# Patient Record
Sex: FEMALE | Race: BLACK OR AFRICAN AMERICAN | Employment: FULL TIME | URBAN - METROPOLITAN AREA
[De-identification: names, ages, dates, MRNs, and addresses within clinical notes are randomized per-mention and may not be internally consistent; named-entity substitution may affect disease eponyms.]

---

## 2017-01-25 DIAGNOSIS — B37.9 YEAST INFECTION: Primary | ICD-10-CM

## 2017-01-25 RX ORDER — FLUCONAZOLE 150 MG/1
150 TABLET ORAL ONCE
Qty: 1 TABLET | Refills: 1 | Status: SHIPPED | OUTPATIENT
Start: 2017-01-25 | End: 2017-01-25

## 2017-02-24 DIAGNOSIS — B37.31 VAGINAL YEAST INFECTION: Primary | ICD-10-CM

## 2017-02-24 RX ORDER — FLUCONAZOLE 150 MG/1
150 TABLET ORAL ONCE
Qty: 1 TABLET | Refills: 1 | Status: SHIPPED | OUTPATIENT
Start: 2017-02-24 | End: 2017-02-24

## 2017-03-08 ENCOUNTER — OFFICE VISIT (OUTPATIENT)
Dept: GYNECOLOGY | Age: 27
End: 2017-03-08

## 2017-03-08 VITALS
OXYGEN SATURATION: 97 % | DIASTOLIC BLOOD PRESSURE: 72 MMHG | HEART RATE: 87 BPM | SYSTOLIC BLOOD PRESSURE: 115 MMHG | BODY MASS INDEX: 51.69 KG/M2 | WEIGHT: 293 LBS

## 2017-03-08 DIAGNOSIS — Z11.3 SCREENING FOR STD (SEXUALLY TRANSMITTED DISEASE): ICD-10-CM

## 2017-03-08 DIAGNOSIS — N89.8 VAGINAL ITCHING: Primary | ICD-10-CM

## 2017-03-08 LAB
BACTERIA WET PREP: ABNORMAL
CLUE CELLS: ABNORMAL
EPITHELIAL CELLS WET PREP: ABNORMAL
HEPATITIS B SURFACE ANTIGEN INTERPRETATION: NORMAL
HEPATITIS C ANTIBODY INTERPRETATION: NORMAL
RBC WET PREP: ABNORMAL
SOURCE WET PREP: ABNORMAL
TRICHOMONAS PREP: ABNORMAL
WBC WET PREP: ABNORMAL
YEAST WET PREP: ABNORMAL

## 2017-03-08 PROCEDURE — 99213 OFFICE O/P EST LOW 20 MIN: CPT | Performed by: NURSE PRACTITIONER

## 2017-03-09 DIAGNOSIS — A59.9 TRICHOMONAS INFECTION: Primary | ICD-10-CM

## 2017-03-09 LAB
CHLAMYDIA TRACHOMATIS AMPLIFIED DET: NORMAL
HIV-1 AND HIV-2 ANTIBODIES: NORMAL
N GONORRHOEAE AMPLIFIED DET: NORMAL

## 2017-03-09 RX ORDER — TINIDAZOLE 500 MG/1
1000 TABLET ORAL
Qty: 14 TABLET | Refills: 0 | Status: SHIPPED | OUTPATIENT
Start: 2017-03-09 | End: 2017-03-16

## 2017-03-09 RX ORDER — AMPICILLIN 500 MG/1
500 CAPSULE ORAL 4 TIMES DAILY
Qty: 40 CAPSULE | Refills: 0 | Status: SHIPPED | OUTPATIENT
Start: 2017-03-09 | End: 2017-03-19

## 2017-03-10 LAB
GENITAL CULTURE, ROUTINE: ABNORMAL
GENITAL CULTURE, ROUTINE: ABNORMAL
ORGANISM: ABNORMAL
RPR: NORMAL

## 2017-06-15 ENCOUNTER — OFFICE VISIT (OUTPATIENT)
Dept: GYNECOLOGY | Age: 27
End: 2017-06-15

## 2017-06-15 VITALS
WEIGHT: 293 LBS | SYSTOLIC BLOOD PRESSURE: 132 MMHG | HEIGHT: 69 IN | OXYGEN SATURATION: 99 % | HEART RATE: 92 BPM | DIASTOLIC BLOOD PRESSURE: 90 MMHG | BODY MASS INDEX: 43.4 KG/M2 | RESPIRATION RATE: 17 BRPM | TEMPERATURE: 97.2 F

## 2017-06-15 DIAGNOSIS — Z01.419 WELL WOMAN EXAM WITH ROUTINE GYNECOLOGICAL EXAM: Primary | ICD-10-CM

## 2017-06-15 DIAGNOSIS — Z30.49 ENCOUNTER FOR SURVEILLANCE OF OTHER CONTRACEPTIVE: ICD-10-CM

## 2017-06-15 PROCEDURE — 99395 PREV VISIT EST AGE 18-39: CPT | Performed by: OBSTETRICS & GYNECOLOGY

## 2017-06-15 RX ORDER — ETONOGESTREL AND ETHINYL ESTRADIOL 11.7; 2.7 MG/1; MG/1
1 INSERT, EXTENDED RELEASE VAGINAL
Qty: 3 EACH | Refills: 3 | Status: SHIPPED | OUTPATIENT
Start: 2017-06-15 | End: 2017-06-28 | Stop reason: SDUPTHER

## 2017-06-17 ASSESSMENT — ENCOUNTER SYMPTOMS
EYES NEGATIVE: 1
RESPIRATORY NEGATIVE: 1
GASTROINTESTINAL NEGATIVE: 1

## 2017-09-19 ENCOUNTER — TELEPHONE (OUTPATIENT)
Dept: GYNECOLOGY | Age: 27
End: 2017-09-19

## 2017-09-19 DIAGNOSIS — N32.81 URGENCY-FREQUENCY SYNDROME: Primary | ICD-10-CM

## 2017-09-19 DIAGNOSIS — N89.8 VAGINAL ITCHING: ICD-10-CM

## 2017-09-19 RX ORDER — FLUCONAZOLE 150 MG/1
150 TABLET ORAL ONCE
Qty: 1 TABLET | Refills: 1 | Status: CANCELLED | OUTPATIENT
Start: 2017-09-19 | End: 2017-09-19

## 2017-09-19 RX ORDER — NITROFURANTOIN 25; 75 MG/1; MG/1
100 CAPSULE ORAL 2 TIMES DAILY
Qty: 14 CAPSULE | Refills: 0 | Status: CANCELLED | OUTPATIENT
Start: 2017-09-19 | End: 2017-09-26

## 2017-09-21 LAB
ORGANISM: ABNORMAL
URINE CULTURE, ROUTINE: ABNORMAL
URINE CULTURE, ROUTINE: ABNORMAL

## 2017-09-22 RX ORDER — CEPHALEXIN 500 MG/1
500 CAPSULE ORAL 2 TIMES DAILY
Qty: 14 CAPSULE | Refills: 0 | OUTPATIENT
Start: 2017-09-22 | End: 2017-09-29

## 2017-10-06 ENCOUNTER — TELEPHONE (OUTPATIENT)
Dept: GYNECOLOGY | Age: 27
End: 2017-10-06

## 2017-10-06 RX ORDER — FLUCONAZOLE 150 MG/1
TABLET ORAL
Qty: 1 TABLET | Refills: 1 | Status: SHIPPED | OUTPATIENT
Start: 2017-10-06 | End: 2017-10-11 | Stop reason: SDUPTHER

## 2017-10-06 NOTE — TELEPHONE ENCOUNTER
Pt calling with c/o yeast infection. Vaginal discharge is thick white. Itch present. Denies urinary frequency. Denies urinary urgency. Denies pain with urination. No discolor to urine. No odor. No pelvic pain. No fevers. Last PAP 6/15/17

## 2017-10-11 ENCOUNTER — OFFICE VISIT (OUTPATIENT)
Dept: GYNECOLOGY | Age: 27
End: 2017-10-11

## 2017-10-11 VITALS
HEART RATE: 112 BPM | DIASTOLIC BLOOD PRESSURE: 72 MMHG | WEIGHT: 293 LBS | OXYGEN SATURATION: 98 % | BODY MASS INDEX: 50.8 KG/M2 | SYSTOLIC BLOOD PRESSURE: 128 MMHG

## 2017-10-11 DIAGNOSIS — N89.8 VAGINAL DISCHARGE: Primary | ICD-10-CM

## 2017-10-11 DIAGNOSIS — R35.0 URINARY FREQUENCY: ICD-10-CM

## 2017-10-11 DIAGNOSIS — A59.01 TRICHOMONAS VAGINITIS: ICD-10-CM

## 2017-10-11 LAB
BACTERIA WET PREP: ABNORMAL
BILIRUBIN, POC: NORMAL
BLOOD URINE, POC: NORMAL
CLARITY, POC: NORMAL
CLUE CELLS: ABNORMAL
COLOR, POC: YELLOW
EPITHELIAL CELLS WET PREP: ABNORMAL
GLUCOSE URINE, POC: NORMAL
KETONES, POC: NORMAL
LEUKOCYTE EST, POC: NORMAL
NITRITE, POC: NORMAL
PH, POC: 7
PROTEIN, POC: NORMAL
RBC WET PREP: ABNORMAL
SOURCE WET PREP: ABNORMAL
SPECIFIC GRAVITY, POC: 1015
TRICHOMONAS PREP: ABNORMAL
TRICHOMONAS: POSITIVE
UROBILINOGEN, POC: 0.2
WBC WET PREP: ABNORMAL
YEAST WET PREP: ABNORMAL

## 2017-10-11 PROCEDURE — 81002 URINALYSIS NONAUTO W/O SCOPE: CPT | Performed by: NURSE PRACTITIONER

## 2017-10-11 PROCEDURE — 99213 OFFICE O/P EST LOW 20 MIN: CPT | Performed by: NURSE PRACTITIONER

## 2017-10-11 PROCEDURE — 87899 AGENT NOS ASSAY W/OPTIC: CPT | Performed by: NURSE PRACTITIONER

## 2017-10-11 RX ORDER — METRONIDAZOLE 500 MG/1
500 TABLET ORAL 2 TIMES DAILY
Qty: 14 TABLET | Refills: 0 | Status: SHIPPED | OUTPATIENT
Start: 2017-10-11 | End: 2017-10-18

## 2017-10-11 RX ORDER — FLUCONAZOLE 150 MG/1
150 TABLET ORAL
Qty: 2 TABLET | Refills: 0 | Status: SHIPPED | OUTPATIENT
Start: 2017-10-11 | End: 2017-10-15

## 2017-10-11 NOTE — PATIENT INSTRUCTIONS
Patient Education        Trichomoniasis: Care Instructions  Your Care Instructions  Trichomoniasis is a sexually transmitted infection (STI) that is spread by having sex with an infected partner. Trichomoniasis is commonly called trich (say \"trick\"). In women, trich may cause vaginal itching and a smelly discharge. But in many cases, especially in men, there are no symptoms. [place] is treated so that you do not spread it to others. Both you and your sex partner or partners should be treated at the same time so you do not infect each other again. Trich may cause problems with pregnancy. Your doctor will talk with you about treatment for Trich if you are pregnant. Follow-up care is a key part of your treatment and safety. Be sure to make and go to all appointments, and call your doctor if you are having problems. Its also a good idea to know your test results and keep a list of the medicines you take. How can you care for yourself at home? · Take your antibiotics as directed. Do not stop taking them just because you feel better. You need to take the full course of antibiotics. · Do not have sex while you are being treated. If your doctor gave you a single dose of antibiotics, do not have sex for one week after being treated and until your partner also has been treated. · Tell your sex partner (or partners) that he or she will also need to be tested and treated. · Use a cold water compress or cool baths to relieve itching. To prevent trichomoniasis in the future  · Use latex condoms every time you have sex. Use them from the beginning to the end of sexual contact. · Talk to your partner before having sex. Find out if he or she has or is at risk for trich or any other STI. Keep in mind that a person may be able to spread an STI even if he or she does not have symptoms. · Do not have sex if you are being treated for trich or any other STI.   · Do not have sex with anyone who has symptoms of an STI, such as sores on the genitals or mouth. · Having one sex partner (who does not have STIs and does not have sex with anyone else) is a good way to avoid STIs. When should you call for help? Call your doctor now or seek immediate medical care if:  · You have unusual vaginal bleeding. · You have a fever. · You have new discharge from the vagina or penis. · You have pelvic pain. Watch closely for changes in your health, and be sure to contact your doctor if:  · You do not get better as expected. · You have any new symptoms or your symptoms get worse. Where can you learn more? Go to [email or web address].[email or web address] and sign in to your [employer] account. Enter E732 in the eXenSa box to learn more about \"Trichomoniasis: Care Instructions. \"     If you do not have an account, please click on the \"Sign Up Now\" link. Current as of: March 20, 2017  Content Version: 11.3  © 2181-6935 [employer]. Care instructions adapted under license by [employer] . If you have questions about a medical condition or this instruction, always ask your healthcare professional. [place] [address] any warranty or liability for your use of this information. Patient Education        Bacterial Vaginosis: Care Instructions  Your Care Instructions    Bacterial vaginosis is a type of vaginal infection. It is caused by excess growth of certain bacteria that are normally found in the vagina. Symptoms can include itching, swelling, pain when you urinate or have sex, and a gray or yellow discharge with a \"fishy\" odor. It is not considered an infection that is spread through sexual contact. Although symptoms can be annoying and uncomfortable, bacterial vaginosis does not usually cause other health problems. However, if you have it while you are pregnant, it can cause complications. While the infection may go away on its own, most doctors use antibiotics to treat it.  You may have been prescribed ask your healthcare professional. [place] [address] any warranty or liability for your use of this information. Patient Education        Vaginal Yeast Infection: Care Instructions  Your Care Instructions  A vaginal yeast infection is caused by too many yeast cells in the vagina. This is common in women of all ages. Itching, vaginal discharge and irritation, and other symptoms can bother you. But yeast infections don't often cause other health problems. Some medicines can increase your risk of getting a yeast infection. These include antibiotics, birth control pills, hormones, and steroids. You may also be more likely to get a yeast infection if you are pregnant, have diabetes, douche, or wear tight clothes. With treatment, most yeast infections get better in 2 to 3 days. Follow-up care is a key part of your treatment and safety. Be sure to make and go to all appointments, and call your doctor if you are having problems. It's also a good idea to know your test results and keep a list of the medicines you take. How can you care for yourself at home? · Take your medicines exactly as prescribed. Call your doctor if you think you are having a problem with your medicine. · Ask your doctor about over-the-counter (OTC) medicines for yeast infections. They may cost less than prescription medicines. If you use an OTC treatment, read and follow all instructions on the label. · Do not use tampons while using a vaginal cream or suppository. The tampons can absorb the medicine. Use pads instead. · Wear loose cotton clothing. Do not wear nylon or other fabric that holds body heat and moisture close to the skin. · Try sleeping without underwear. · Do not scratch. Relieve itching with a cold pack or a cool bath. · Do not wash your vaginal area more than once a day. Use plain water or a mild, unscented soap. Air-dry the vaginal area. · Change out of wet swimsuits after swimming.   · Do not have sex

## 2017-10-11 NOTE — PROGRESS NOTES
Culture    3. Trichomonas vaginitis  POCT trichomonas is positive. Started on oral Flagyl- instructed to take with food and avoid alcohol intake. Instructed to ensure partner receives treatment and that they abstain from intercourse during abx and 1 week following. Patient requests Diflucan to help prevent yeast following abx.    - metroNIDAZOLE (FLAGYL) 500 MG tablet; Take 1 tablet by mouth 2 times daily for 7 days  Dispense: 14 tablet; Refill: 0  - fluconazole (DIFLUCAN) 150 MG tablet; Take 1 tablet by mouth every 72 hours for 2 doses Take upon completion of antibiotics. Dispense: 2 tablet; Refill: 0    Follow-up as needed.

## 2017-10-12 LAB
C TRACH DNA GENITAL QL NAA+PROBE: NEGATIVE
N. GONORRHOEAE DNA: NEGATIVE

## 2017-10-13 LAB
GENITAL CULTURE, ROUTINE: NORMAL
URINE CULTURE, ROUTINE: NORMAL

## 2018-05-25 DIAGNOSIS — Z30.49 ENCOUNTER FOR SURVEILLANCE OF OTHER CONTRACEPTIVE: ICD-10-CM

## 2018-05-25 DIAGNOSIS — Z01.419 WELL WOMAN EXAM WITH ROUTINE GYNECOLOGICAL EXAM: ICD-10-CM

## 2018-05-25 RX ORDER — ETONOGESTREL/ETHINYL ESTRADIOL .12-.015MG
RING, VAGINAL VAGINAL
Qty: 3 EACH | Refills: 3 | Status: SHIPPED | OUTPATIENT
Start: 2018-05-25 | End: 2019-12-31 | Stop reason: SDUPTHER

## 2018-07-19 ENCOUNTER — OFFICE VISIT (OUTPATIENT)
Dept: PRIMARY CARE CLINIC | Age: 28
End: 2018-07-19

## 2018-07-19 VITALS
HEART RATE: 98 BPM | HEIGHT: 69 IN | SYSTOLIC BLOOD PRESSURE: 138 MMHG | BODY MASS INDEX: 43.4 KG/M2 | DIASTOLIC BLOOD PRESSURE: 90 MMHG | TEMPERATURE: 98.1 F | OXYGEN SATURATION: 94 % | WEIGHT: 293 LBS

## 2018-07-19 DIAGNOSIS — F41.1 GENERALIZED ANXIETY DISORDER: ICD-10-CM

## 2018-07-19 DIAGNOSIS — G47.00 INSOMNIA, UNSPECIFIED TYPE: ICD-10-CM

## 2018-07-19 DIAGNOSIS — F33.1 MODERATE EPISODE OF RECURRENT MAJOR DEPRESSIVE DISORDER (HCC): ICD-10-CM

## 2018-07-19 DIAGNOSIS — R53.83 OTHER FATIGUE: ICD-10-CM

## 2018-07-19 DIAGNOSIS — Z00.00 PREVENTATIVE HEALTH CARE: Primary | ICD-10-CM

## 2018-07-19 DIAGNOSIS — Z23 NEED FOR 23-POLYVALENT PNEUMOCOCCAL POLYSACCHARIDE VACCINE: ICD-10-CM

## 2018-07-19 DIAGNOSIS — B35.4 TINEA CORPORIS: ICD-10-CM

## 2018-07-19 DIAGNOSIS — R06.83 SNORES: ICD-10-CM

## 2018-07-19 PROCEDURE — 99385 PREV VISIT NEW AGE 18-39: CPT | Performed by: INTERNAL MEDICINE

## 2018-07-19 PROCEDURE — 90732 PPSV23 VACC 2 YRS+ SUBQ/IM: CPT | Performed by: INTERNAL MEDICINE

## 2018-07-19 PROCEDURE — 90471 IMMUNIZATION ADMIN: CPT | Performed by: INTERNAL MEDICINE

## 2018-07-19 RX ORDER — LANOLIN ALCOHOL/MO/W.PET/CERES
3 CREAM (GRAM) TOPICAL DAILY
Qty: 30 TABLET | Refills: 3 | Status: SHIPPED | OUTPATIENT
Start: 2018-07-19 | End: 2018-08-20

## 2018-07-19 RX ORDER — HYDROXYZINE HYDROCHLORIDE 25 MG/1
25 TABLET, FILM COATED ORAL EVERY 6 HOURS PRN
Qty: 120 TABLET | Refills: 1 | OUTPATIENT
Start: 2018-07-19 | End: 2018-07-29

## 2018-07-19 RX ORDER — CLOTRIMAZOLE 1 %
CREAM (GRAM) TOPICAL
Qty: 60 G | Refills: 2 | Status: SHIPPED | OUTPATIENT
Start: 2018-07-19 | End: 2018-07-26

## 2018-07-19 RX ORDER — ALBUTEROL SULFATE 90 UG/1
2 AEROSOL, METERED RESPIRATORY (INHALATION) EVERY 6 HOURS PRN
Qty: 1 INHALER | Refills: 3 | Status: SHIPPED | OUTPATIENT
Start: 2018-07-19 | End: 2021-09-08

## 2018-07-19 ASSESSMENT — PATIENT HEALTH QUESTIONNAIRE - PHQ9
SUM OF ALL RESPONSES TO PHQ9 QUESTIONS 1 & 2: 0
SUM OF ALL RESPONSES TO PHQ QUESTIONS 1-9: 0
1. LITTLE INTEREST OR PLEASURE IN DOING THINGS: 0
2. FEELING DOWN, DEPRESSED OR HOPELESS: 0

## 2018-07-19 ASSESSMENT — ENCOUNTER SYMPTOMS
DIARRHEA: 0
NAUSEA: 0
ABDOMINAL PAIN: 0
VOMITING: 0
SHORTNESS OF BREATH: 0

## 2018-07-23 DIAGNOSIS — Z00.00 PREVENTATIVE HEALTH CARE: ICD-10-CM

## 2018-07-23 DIAGNOSIS — R53.83 OTHER FATIGUE: ICD-10-CM

## 2018-07-23 LAB
ALBUMIN SERPL-MCNC: 3.8 G/DL (ref 3.4–5)
ALP BLD-CCNC: 62 U/L (ref 40–129)
ALT SERPL-CCNC: 12 U/L (ref 10–40)
ANION GAP SERPL CALCULATED.3IONS-SCNC: 16 MMOL/L (ref 3–16)
AST SERPL-CCNC: 14 U/L (ref 15–37)
BASOPHILS ABSOLUTE: 0 K/UL (ref 0–0.2)
BASOPHILS RELATIVE PERCENT: 0.5 %
BILIRUB SERPL-MCNC: 0.3 MG/DL (ref 0–1)
BILIRUBIN DIRECT: <0.2 MG/DL (ref 0–0.3)
BILIRUBIN, INDIRECT: ABNORMAL MG/DL (ref 0–1)
BUN BLDV-MCNC: 6 MG/DL (ref 7–20)
CALCIUM SERPL-MCNC: 9.3 MG/DL (ref 8.3–10.6)
CHLORIDE BLD-SCNC: 105 MMOL/L (ref 99–110)
CHOLESTEROL, TOTAL: 127 MG/DL (ref 0–199)
CO2: 22 MMOL/L (ref 21–32)
CREAT SERPL-MCNC: 0.6 MG/DL (ref 0.6–1.1)
EOSINOPHILS ABSOLUTE: 0.1 K/UL (ref 0–0.6)
EOSINOPHILS RELATIVE PERCENT: 1.6 %
FOLATE: 6.92 NG/ML (ref 4.78–24.2)
GFR AFRICAN AMERICAN: >60
GFR NON-AFRICAN AMERICAN: >60
GLUCOSE BLD-MCNC: 81 MG/DL (ref 70–99)
HCT VFR BLD CALC: 43.8 % (ref 36–48)
HDLC SERPL-MCNC: 73 MG/DL (ref 40–60)
HEMOGLOBIN: 14.5 G/DL (ref 12–16)
LDL CHOLESTEROL CALCULATED: 29 MG/DL
LYMPHOCYTES ABSOLUTE: 3.8 K/UL (ref 1–5.1)
LYMPHOCYTES RELATIVE PERCENT: 44.2 %
MCH RBC QN AUTO: 28.9 PG (ref 26–34)
MCHC RBC AUTO-ENTMCNC: 33 G/DL (ref 31–36)
MCV RBC AUTO: 87.4 FL (ref 80–100)
MONOCYTES ABSOLUTE: 0.4 K/UL (ref 0–1.3)
MONOCYTES RELATIVE PERCENT: 4.8 %
NEUTROPHILS ABSOLUTE: 4.2 K/UL (ref 1.7–7.7)
NEUTROPHILS RELATIVE PERCENT: 48.9 %
PDW BLD-RTO: 14.8 % (ref 12.4–15.4)
PHOSPHORUS: 3.7 MG/DL (ref 2.5–4.9)
PLATELET # BLD: 318 K/UL (ref 135–450)
PMV BLD AUTO: 8.5 FL (ref 5–10.5)
POTASSIUM SERPL-SCNC: 4.5 MMOL/L (ref 3.5–5.1)
RBC # BLD: 5.01 M/UL (ref 4–5.2)
SODIUM BLD-SCNC: 143 MMOL/L (ref 136–145)
TOTAL PROTEIN: 7.1 G/DL (ref 6.4–8.2)
TRIGL SERPL-MCNC: 127 MG/DL (ref 0–150)
TSH REFLEX: 1.22 UIU/ML (ref 0.27–4.2)
VITAMIN B-12: 261 PG/ML (ref 211–911)
VITAMIN D 25-HYDROXY: 24.5 NG/ML
VLDLC SERPL CALC-MCNC: 25 MG/DL
WBC # BLD: 8.7 K/UL (ref 4–11)

## 2018-07-24 LAB
ESTIMATED AVERAGE GLUCOSE: 114 MG/DL
HBA1C MFR BLD: 5.6 %
HIV AG/AB: NORMAL
HIV ANTIGEN: NORMAL
HIV-1 ANTIBODY: NORMAL
HIV-2 AB: NORMAL

## 2018-08-20 ENCOUNTER — OFFICE VISIT (OUTPATIENT)
Dept: PRIMARY CARE CLINIC | Age: 28
End: 2018-08-20

## 2018-08-20 VITALS
BODY MASS INDEX: 43.4 KG/M2 | OXYGEN SATURATION: 97 % | HEART RATE: 80 BPM | WEIGHT: 293 LBS | HEIGHT: 69 IN | DIASTOLIC BLOOD PRESSURE: 84 MMHG | SYSTOLIC BLOOD PRESSURE: 124 MMHG | TEMPERATURE: 97.8 F

## 2018-08-20 DIAGNOSIS — B35.4 TINEA CORPORIS: ICD-10-CM

## 2018-08-20 DIAGNOSIS — E66.01 MORBID OBESITY DUE TO EXCESS CALORIES (HCC): ICD-10-CM

## 2018-08-20 DIAGNOSIS — F32.1 CURRENT MODERATE EPISODE OF MAJOR DEPRESSIVE DISORDER WITHOUT PRIOR EPISODE (HCC): Primary | ICD-10-CM

## 2018-08-20 PROCEDURE — 1036F TOBACCO NON-USER: CPT | Performed by: INTERNAL MEDICINE

## 2018-08-20 PROCEDURE — 99213 OFFICE O/P EST LOW 20 MIN: CPT | Performed by: INTERNAL MEDICINE

## 2018-08-20 PROCEDURE — G8427 DOCREV CUR MEDS BY ELIG CLIN: HCPCS | Performed by: INTERNAL MEDICINE

## 2018-08-20 PROCEDURE — G8417 CALC BMI ABV UP PARAM F/U: HCPCS | Performed by: INTERNAL MEDICINE

## 2018-08-20 RX ORDER — CLOTRIMAZOLE AND BETAMETHASONE DIPROPIONATE 10; .64 MG/G; MG/G
CREAM TOPICAL
Qty: 45 G | Refills: 1 | Status: SHIPPED | OUTPATIENT
Start: 2018-08-20 | End: 2020-07-30

## 2018-08-20 ASSESSMENT — ENCOUNTER SYMPTOMS
BACK PAIN: 0
SHORTNESS OF BREATH: 0
NAUSEA: 0
ABDOMINAL PAIN: 0
VOMITING: 0
COUGH: 0
DIARRHEA: 0

## 2018-08-20 NOTE — PROGRESS NOTES
Chief Complaint   Patient presents with    Anxiety     did not start Zoloft, \"I'm nervous about it\"          HPI:  Marlys Larios is a 25 y.o. female,  with a history of asthma, depression,  and anxiety, who presents for an acute visit. Depression and Anxiety:  Ms. Galilea Roblero is still very much struggling with anxiety and depression. Her symptoms started two years ago after her grandmother . Ms. Galilea Roblero was given zoloft to try at her last visit, but she says she felt nervous about starting it. She is still seeing a therapist.  Her job is a great source of anxiety as she worries about making a mistake (works as a nurse at [employer]). Morbid Obesity:  Pt is also struggling with losing weight. She has a gym membership but rarely goes. She admits to having a poor diet. She often eats out and drinks pop and sugary drinks. Current Outpatient Prescriptions   Medication Sig Dispense Refill    sertraline (ZOLOFT) 50 MG tablet Take 1 tablet by mouth daily 30 tablet 3    albuterol sulfate HFA (PROVENTIL HFA) 108 (90 Base) MCG/ACT inhaler Inhale 2 puffs into the lungs every 6 hours as needed for Wheezing or Shortness of Breath 1 Inhaler 3    NUVARING 0.12-0.015 MG/24HR vaginal ring INSERT 1 RING VAGINALLY AND LEAVE IN PLACE FOR 3 WEEKS THEN REMOVE FOR ONE WEEK 3 each 3     No current facility-administered medications for this visit. No Known Allergies    Review of Systems   Constitutional: Negative for chills, fatigue and fever. Eyes: Negative for visual disturbance. Respiratory: Negative for cough and shortness of breath. Cardiovascular: Negative for chest pain and leg swelling. Gastrointestinal: Negative for abdominal pain, diarrhea, nausea and vomiting. Musculoskeletal: Negative for arthralgias, back pain and gait problem. Skin: Positive for rash (right shin rash, hyperpigmented, itches). Neurological: Negative for light-headedness. Psychiatric/Behavioral: Positive for dysphoric mood. Negative for hallucinations and sleep disturbance. The patient is nervous/anxious. Vitals:    08/20/18 0953   BP: 124/84   Pulse: 80   Temp: 97.8 °F (36.6 °C)   TempSrc: Oral   SpO2: 97%   Weight: (!) 351 lb (159.2 kg)   Height: 5' 9\" (1.753 m)       Physical Exam   Constitutional: She is oriented to person, place, and time. She appears well-developed and well-nourished. No distress. HENT:   Head: Normocephalic and atraumatic. Nose: Nose normal.   Mouth/Throat: No oropharyngeal exudate. Eyes: Pupils are equal, round, and reactive to light. Conjunctivae and EOM are normal. Right eye exhibits no discharge. Left eye exhibits no discharge. Neck: Normal range of motion. Neck supple. Cardiovascular: Normal rate, regular rhythm and normal heart sounds. Exam reveals no gallop and no friction rub. No murmur heard. Pulmonary/Chest: Effort normal and breath sounds normal. No respiratory distress. She has no wheezes. Abdominal: Soft. Bowel sounds are normal. She exhibits no distension. There is no tenderness. There is no rebound. Musculoskeletal: Normal range of motion. She exhibits no edema or tenderness. Neurological: She is alert and oriented to person, place, and time. No cranial nerve deficit. Skin: Skin is warm and dry. No rash (right shin rash circular lesion, hyperpigmented,) noted. She is not diaphoretic. No erythema. Psychiatric:   Tearful throughout the visit    Vitals reviewed. Assessment:  Nidhi Smyth is a 25 y.o. female, with a history of asthma, depression,  and anxiety, who presents for an acute visit. Plan:       1. Current moderate episode of major depressive disorder without prior episode ([address]  -provided reassurance about starting zoloft, pt agrees to try the medication   -continue therapy     2. Morbid obesity due to excess calories ([address]    -discussed making lifestyle changes   - [facility]    3.  Tinea corporis    - clotrimazole-betamethasone (LOTRISONE) 1-0.05 % cream; Apply topically 2 times daily. Dispense: 45 g; Refill: 1        Return in about 1 month (around 9/20/2018) for depression .

## 2018-09-12 ENCOUNTER — OFFICE VISIT (OUTPATIENT)
Dept: PRIMARY CARE CLINIC | Age: 28
End: 2018-09-12

## 2018-09-12 VITALS
HEIGHT: 69 IN | DIASTOLIC BLOOD PRESSURE: 82 MMHG | WEIGHT: 293 LBS | TEMPERATURE: 98.2 F | BODY MASS INDEX: 43.4 KG/M2 | HEART RATE: 95 BPM | OXYGEN SATURATION: 95 % | SYSTOLIC BLOOD PRESSURE: 124 MMHG

## 2018-09-12 DIAGNOSIS — R06.7 SNEEZING: ICD-10-CM

## 2018-09-12 DIAGNOSIS — J40 BRONCHITIS: Primary | ICD-10-CM

## 2018-09-12 PROCEDURE — G8427 DOCREV CUR MEDS BY ELIG CLIN: HCPCS | Performed by: INTERNAL MEDICINE

## 2018-09-12 PROCEDURE — 1036F TOBACCO NON-USER: CPT | Performed by: INTERNAL MEDICINE

## 2018-09-12 PROCEDURE — 99213 OFFICE O/P EST LOW 20 MIN: CPT | Performed by: INTERNAL MEDICINE

## 2018-09-12 PROCEDURE — G8417 CALC BMI ABV UP PARAM F/U: HCPCS | Performed by: INTERNAL MEDICINE

## 2018-09-12 RX ORDER — BENZONATATE 100 MG/1
100 CAPSULE ORAL 3 TIMES DAILY PRN
Qty: 30 CAPSULE | Refills: 0 | Status: SHIPPED | OUTPATIENT
Start: 2018-09-12 | End: 2018-09-22

## 2018-09-12 RX ORDER — AZITHROMYCIN 250 MG/1
TABLET, FILM COATED ORAL
Qty: 1 PACKET | Refills: 0 | Status: SHIPPED | OUTPATIENT
Start: 2018-09-12 | End: 2018-09-16

## 2018-09-12 RX ORDER — FLUTICASONE PROPIONATE 50 MCG
1 SPRAY, SUSPENSION (ML) NASAL DAILY
Qty: 1 BOTTLE | Refills: 3 | Status: SHIPPED | OUTPATIENT
Start: 2018-09-12 | End: 2020-07-30

## 2018-09-12 ASSESSMENT — ENCOUNTER SYMPTOMS
RHINORRHEA: 0
SINUS PAIN: 0
SHORTNESS OF BREATH: 0
CONSTIPATION: 0
COUGH: 1
NAUSEA: 0
ABDOMINAL PAIN: 0
VOMITING: 0
SINUS PRESSURE: 0
DIARRHEA: 0

## 2018-09-12 NOTE — LETTER
[facility]  [address]  [address]  Phone: [phone]  Fax: [phone]    Meryle Norton, MD        September 12, 2018     Patient: Luis Tidwell   YOB: 1990   Date of Visit: 9/12/2018       To Whom it May Concern:    Luis Tidwell was seen in my clinic on 9/12/2018 for treatment of a recent illness. This same illness forced her to miss work on 9/10/18. Please excuse her on that date. She may return to work on 9/13/18 without any restriction. If you have any questions or concerns, please don't hesitate to call.     Sincerely,         Meryle Norton, MD

## 2018-09-12 NOTE — PROGRESS NOTES
Chief Complaint   Patient presents with    Congestion     recent congestion of ears, sinuses & dry cough, clearing now    Asthma     increased asthma wheezing,     Other     needs RTW letter          HPI:  Bentley Go is a 25 y.o. female, with a history of asthma, who presents for follow up visit. Coughing and congestion:  Ms. Chelsey Nuñez complains of a 2 week illness. She says it started at the end of August.  Ms. Chelsey Nuñez had a productive cough yielding green mucus. She also had frequent sneezing and chest/nasal congestion. She is also now hoarse. She was having an asthma flare during the course of the illness but those symptoms have since resolved. Pt was force to miss work on 9/10 due to her illness. Current Outpatient Prescriptions   Medication Sig Dispense Refill    clotrimazole-betamethasone (LOTRISONE) 1-0.05 % cream Apply topically 2 times daily. 45 g 1    sertraline (ZOLOFT) 50 MG tablet Take 1 tablet by mouth daily 30 tablet 3    albuterol sulfate HFA (PROVENTIL HFA) 108 (90 Base) MCG/ACT inhaler Inhale 2 puffs into the lungs every 6 hours as needed for Wheezing or Shortness of Breath 1 Inhaler 3    NUVARING 0.12-0.015 MG/24HR vaginal ring INSERT 1 RING VAGINALLY AND LEAVE IN PLACE FOR 3 WEEKS THEN REMOVE FOR ONE WEEK 3 each 3     No current facility-administered medications for this visit. No Known Allergies    Review of Systems   Constitutional: Negative for chills and fatigue. HENT: Positive for congestion and sneezing. Negative for rhinorrhea, sinus pain and sinus pressure. Eyes: Negative for visual disturbance. Respiratory: Positive for cough. Negative for shortness of breath. Cardiovascular: Negative for chest pain and leg swelling. Gastrointestinal: Negative for abdominal pain, constipation, diarrhea, nausea and vomiting. Skin: Negative for rash. Neurological: Negative for light-headedness. Psychiatric/Behavioral: Negative for dysphoric mood.  The patient is not nervous/anxious. Vitals:    09/12/18 1701   BP: 124/82   Pulse: 95   Temp: 98.2 °F (36.8 °C)   TempSrc: Oral   SpO2: 95%   Weight: (!) 353 lb 6.4 oz (160.3 kg)   Height: 5' 9\" (1.753 m)       Physical Exam   Constitutional: She is oriented to person, place, and time. She appears well-developed and well-nourished. No distress. HENT:   Head: Normocephalic and atraumatic. Nose: Nose normal.   Mouth/Throat: No oropharyngeal exudate. Eyes: Pupils are equal, round, and reactive to light. Conjunctivae and EOM are normal. Right eye exhibits no discharge. Left eye exhibits no discharge. Neck: Normal range of motion. Neck supple. Cardiovascular: Normal rate, regular rhythm and normal heart sounds. Exam reveals no gallop and no friction rub. No murmur heard. Pulmonary/Chest: Effort normal and breath sounds normal. No respiratory distress. She has no wheezes. Abdominal: Soft. Bowel sounds are normal. She exhibits no distension. There is no tenderness. There is no rebound. Musculoskeletal: Normal range of motion. She exhibits no edema or tenderness. Neurological: She is alert and oriented to person, place, and time. She has normal reflexes. No cranial nerve deficit. Skin: Skin is warm and dry. No rash noted. She is not diaphoretic. No erythema. Psychiatric: She has a normal mood and affect. Her behavior is normal.   Vitals reviewed. Assessment:  Yamile Hess is a 25 y.o. female, with a history of asthma, who presents for follow up visit. Plan:       1. Bronchitis    - azithromycin (ZITHROMAX Z-ANDRESSA) 250 MG tablet; Take 2 tablets (500 mg) on Day 1, and then take 1 tablet (250 mg) on days 2 through 5. Dispense: 1 packet; Refill: 0  -also ordered tessalon pearls     2. Sneezing    - fluticasone (FLONASE) 50 MCG/ACT nasal spray; 1 spray by Nasal route daily  Dispense: 1 Bottle; Refill: 3        Return if symptoms worsen or fail to improve.

## 2019-11-27 ENCOUNTER — OFFICE VISIT (OUTPATIENT)
Dept: PRIMARY CARE CLINIC | Age: 29
End: 2019-11-27
Payer: COMMERCIAL

## 2019-11-27 VITALS
OXYGEN SATURATION: 100 % | HEART RATE: 104 BPM | DIASTOLIC BLOOD PRESSURE: 80 MMHG | SYSTOLIC BLOOD PRESSURE: 130 MMHG | WEIGHT: 293 LBS | BODY MASS INDEX: 43.4 KG/M2 | HEIGHT: 69 IN

## 2019-11-27 DIAGNOSIS — F41.1 GENERALIZED ANXIETY DISORDER: ICD-10-CM

## 2019-11-27 DIAGNOSIS — R07.9 CHEST PAIN, UNSPECIFIED TYPE: ICD-10-CM

## 2019-11-27 DIAGNOSIS — R07.9 CHEST PAIN, UNSPECIFIED TYPE: Primary | ICD-10-CM

## 2019-11-27 LAB
A/G RATIO: 1.2 (ref 1.1–2.2)
ALBUMIN SERPL-MCNC: 3.9 G/DL (ref 3.4–5)
ALP BLD-CCNC: 72 U/L (ref 40–129)
ALT SERPL-CCNC: 15 U/L (ref 10–40)
ANION GAP SERPL CALCULATED.3IONS-SCNC: 14 MMOL/L (ref 3–16)
AST SERPL-CCNC: 13 U/L (ref 15–37)
BILIRUB SERPL-MCNC: 0.3 MG/DL (ref 0–1)
BUN BLDV-MCNC: 6 MG/DL (ref 7–20)
CALCIUM SERPL-MCNC: 9.3 MG/DL (ref 8.3–10.6)
CHLORIDE BLD-SCNC: 103 MMOL/L (ref 99–110)
CO2: 23 MMOL/L (ref 21–32)
CREAT SERPL-MCNC: 0.6 MG/DL (ref 0.6–1.1)
D DIMER: <200 NG/ML DDU (ref 0–229)
GFR AFRICAN AMERICAN: >60
GFR NON-AFRICAN AMERICAN: >60
GLOBULIN: 3.3 G/DL
GLUCOSE BLD-MCNC: 74 MG/DL (ref 70–99)
HCT VFR BLD CALC: 46.4 % (ref 36–48)
HEMOGLOBIN: 15 G/DL (ref 12–16)
MCH RBC QN AUTO: 28.3 PG (ref 26–34)
MCHC RBC AUTO-ENTMCNC: 32.3 G/DL (ref 31–36)
MCV RBC AUTO: 87.5 FL (ref 80–100)
PDW BLD-RTO: 14.9 % (ref 12.4–15.4)
PLATELET # BLD: 354 K/UL (ref 135–450)
PMV BLD AUTO: 8.6 FL (ref 5–10.5)
POTASSIUM SERPL-SCNC: 4.6 MMOL/L (ref 3.5–5.1)
RBC # BLD: 5.3 M/UL (ref 4–5.2)
SODIUM BLD-SCNC: 140 MMOL/L (ref 136–145)
TOTAL PROTEIN: 7.2 G/DL (ref 6.4–8.2)
TSH SERPL DL<=0.05 MIU/L-ACNC: 1.36 UIU/ML (ref 0.27–4.2)
WBC # BLD: 12.7 K/UL (ref 4–11)

## 2019-11-27 PROCEDURE — 99214 OFFICE O/P EST MOD 30 MIN: CPT | Performed by: FAMILY MEDICINE

## 2019-11-27 PROCEDURE — 93000 ELECTROCARDIOGRAM COMPLETE: CPT | Performed by: FAMILY MEDICINE

## 2019-11-27 ASSESSMENT — ENCOUNTER SYMPTOMS
BACK PAIN: 0
COUGH: 0
RHINORRHEA: 0
VOMITING: 0
RECTAL PAIN: 0
BLOOD IN STOOL: 0
STRIDOR: 0
COLOR CHANGE: 0
NAUSEA: 0
CHEST TIGHTNESS: 0
APNEA: 0
ABDOMINAL DISTENTION: 0
DIARRHEA: 0
PHOTOPHOBIA: 0
SINUS PRESSURE: 0
EYE REDNESS: 0
EYE ITCHING: 0
EYE PAIN: 0
SORE THROAT: 0
TROUBLE SWALLOWING: 0
CHOKING: 0
CONSTIPATION: 0
SHORTNESS OF BREATH: 0
EYE DISCHARGE: 0
WHEEZING: 0

## 2019-11-28 ENCOUNTER — TELEPHONE (OUTPATIENT)
Dept: PRIMARY CARE CLINIC | Age: 29
End: 2019-11-28

## 2019-12-31 ENCOUNTER — TELEPHONE (OUTPATIENT)
Dept: PRIMARY CARE CLINIC | Age: 29
End: 2019-12-31

## 2019-12-31 DIAGNOSIS — Z01.419 WELL WOMAN EXAM WITH ROUTINE GYNECOLOGICAL EXAM: ICD-10-CM

## 2019-12-31 DIAGNOSIS — Z30.49 ENCOUNTER FOR SURVEILLANCE OF OTHER CONTRACEPTIVE: ICD-10-CM

## 2019-12-31 RX ORDER — ETONOGESTREL AND ETHINYL ESTRADIOL 11.7; 2.7 MG/1; MG/1
1 INSERT, EXTENDED RELEASE VAGINAL SEE ADMIN INSTRUCTIONS
Qty: 1 EACH | Refills: 1 | Status: SHIPPED | OUTPATIENT
Start: 2019-12-31 | End: 2020-01-28 | Stop reason: SDUPTHER

## 2020-01-28 ENCOUNTER — OFFICE VISIT (OUTPATIENT)
Dept: GYNECOLOGY | Age: 30
End: 2020-01-28
Payer: COMMERCIAL

## 2020-01-28 VITALS
BODY MASS INDEX: 41.95 KG/M2 | DIASTOLIC BLOOD PRESSURE: 78 MMHG | WEIGHT: 293 LBS | SYSTOLIC BLOOD PRESSURE: 132 MMHG | HEIGHT: 70 IN | HEART RATE: 64 BPM | RESPIRATION RATE: 17 BRPM

## 2020-01-28 PROCEDURE — 99395 PREV VISIT EST AGE 18-39: CPT | Performed by: OBSTETRICS & GYNECOLOGY

## 2020-01-28 RX ORDER — ETONOGESTREL AND ETHINYL ESTRADIOL 11.7; 2.7 MG/1; MG/1
1 INSERT, EXTENDED RELEASE VAGINAL SEE ADMIN INSTRUCTIONS
Qty: 3 EACH | Refills: 3 | Status: SHIPPED | OUTPATIENT
Start: 2020-01-28 | End: 2021-01-14

## 2020-01-29 LAB — URINE CULTURE, ROUTINE: NORMAL

## 2020-01-29 ASSESSMENT — ENCOUNTER SYMPTOMS
EYES NEGATIVE: 1
RESPIRATORY NEGATIVE: 1
GASTROINTESTINAL NEGATIVE: 1

## 2020-01-29 NOTE — PROGRESS NOTES
connections:     Talks on phone: Not on file     Gets together: Not on file     Attends Church service: Not on file     Active member of club or organization: Not on file     Attends meetings of clubs or organizations: Not on file     Relationship status: Not on file    Intimate partner violence:     Fear of current or ex partner: Not on file     Emotionally abused: Not on file     Physically abused: Not on file     Forced sexual activity: Not on file   Other Topics Concern    Not on file   Social History Narrative    Not on file     No Known Allergies  Outpatient Medications Marked as Taking for the 1/28/20 encounter (Office Visit) with Arnold Rubinstein, MD   Medication Sig Dispense Refill    etonogestrel-ethinyl estradiol ([address]) 0.12-0.015 MG/24HR vaginal ring Place 1 each vaginally See Admin Instructions 3 each 3    fluticasone (FLONASE) 50 MCG/ACT nasal spray 1 spray by Nasal route daily 1 Bottle 3    albuterol sulfate HFA (PROVENTIL HFA) 108 (90 Base) MCG/ACT inhaler Inhale 2 puffs into the lungs every 6 hours as needed for Wheezing or Shortness of Breath 1 Inhaler 3     Family History   Problem Relation Age of Onset    Hypertension Mother     Cancer Father     Heart Attack Paternal Grandmother     Rheum Arthritis Neg Hx     Osteoarthritis Neg Hx     Asthma Neg Hx     Breast Cancer Neg Hx     Diabetes Neg Hx     Heart Failure Neg Hx     High Cholesterol Neg Hx     Migraines Neg Hx     Ovarian Cancer Neg Hx     Rashes/Skin Problems Neg Hx     Seizures Neg Hx     Stroke Neg Hx     Thyroid Disease Neg Hx      /78 (Site: Right Lower Arm, Position: Sitting, Cuff Size: Large Adult)   Pulse 64   Resp 17   Ht 5' 10\" (1.778 m)   Wt (!) 359 lb (162.8 kg)   LMP 01/22/2020   Breastfeeding No   BMI 51.51 kg/m²       Objective:   Physical Exam  Constitutional:       Appearance: Normal appearance. She is well-developed and normal weight. HENT:      Head: Normocephalic.       Nose: Nose normal.      Mouth/Throat:      Mouth: Mucous membranes are moist.      Pharynx: Oropharynx is clear. Eyes:      Pupils: Pupils are equal, round, and reactive to light. Neck:      Musculoskeletal: Normal range of motion and neck supple. No neck rigidity. Thyroid: No thyromegaly. Cardiovascular:      Rate and Rhythm: Normal rate and regular rhythm. Pulses: Normal pulses. Heart sounds: Normal heart sounds. No murmur. No friction rub. No gallop. Pulmonary:      Effort: Pulmonary effort is normal. No respiratory distress. Breath sounds: Normal breath sounds. No stridor. No wheezing, rhonchi or rales. Chest:      Chest wall: No tenderness. Breasts:         Right: Normal. No swelling, bleeding, inverted nipple, mass, nipple discharge, skin change or tenderness. Left: Normal. No swelling, bleeding, inverted nipple, mass, nipple discharge, skin change or tenderness. Abdominal:      General: Bowel sounds are normal. There is no distension. Palpations: Abdomen is soft. There is no mass. Tenderness: There is no abdominal tenderness. There is no guarding or rebound. Hernia: No hernia is present. There is no hernia in the right inguinal area or left inguinal area. Genitourinary:     General: Normal vulva. Exam position: Lithotomy position. Pubic Area: No rash. Labia:         Right: No rash, tenderness, lesion or injury. Left: No rash, tenderness, lesion or injury. Urethra: No prolapse, urethral pain, urethral swelling or urethral lesion. Vagina: No signs of injury and foreign body. No vaginal discharge, erythema, tenderness, bleeding, lesions or prolapsed vaginal walls. Cervix: No cervical motion tenderness, discharge, friability, lesion, erythema, cervical bleeding or eversion. Uterus: Not deviated, not enlarged, not fixed and not tender. Adnexa:         Right: No mass, tenderness or fullness.           Left: No mass, tenderness or fullness. Rectum: No anal fissure or external hemorrhoid. Comments: Normal urethral meatus, normal urethra, nl bladder  Musculoskeletal: Normal range of motion. General: No tenderness. Lymphadenopathy:      Cervical: No cervical adenopathy. Lower Body: No right inguinal adenopathy. No left inguinal adenopathy. Skin:     General: Skin is warm and dry. Coloration: Skin is not pale. Findings: No erythema or rash. Neurological:      General: No focal deficit present. Mental Status: She is alert and oriented to person, place, and time. Mental status is at baseline. Deep Tendon Reflexes: Reflexes are normal and symmetric. Psychiatric:         Mood and Affect: Mood normal.         Behavior: Behavior normal.         Thought Content: Thought content normal.         Judgment: Judgment normal.         Assessment:      1. Annual  2. Dysuria  3. Birth control      Plan:      1. Pap, calcium, exercise  2. UC  3.  Refill Nuvaring for one year        Uday Kidd MD

## 2020-01-30 LAB
HPV COMMENT: NORMAL
HPV TYPE 16: NOT DETECTED
HPV TYPE 18: NOT DETECTED
HPVOH (OTHER TYPES): NOT DETECTED

## 2020-02-03 RX ORDER — NITROFURANTOIN 25; 75 MG/1; MG/1
100 CAPSULE ORAL 2 TIMES DAILY
Qty: 14 CAPSULE | Refills: 0 | Status: SHIPPED | OUTPATIENT
Start: 2020-02-03 | End: 2020-02-10

## 2020-02-26 NOTE — PATIENT INSTRUCTIONS
Bedside and Verbal shift change report given to [address] [place] [address], [address] (oncoming nurse) by Marina Christian (offgoing nurse). Report included the following information SBAR, Kardex, Intake/Output, MAR, Recent Results, Cardiac Rhythm NSR and Alarm Parameters . Patient Education        Starting a Weight Loss Plan: Care Instructions  Your Care Instructions    If you are thinking about losing weight, it can be hard to know where to start. Your doctor can help you set up a weight loss plan that best meets your needs. You may want to take a class on nutrition or exercise, or join a weight loss support group. If you have questions about how to make changes to your eating or exercise habits, ask your doctor about seeing a registered dietitian or an exercise specialist.  It can be a big challenge to lose weight. But you do not have to make huge changes at once. Make small changes, and stick with them. When those changes become habit, add a few more changes. If you do not think you are ready to make changes right now, try to pick a date in the future. Make an appointment to see your doctor to discuss whether the time is right for you to start a plan. Follow-up care is a key part of your treatment and safety. Be sure to make and go to all appointments, and call your doctor if you are having problems. It's also a good idea to know your test results and keep a list of the medicines you take. How can you care for yourself at home? · Set realistic goals. Many people expect to lose much more weight than is likely. A weight loss of 5% to 10% of your body weight may be enough to improve your health. · Get family and friends involved to provide support. Talk to them about why you are trying to lose weight, and ask them to help. They can help by participating in exercise and having meals with you, even if they may be eating something different. · Find what works best for you. If you do not have time or do not like to cook, a program that offers meal replacement bars or shakes may be better for you.  Or if you like to prepare meals, finding a plan that includes daily menus and recipes may be best.  · Ask your doctor about other health professionals who can help you achieve your weight that is calculated from your weight and your height. To figure your BMI for yourself, get a BMI table from your doctor or use an online tool, such as http://www.Abakus.Buildingeye/ on the [employer]. A healthy BMI is from 18.5 to 24.9. If your BMI is from 30.0 to 39.9, you are considered to have obesity. If your BMI is over 40.0, you are considered to have extreme obesity. What causes obesity? When you take in more calories than you burn off, you gain weight. How you eat, how active you are, and other things affect how your body uses calories and whether you gain weight. If you have family members who have too much body fat, you may have inherited a tendency to gain weight. And your family also helps form your eating and lifestyle habits, which can lead to obesity. Also, our busy lives make it harder to plan and cook healthy meals. For many of us, it's easier to reach for prepared foods, go out to eat, or go to the drive-through. But these foods are often high in saturated fat and calories. Portions are often too large. What can you do to reach a healthy weight? Focus on health, not diets. Diets are hard to stay on and don't work in the long run. It is very hard to stay with a diet that includes lots of big changes in your eating habits. Instead of a diet, focus on lifestyle changes that will improve your health and achieve the right balance of energy and calories. To lose weight, you need to burn more calories than you take in. You can do it by eating healthy foods in reasonable amounts and becoming more active, even a little bit every day. Making small changes over time can add up to a lot. Make a plan for change. Many people have found that naming their reasons for change and staying focused on their plan can make a big difference. Work with your doctor to create a plan that is right for you.   · Ask yourself: Julius Dick are my personal, most

## 2020-07-10 ENCOUNTER — TELEPHONE (OUTPATIENT)
Dept: PRIMARY CARE CLINIC | Age: 30
End: 2020-07-10

## 2020-07-10 ENCOUNTER — TELEPHONE (OUTPATIENT)
Dept: GYNECOLOGY | Age: 30
End: 2020-07-10

## 2020-07-10 DIAGNOSIS — R30.0 DYSURIA: ICD-10-CM

## 2020-07-10 RX ORDER — SULFAMETHOXAZOLE AND TRIMETHOPRIM 400; 80 MG/1; MG/1
1 TABLET ORAL 2 TIMES DAILY
Qty: 14 TABLET | Refills: 0 | Status: SHIPPED | OUTPATIENT
Start: 2020-07-10 | End: 2020-07-17

## 2020-07-10 NOTE — TELEPHONE ENCOUNTER
Patient called in to office, wanting to know if Dr. Anibal Kenney had responded to the message in regarding to thinking she has a UTI. Informed patient that Dr. Anibal Kenney was not in the office on today. And she will follow up on her message. Patient stated she wanted a number to call Dr. Anibal Kenney at herself told patient that I would send Dr. Anibal Kenney a message. Patient also,stated she is going to urgent care since things are worse.

## 2020-07-10 NOTE — TELEPHONE ENCOUNTER
When patient called I could not hear her name. I told her to call back. I did not get to computer until now and am seeing message. I will put in order for urine culture and antibiotics anyway  But that is  Fine she went to urgent care. If she is feeling that  Badly, she should.

## 2020-07-13 LAB
ORGANISM: ABNORMAL
URINE CULTURE, ROUTINE: ABNORMAL

## 2020-07-16 DIAGNOSIS — B37.9 YEAST INFECTION: Primary | ICD-10-CM

## 2020-07-16 RX ORDER — FLUCONAZOLE 150 MG/1
150 TABLET ORAL ONCE
Qty: 1 TABLET | Refills: 1 | OUTPATIENT
Start: 2020-07-16 | End: 2020-07-16

## 2020-07-30 ENCOUNTER — OFFICE VISIT (OUTPATIENT)
Dept: PRIMARY CARE CLINIC | Age: 30
End: 2020-07-30
Payer: COMMERCIAL

## 2020-07-30 VITALS
DIASTOLIC BLOOD PRESSURE: 80 MMHG | HEART RATE: 94 BPM | SYSTOLIC BLOOD PRESSURE: 110 MMHG | TEMPERATURE: 97.1 F | BODY MASS INDEX: 41.95 KG/M2 | WEIGHT: 293 LBS | HEIGHT: 70 IN

## 2020-07-30 PROCEDURE — 99215 OFFICE O/P EST HI 40 MIN: CPT | Performed by: INTERNAL MEDICINE

## 2020-07-30 RX ORDER — HYDROXYZINE PAMOATE 25 MG/1
25 CAPSULE ORAL 3 TIMES DAILY PRN
Qty: 30 CAPSULE | Refills: 1 | Status: SHIPPED | OUTPATIENT
Start: 2020-07-30 | End: 2020-08-29

## 2020-07-30 ASSESSMENT — ENCOUNTER SYMPTOMS
ABDOMINAL DISTENTION: 0
DIARRHEA: 0
GASTROINTESTINAL NEGATIVE: 1
CONSTIPATION: 0
BLOOD IN STOOL: 0
CHEST TIGHTNESS: 0
ABDOMINAL PAIN: 0
EYES NEGATIVE: 1
COUGH: 0
WHEEZING: 0
SHORTNESS OF BREATH: 0

## 2020-07-30 NOTE — PROGRESS NOTES
Subjective:      Patient ID: Tiera Mukherjee is a 26 y.o. female. 7/30/2020   Dr Sigifredo Amaya Patient presents with:  Established New Doctor    Ch anxiety . Getting worse . Was given Zoloft but never tried it     Wants to try Vistaril     Nurse by profession            Review of Systems   Constitutional: Negative for activity change, appetite change, fatigue, fever and unexpected weight change. Flu vac 10/19    Tdap     Pneum Vac 7/18   HENT: Negative. Dental care reg    Eyes: Negative. Negative for visual disturbance. Wears glasses     Last exam 2019   Respiratory: Negative for cough, chest tightness, shortness of breath and wheezing. Does not smoke     No Etoh     asthma    Cardiovascular: Negative. No HTN / CAD     FH of HTN but no CAD    Gastrointestinal: Negative. Negative for abdominal distention, abdominal pain, blood in stool, constipation and diarrhea. No FH of ca colon    Endocrine:        FH of Diabetes    Genitourinary: Positive for frequency. Negative for dysuria, menstrual problem, urgency and vaginal discharge. Periods reg     LMP   Currently on     One child 8 . Nl gestation     On nuvaring     Last pelvic 2/20       Musculoskeletal: Negative. Allergic/Immunologic: Negative for environmental allergies and food allergies. Neurological: Positive for headaches (off and on ). Negative for dizziness and weakness. Psychiatric/Behavioral: Positive for dysphoric mood and sleep disturbance. Negative for behavioral problems and suicidal ideas. The patient is nervous/anxious. Objective:   Physical Exam  Vitals signs reviewed. Constitutional:       General: She is not in acute distress. Appearance: She is obese. Eyes:      Extraocular Movements: Extraocular movements intact. Conjunctiva/sclera: Conjunctivae normal.   Neck:      Musculoskeletal: Normal range of motion and neck supple.    Cardiovascular:      Rate and Rhythm: Normal rate

## 2021-01-19 DIAGNOSIS — Z30.49 ENCOUNTER FOR SURVEILLANCE OF OTHER CONTRACEPTIVE: ICD-10-CM

## 2021-01-19 RX ORDER — ETONOGESTREL AND ETHINYL ESTRADIOL 11.7; 2.7 MG/1; MG/1
1 INSERT, EXTENDED RELEASE VAGINAL SEE ADMIN INSTRUCTIONS
Qty: 3 EACH | Refills: 1 | Status: SHIPPED | OUTPATIENT
Start: 2021-01-19 | End: 2022-02-01 | Stop reason: SDUPTHER

## 2021-02-22 ENCOUNTER — OFFICE VISIT (OUTPATIENT)
Dept: PRIMARY CARE CLINIC | Age: 31
End: 2021-02-22
Payer: COMMERCIAL

## 2021-02-22 VITALS
HEART RATE: 103 BPM | SYSTOLIC BLOOD PRESSURE: 118 MMHG | DIASTOLIC BLOOD PRESSURE: 80 MMHG | TEMPERATURE: 97.1 F | WEIGHT: 293 LBS | HEIGHT: 70 IN | BODY MASS INDEX: 41.95 KG/M2

## 2021-02-22 DIAGNOSIS — F41.9 ANXIETY AND DEPRESSION: Primary | ICD-10-CM

## 2021-02-22 DIAGNOSIS — F32.A ANXIETY AND DEPRESSION: Primary | ICD-10-CM

## 2021-02-22 DIAGNOSIS — Z00.00 PREVENTATIVE HEALTH CARE: ICD-10-CM

## 2021-02-22 DIAGNOSIS — E66.01 CLASS 3 SEVERE OBESITY DUE TO EXCESS CALORIES WITHOUT SERIOUS COMORBIDITY WITH BODY MASS INDEX (BMI) OF 50.0 TO 59.9 IN ADULT (HCC): ICD-10-CM

## 2021-02-22 LAB
A/G RATIO: 1.1 (ref 1.1–2.2)
ALBUMIN SERPL-MCNC: 3.9 G/DL (ref 3.4–5)
ALP BLD-CCNC: 84 U/L (ref 40–129)
ALT SERPL-CCNC: 19 U/L (ref 10–40)
ANION GAP SERPL CALCULATED.3IONS-SCNC: 15 MMOL/L (ref 3–16)
AST SERPL-CCNC: 15 U/L (ref 15–37)
ATYPICAL LYMPHOCYTE RELATIVE PERCENT: 2 % (ref 0–6)
BANDED NEUTROPHILS RELATIVE PERCENT: 2 % (ref 0–7)
BASOPHILS ABSOLUTE: 0.1 K/UL (ref 0–0.2)
BASOPHILS RELATIVE PERCENT: 1 %
BILIRUB SERPL-MCNC: <0.2 MG/DL (ref 0–1)
BUN BLDV-MCNC: 7 MG/DL (ref 7–20)
CALCIUM SERPL-MCNC: 10.1 MG/DL (ref 8.3–10.6)
CHLORIDE BLD-SCNC: 103 MMOL/L (ref 99–110)
CO2: 23 MMOL/L (ref 21–32)
CREAT SERPL-MCNC: 0.6 MG/DL (ref 0.6–1.1)
EOSINOPHILS ABSOLUTE: 0.4 K/UL (ref 0–0.6)
EOSINOPHILS RELATIVE PERCENT: 3 %
GFR AFRICAN AMERICAN: >60
GFR NON-AFRICAN AMERICAN: >60
GLOBULIN: 3.4 G/DL
GLUCOSE BLD-MCNC: 96 MG/DL (ref 70–99)
HCT VFR BLD CALC: 45.4 % (ref 36–48)
HEMATOLOGY PATH CONSULT: YES
HEMOGLOBIN: 14.9 G/DL (ref 12–16)
LYMPHOCYTES ABSOLUTE: 7.5 K/UL (ref 1–5.1)
LYMPHOCYTES RELATIVE PERCENT: 52 %
MCH RBC QN AUTO: 28.5 PG (ref 26–34)
MCHC RBC AUTO-ENTMCNC: 32.9 G/DL (ref 31–36)
MCV RBC AUTO: 86.5 FL (ref 80–100)
METAMYELOCYTES RELATIVE PERCENT: 1 %
MONOCYTES ABSOLUTE: 0.7 K/UL (ref 0–1.3)
MONOCYTES RELATIVE PERCENT: 5 %
NEUTROPHILS ABSOLUTE: 5.1 K/UL (ref 1.7–7.7)
NEUTROPHILS RELATIVE PERCENT: 34 %
PDW BLD-RTO: 14.8 % (ref 12.4–15.4)
PLATELET # BLD: 304 K/UL (ref 135–450)
PMV BLD AUTO: 8.7 FL (ref 5–10.5)
POTASSIUM SERPL-SCNC: 4.1 MMOL/L (ref 3.5–5.1)
RBC # BLD: 5.25 M/UL (ref 4–5.2)
RBC # BLD: NORMAL 10*6/UL
SMUDGE CELLS: PRESENT
SODIUM BLD-SCNC: 141 MMOL/L (ref 136–145)
TOTAL PROTEIN: 7.3 G/DL (ref 6.4–8.2)
VITAMIN D 25-HYDROXY: 15 NG/ML
WBC # BLD: 13.9 K/UL (ref 4–11)

## 2021-02-22 PROCEDURE — 99213 OFFICE O/P EST LOW 20 MIN: CPT | Performed by: NURSE PRACTITIONER

## 2021-02-22 ASSESSMENT — ENCOUNTER SYMPTOMS
SORE THROAT: 0
NAUSEA: 0
DIARRHEA: 0
VOMITING: 0
SHORTNESS OF BREATH: 0
COUGH: 0

## 2021-02-22 NOTE — LETTER
[facility]  [id number] [facility] [address]  Phone: [phone]  Fax: [phone]    LISA Zhang        February 22, 2021     Patient: Rosa Maria Aguila   YOB: 1990   Date of Visit: 2/22/2021       To Whom It May Concern: It is my medical opinion that Rosa Maria Aguila should be excused from work 2/22/2021. If you have any questions or concerns, please don't hesitate to call.     Sincerely,          LISA Zhang

## 2021-02-22 NOTE — PROGRESS NOTES
Date: 2/22/2021                                               Subjective/Objective:     Chief Complaint   Patient presents with    Annual Exam       HPI     Cristian Figueredo is a 25 yo female, patient of Dr Sosa Dear. Here for physical. Last OV 7/30/2020. Anxiety/depression - has zoloft on her list, never started to take when it was prescribed. Reports she also has Vistaril PRN, was needing frequently when she was out of work. Reports since October she has taken once a month at most. Feels that her mood is good, denies loss of interest/pleasure in her normal activities. Reports her anxiety is doing much better after making some life changes. Obesity - is interested in referral for weight management. Has gained 20 lb since visit 7/2020. She was off work for a few months which she feels effected this. Then was off for a few weeks with COVID. BCM - vaginal ring.           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 [address] 7/19/2018    Unspecified vitamin D deficiency 4/13/2011       Past Surgical History:   Procedure Laterality Date    DILATION AND CURETTAGE OF UTERUS  4/19/2013       Orders Only on 07/10/2020   Component Date Value Ref Range Status    Organism 07/10/2020 Escherichia coli*  Final    Urine Culture, Routine 07/10/2020 >100,000 CFU/ml   Final       Family History   Problem Relation Age of Onset    Hypertension Mother  Cancer Father     Heart Attack Paternal Grandmother     Rheum Arthritis Neg Hx     Osteoarthritis Neg Hx     Asthma Neg Hx     Breast Cancer Neg Hx     Diabetes Neg Hx     Heart Failure Neg Hx     High Cholesterol Neg Hx     Migraines Neg Hx     Ovarian Cancer Neg Hx     Rashes/Skin Problems Neg Hx     Seizures Neg Hx     Stroke Neg Hx     Thyroid Disease Neg Hx        Current Outpatient Medications   Medication Sig Dispense Refill    etonogestrel-ethinyl estradiol (ELURYNG) 0.12-0.015 MG/24HR vaginal ring Place 1 each vaginally See Admin Instructions 3 each 1    albuterol sulfate HFA (PROVENTIL HFA) 108 (90 Base) MCG/ACT inhaler Inhale 2 puffs into the lungs every 6 hours as needed for Wheezing or Shortness of Breath 1 Inhaler 3     No current facility-administered medications for this visit. No Known Allergies    Review of Systems   Constitutional: Negative for chills and fever. HENT: Negative for sore throat. Respiratory: Negative for cough and shortness of breath. Cardiovascular: Negative for chest pain and leg swelling. Gastrointestinal: Negative for diarrhea, nausea and vomiting. Endocrine: Negative for polydipsia, polyphagia and polyuria. Genitourinary: Negative for difficulty urinating. Neurological: Positive for headaches. Negative for dizziness. Intermittent headaches, chronic, resolve with with ibuprofen     Psychiatric/Behavioral: Negative for sleep disturbance. Vitals:  /80   Pulse 103   Temp 97.1 °F (36.2 °C) (Temporal)   Ht 5' 10\" (1.778 m)   Wt (!) 368 lb (166.9 kg)   BMI 52.80 kg/m²     Physical Exam  Constitutional:       General: She is not in acute distress. Appearance: Normal appearance. She is obese. HENT:      Head: Normocephalic and atraumatic. Neck:      Musculoskeletal: Normal range of motion. Cardiovascular:      Rate and Rhythm: Normal rate and regular rhythm. Pulses: Normal pulses. Heart sounds: Normal heart sounds. No murmur. Pulmonary:      Effort: Pulmonary effort is normal. No respiratory distress. Breath sounds: Normal breath sounds. Abdominal:      General: Bowel sounds are normal.      Palpations: Abdomen is soft. Tenderness: There is no abdominal tenderness. Musculoskeletal: Normal range of motion. Skin:     General: Skin is warm and dry. Neurological:      General: No focal deficit present. Mental Status: She is alert and oriented to person, place, and time. Psychiatric:         Mood and Affect: Mood normal.         Behavior: Behavior normal.         Thought Content: Thought content normal.         Judgment: Judgment normal.         Assessment/Plan     1. Anxiety and depression: Did not start SSRI. Feels she is doing better and does not feel the need to start this. Is using as needed hydroxyzine monthly or less -continue with this. Education provided. 2. Class 3 severe obesity due to excess calories without serious comorbidity with body mass index (BMI) of 50.0 to 59.9 in adult [facility])  - [facility] [address] [place] [address]    3. Preventative health care  - COMPREHENSIVE METABOLIC PANEL; Future  - CBC WITH AUTO DIFFERENTIAL;  Future  - VITAMIN D 25 HYDROXY; Future      Orders Placed This Encounter   Procedures    COMPREHENSIVE METABOLIC PANEL     Standing Status:   Future     Number of Occurrences:   1     Standing Expiration Date:   2/22/2022    CBC WITH AUTO DIFFERENTIAL     Standing Status:   Future     Number of Occurrences:   1     Standing Expiration Date:   2/22/2022    VITAMIN D 25 HYDROXY     Standing Status:   Future     Number of Occurrences:   1     Standing Expiration Date:   2/22/2022   [facility]     Referral Priority:   Routine     Referral Type:   Consult for Advice and Opinion     Referral Reason:   Specialty Services Required     Requested Specialty:   Nutrition

## 2021-02-23 DIAGNOSIS — E55.9 VITAMIN D DEFICIENCY: Primary | ICD-10-CM

## 2021-02-23 DIAGNOSIS — D72.820 LYMPHOCYTOSIS: ICD-10-CM

## 2021-02-23 LAB — HEMATOLOGY PATH CONSULT: NORMAL

## 2021-02-23 RX ORDER — ERGOCALCIFEROL 1.25 MG/1
50000 CAPSULE ORAL WEEKLY
Qty: 8 CAPSULE | Refills: 0 | Status: SHIPPED | OUTPATIENT
Start: 2021-02-23 | End: 2021-04-14

## 2021-03-01 DIAGNOSIS — D72.820 LYMPHOCYTOSIS: ICD-10-CM

## 2021-03-01 LAB
BASOPHILS ABSOLUTE: 0.1 K/UL (ref 0–0.2)
BASOPHILS RELATIVE PERCENT: 0.8 %
EOSINOPHILS ABSOLUTE: 0.2 K/UL (ref 0–0.6)
EOSINOPHILS RELATIVE PERCENT: 1.3 %
HCT VFR BLD CALC: 44.9 % (ref 36–48)
HEMOGLOBIN: 14.9 G/DL (ref 12–16)
LYMPHOCYTES ABSOLUTE: 5 K/UL (ref 1–5.1)
LYMPHOCYTES RELATIVE PERCENT: 42.1 %
MCH RBC QN AUTO: 28.5 PG (ref 26–34)
MCHC RBC AUTO-ENTMCNC: 33.2 G/DL (ref 31–36)
MCV RBC AUTO: 85.9 FL (ref 80–100)
MONOCYTES ABSOLUTE: 0.5 K/UL (ref 0–1.3)
MONOCYTES RELATIVE PERCENT: 4.1 %
NEUTROPHILS ABSOLUTE: 6.1 K/UL (ref 1.7–7.7)
NEUTROPHILS RELATIVE PERCENT: 51.7 %
PDW BLD-RTO: 14.7 % (ref 12.4–15.4)
PLATELET # BLD: 343 K/UL (ref 135–450)
PMV BLD AUTO: 8.5 FL (ref 5–10.5)
RBC # BLD: 5.22 M/UL (ref 4–5.2)
WBC # BLD: 11.8 K/UL (ref 4–11)

## 2021-03-02 DIAGNOSIS — D72.829 LEUKOCYTOSIS, UNSPECIFIED TYPE: Primary | ICD-10-CM

## 2021-03-09 ENCOUNTER — TELEPHONE (OUTPATIENT)
Dept: GYNECOLOGY | Age: 31
End: 2021-03-09

## 2021-03-09 NOTE — TELEPHONE ENCOUNTER
Patient calling in to request prescription. Says her urinate flow cuts off when urinates and pain in pelvic area. Patient believes she has UTI.  Please review and contact patient at [phone] [facility], [address] - f [phone]

## 2021-03-09 NOTE — TELEPHONE ENCOUNTER
Tell patient to do urine culture. Call in Yvon Patel 103 100 mg po bid for 7 days. Dispense 14 with no refills.

## 2021-03-10 DIAGNOSIS — N39.0 URINARY TRACT INFECTION WITHOUT HEMATURIA, SITE UNSPECIFIED: Primary | ICD-10-CM

## 2021-03-10 DIAGNOSIS — N39.0 URINARY TRACT INFECTION WITHOUT HEMATURIA, SITE UNSPECIFIED: ICD-10-CM

## 2021-03-10 RX ORDER — NITROFURANTOIN 25; 75 MG/1; MG/1
100 CAPSULE ORAL 2 TIMES DAILY
Qty: 14 CAPSULE | Refills: 0 | OUTPATIENT
Start: 2021-03-10 | End: 2021-03-17

## 2021-03-10 NOTE — TELEPHONE ENCOUNTER
Called and spoke to patient, informed patient that her script was called into her RX and also let patient know Dr Dariana Grigsby wanted her to have a Urine Culture done at lab. Order is placed in system for culture.

## 2021-03-12 LAB — URINE CULTURE, ROUTINE: NORMAL

## 2021-03-15 DIAGNOSIS — D72.829 LEUKOCYTOSIS, UNSPECIFIED TYPE: ICD-10-CM

## 2021-03-15 LAB
BASOPHILS ABSOLUTE: 0.1 K/UL (ref 0–0.2)
BASOPHILS RELATIVE PERCENT: 0.5 %
EOSINOPHILS ABSOLUTE: 0.2 K/UL (ref 0–0.6)
EOSINOPHILS RELATIVE PERCENT: 1.2 %
HCT VFR BLD CALC: 46.1 % (ref 36–48)
HEMOGLOBIN: 15 G/DL (ref 12–16)
LYMPHOCYTES ABSOLUTE: 4.3 K/UL (ref 1–5.1)
LYMPHOCYTES RELATIVE PERCENT: 34.1 %
MCH RBC QN AUTO: 28.1 PG (ref 26–34)
MCHC RBC AUTO-ENTMCNC: 32.4 G/DL (ref 31–36)
MCV RBC AUTO: 86.7 FL (ref 80–100)
MONOCYTES ABSOLUTE: 0.6 K/UL (ref 0–1.3)
MONOCYTES RELATIVE PERCENT: 4.4 %
NEUTROPHILS ABSOLUTE: 7.5 K/UL (ref 1.7–7.7)
NEUTROPHILS RELATIVE PERCENT: 59.8 %
PDW BLD-RTO: 14.9 % (ref 12.4–15.4)
PLATELET # BLD: 333 K/UL (ref 135–450)
PMV BLD AUTO: 8.8 FL (ref 5–10.5)
RBC # BLD: 5.32 M/UL (ref 4–5.2)
WBC # BLD: 12.6 K/UL (ref 4–11)

## 2021-03-19 ENCOUNTER — TELEPHONE (OUTPATIENT)
Dept: BARIATRICS/WEIGHT MGMT | Age: 31
End: 2021-03-19

## 2021-03-19 DIAGNOSIS — D72.829 LEUKOCYTOSIS, UNSPECIFIED TYPE: Primary | ICD-10-CM

## 2021-03-19 NOTE — TELEPHONE ENCOUNTER
Patient was sent Dr Mckoy Counter digital bariatric seminar     She DOES have BWLS coverage with [employer] (No specified diet) BMI Form scanned in media. *Spoke with patient. Info given. Pt verbalized understanding. Appt sched. NP pk e-mailed.   Per pt: no history of wgt loss sx, BMI > 40

## 2021-03-25 DIAGNOSIS — D72.829 LEUKOCYTOSIS, UNSPECIFIED TYPE: ICD-10-CM

## 2021-03-26 LAB
BANDED NEUTROPHILS RELATIVE PERCENT: 1 % (ref 0–7)
BASOPHILS ABSOLUTE: 0 K/UL (ref 0–0.2)
BASOPHILS RELATIVE PERCENT: 0 %
BLOOD SMEAR REVIEW: NORMAL
EOSINOPHILS ABSOLUTE: 0.1 K/UL (ref 0–0.6)
EOSINOPHILS RELATIVE PERCENT: 1 %
HCT VFR BLD CALC: 43.9 % (ref 36–48)
HEMOGLOBIN: 14.7 G/DL (ref 12–16)
LYMPHOCYTES ABSOLUTE: 6.2 K/UL (ref 1–5.1)
LYMPHOCYTES RELATIVE PERCENT: 45 %
MCH RBC QN AUTO: 29 PG (ref 26–34)
MCHC RBC AUTO-ENTMCNC: 33.5 G/DL (ref 31–36)
MCV RBC AUTO: 86.6 FL (ref 80–100)
MONOCYTES ABSOLUTE: 0 K/UL (ref 0–1.3)
MONOCYTES RELATIVE PERCENT: 0 %
NEUTROPHILS ABSOLUTE: 7.5 K/UL (ref 1.7–7.7)
NEUTROPHILS RELATIVE PERCENT: 53 %
PDW BLD-RTO: 14.9 % (ref 12.4–15.4)
PLATELET # BLD: 299 K/UL (ref 135–450)
PLATELET SLIDE REVIEW: ADEQUATE
PMV BLD AUTO: 8.7 FL (ref 5–10.5)
RBC # BLD: 5.07 M/UL (ref 4–5.2)
SLIDE REVIEW: ABNORMAL
WBC # BLD: 13.8 K/UL (ref 4–11)

## 2021-03-29 DIAGNOSIS — D72.829 LEUKOCYTOSIS, UNSPECIFIED TYPE: Primary | ICD-10-CM

## 2021-03-29 LAB — HEMATOLOGY PATH CONSULT: NORMAL

## 2021-03-31 ENCOUNTER — OFFICE VISIT (OUTPATIENT)
Dept: BARIATRICS/WEIGHT MGMT | Age: 31
End: 2021-03-31
Payer: COMMERCIAL

## 2021-03-31 VITALS
DIASTOLIC BLOOD PRESSURE: 83 MMHG | WEIGHT: 293 LBS | BODY MASS INDEX: 41.02 KG/M2 | TEMPERATURE: 97.4 F | SYSTOLIC BLOOD PRESSURE: 126 MMHG | HEART RATE: 103 BPM | HEIGHT: 71 IN

## 2021-03-31 DIAGNOSIS — E66.01 MORBID OBESITY WITH BMI OF 50.0-59.9, ADULT (HCC): Primary | ICD-10-CM

## 2021-03-31 DIAGNOSIS — Z01.818 PRE-OPERATIVE CLEARANCE: ICD-10-CM

## 2021-03-31 PROCEDURE — 99204 OFFICE O/P NEW MOD 45 MIN: CPT | Performed by: SURGERY

## 2021-03-31 NOTE — PROGRESS NOTES
Lisa Rosa is a 33 y.o. female with a date of birth of 1990. Vitals:    03/31/21 0904   BP: 126/83   Pulse: 103   Temp: 97.4 °F (36.3 °C)    BMI: Body mass index is 52.59 kg/m². Obesity Classification: Class III    Weight History: Wt Readings from Last 3 Encounters:   03/31/21 (!) 371 lb 12.8 oz (168.6 kg)   02/22/21 (!) 368 lb (166.9 kg)   07/30/20 (!) 348 lb (157.9 kg)       Patient's lowest adult weight was 260 lbs at age 23. Patient's highest adult weight was 371.8 lbs at age 33. Patient has participated in the following weight loss programs: , food choice and portion control. Patient has not participated in meal replacement/liquid diets. Patient has not participated in weight loss medications. Patient is not lactose intolerant. Patient does not have Sabianism/cultural food concerns. Patient does not have food allergies. Patient does tolerate artificial sweeteners. Patient does have a regular sleep/wake schedule; she struggles to sleep  24 hour recall/food frequency chart:  Breakfast: no.   Snack: no  Lunch: yes. Skips OR orders fast food - [employer] or [employer]  Snack: yes. grapes  Dinner: yes. Fast food OR chix cody OR chix, corn, green beans  Snack: no.   Drinks throughout the day: water - 3 to 4c, sweet tea, Mtn Dew - 1 can  Do you drink alcohol? Yes. How often/how much alcohol do you drink: 4 Mixed drinks per year. Patient does not meet the criteria for binge eating disorder. Patient does not have grazing. Patient does not have night eating. Patient does not have a history of emotional eating or eating out of boredom. Surgery  Patient does feel confident in her ability to make these changes. The patient's expectations of post-surgical eating habits are realistic. Patient states she does understand the consequences of not complying with post-op food guidelines.   Patient states she does understands the long term changes in food intake that will be necessary for all occasions after surgery for the rest of her life. Patient is deemed nutritionally appropriate to proceed. Goals  Weight: lost at least 70lb  Health Improvement: avoid weight related illnesses, improve mobility and energy level    Assessment  Nutritional Needs: RMR=(9.99 x 169) + (6.25 x 179) - (4.92 x 31 y.o.) -161  = 2484 kcal x 1.4 (sedentary activity factor)= 3478 kcal - 1000 (for 2 lb weight loss/week)= 2478 kcal.    Plan  Plan/Recommendations: Start presurgical guidelines. Goals:   -Eat 4-5 times daily  -Avoid high fat and high sugar foods  -Include protein with all meals and snacks  -Avoid carbonation and caffeine  -Avoid calorie containing beverages  -Increase physical activity as tolerated    PES Statement:  Overweight/Obesity related to lack of exercise, sedentary lifestyle, unhealthy eating habits, and unsuccessful diet attempts as evidenced by BMI. Body mass index is 52.59 kg/m². Will follow up as necessary.     Celestia Kussmaul

## 2021-03-31 NOTE — PROGRESS NOTES
[facility]) Physicians   General & Laparoscopic Surgery  [employer]      HPI:    Chris Guardado is a very pleasant 33 y.o. obese female ,   Body mass index is 52.59 kg/m². And multiple medical problems who is presenting for weight loss surgery evaluation and consultation by Dr. Wyatt Schultz. Patient has been struggling for several years now with obesity. Patient feels the weight is an obstacle to achieve and perform things in daily living as well risk on health. Tries to diet, and exercise but can't keep the weight off. Patient tried other regimens, but with no sustainable weight loss. Patient  is very determined to lose weight and be healthy, and is interested in surgical weight loss to achieve this goal.    Otherwise patient denies any nausea, vomiting, fevers, chills, shortness of breath, chest pain, constipation or urinary symptoms.         Pain Assessment   Denies any abdominal pain     Past Medical History:   Diagnosis Date    Asthma     Generalized anxiety disorder     Moderate episode of recurrent major depressive disorder ([facility] [address] 7/19/2018    Morbid obesity with BMI of 50.0-59.9, adult (HCC)     Pre-operative clearance     Unspecified vitamin D deficiency 4/13/2011     Past Surgical History:   Procedure Laterality Date    DILATION AND CURETTAGE OF UTERUS  4/19/2013     Family History   Problem Relation Age of Onset    Hypertension Mother     Migraines Mother     Cancer Father     Hypertension Father     Elevated Lipids Father     Diabetes Father     Heart Attack Paternal Grandmother     Hypertension Paternal Grandmother     Rheum Arthritis Neg Hx     Osteoarthritis Neg Hx     Asthma Neg Hx     Breast Cancer Neg Hx     Heart Failure Neg Hx     High Cholesterol Neg Hx     Ovarian Cancer Neg Hx     Rashes/Skin Problems Neg Hx     Seizures Neg Hx     Stroke Neg Hx     Thyroid Disease Neg Hx      Social History     Tobacco Use    Smoking status: Never Smoker    Smokeless tobacco: Never Used   Substance Use Topics    Alcohol use: No     I counseled the patient on the importance of not smoking and risks of ETOH. No Known Allergies  Vitals:    03/31/21 0904   BP: 126/83   Site: Right Wrist   Position: Sitting   Cuff Size: Large Adult   Pulse: 103   Temp: 97.4 °F (36.3 °C)   TempSrc: Temporal   Weight: (!) 371 lb 12.8 oz (168.6 kg)   Height: 5' 10.5\" (1.791 m)       Body mass index is 52.59 kg/m². Current Outpatient Medications:     vitamin D (ERGOCALCIFEROL) 1.25 MG (83115 UT) CAPS capsule, Take 1 capsule by mouth once a week for 8 doses, Disp: 8 capsule, Rfl: 0    etonogestrel-ethinyl estradiol (ELURYNG) 0.12-0.015 MG/24HR vaginal ring, Place 1 each vaginally See Admin Instructions, Disp: 3 each, Rfl: 1    albuterol sulfate HFA (PROVENTIL HFA) 108 (90 Base) MCG/ACT inhaler, Inhale 2 puffs into the lungs every 6 hours as needed for Wheezing or Shortness of Breath, Disp: 1 Inhaler, Rfl: 3      Review of Systems - History obtained from the patient  General ROS: negative  Psychological ROS: negative  Ophthalmic ROS: negative  Neurological ROS: negative  ENT ROS: negative  Allergy and Immunology ROS: negative  Hematological and Lymphatic ROS: negative  Endocrine ROS: negative  Breast ROS: negative  Respiratory ROS: negative  Cardiovascular ROS: negative  Gastrointestinal ROS:negative  Genito-Urinary ROS: negative  Musculoskeletal ROS: negative   Skin ROS: negative    Physical Exam   Constitutional: Patient is oriented to person, place, and time. Vital signs are normal. Patient  appears well-developed and well-nourished. Patient  is active and cooperative. Non-toxic appearance. No distress. HENT:   Head: Normocephalic and atraumatic. Head is without laceration. Right Ear: External ear normal. No lacerations. No drainage, swelling or tenderness. Left Ear: External ear normal. No lacerations. No drainage, swelling or tenderness.    Nose: Nose normal. No nose lacerations or nasal deformity. Mouth/Throat: Uvula is midline, oropharynx is clear and moist and mucous membranes are normal. No oropharyngeal exudate. Eyes: Conjunctivae, EOM and lids are normal. Pupils are equal, round, and reactive to light. Right eye exhibits no discharge. No foreign body present in the right eye. Left eye exhibits no discharge. No foreign body present in the left eye. No scleral icterus. Neck: Trachea normal and normal range of motion. Neck supple. No JVD present. No tracheal tenderness present. Carotid bruit is not present. No rigidity. No tracheal deviation and no edema present. No thyromegaly present. Cardiovascular: Normal rate, regular rhythm, normal heart sounds, intact distal pulses and normal pulses. Pulmonary/Chest: Effort normal and breath sounds normal. No stridor. No respiratory distress. Patient  has no wheezes. Patient has no rales. Patient exhibits no tenderness and no crepitus. Abdominal: Soft. Normal appearance and bowel sounds are normal. Patient exhibits no distension, no abdominal bruit, no ascites and no mass. There is no hepatosplenomegaly. There is no tenderness. There is no rigidity, no rebound, no guarding and no CVA tenderness. No hernia. Hernia confirmed negative in the ventral area. Musculoskeletal: Normal range of motion. Patient exhibits no edema or tenderness. Lymphadenopathy:        Head (right side): No submental, no submandibular, no preauricular, no posterior auricular and no occipital adenopathy present. Head (left side): No submental, no submandibular, no preauricular, no posterior auricular and no occipital adenopathy present. Patient  has no cervical adenopathy. Right: No supraclavicular adenopathy present. Left: No supraclavicular adenopathy present. Neurological: Patient is alert and oriented to person, place, and time. Patient has normal strength. Coordination and gait normal. GCS eye subscore is 4.  GCS verbal subscore is 5. GCS motor subscore is 6. Skin: Skin is warm and dry. No abrasion and no rash noted. Patient  is not diaphoretic. No cyanosis or erythema. Psychiatric: Patient has a normal mood and affect. speech is normal and behavior is normal. Cognition and memory are normal.             A/P  Jonn Alicea is a very pleasant 33 y.o. female with Obesity,  Body mass index is 52.59 kg/m². and multiple obesity related co-morbidities. Jonn Alicea is very motivated to lose weight and being more healthy. We discussed how her weight affects her overall health including:  Garrett Cárdenas was seen today for bariatric, initial visit. Diagnoses and all orders for this visit:    Morbid obesity with BMI of 50.0-59.9, adult ([facility]  -     Hemoglobin A1C; Future  -     Iron and TIBC; Future  -     Lipid Panel; Future  -     TSH with Reflex; Future  -     Vitamin B12 & Folate; Future  -     [facility] - Oliver Petty MD, Sleep Medicine, [facility]    Pre-operative clearance  -     Hemoglobin A1C; Future  -     Iron and TIBC; Future  -     Lipid Panel; Future  -     TSH with Reflex; Future  -     Vitamin B12 & Folate; Future  -     Reginald Moody MD, Sleep Medicine, [facility]      The patient underwent 30 minutes of dietary counseling. I have reviewed, discussed and agree with the dietary plan. Medical weight loss and different surgical options were discussed in details with patient. Jonn Alicea is interested in surgical weight loss. Patient is interested in Laparoscopic Sleeve Gastrectomy, which I believe is an excellent option for the patient. We will proceed with pre-operative work up labs and studies. Will also petition patient's  insurance for approval for this procedure. Patient received dietary handouts and education. Patient advised that its their responsibility to follow up for studies and/or labs ordered today.    Also discussed in details the importance of follow up, as well following the recommendations and completing the whole program to improve outcomes when it comes to healthier lifestyle as well weight loss. Patient also advised about risks and benefits being on a strict dietary regimen as well using supplements. Patient agrees and wants to proceed with weight loss planning     Labs ordered. Sleep Clearance. Psych Evaluation. Sleep Study. EGD  Support Group. PCP Letter. Weight History    F/U in 4 weeks. Patient advised that its their responsibility to follow up for studies and/or labs ordered today.

## 2021-04-01 ENCOUNTER — TELEPHONE (OUTPATIENT)
Dept: PRIMARY CARE CLINIC | Age: 31
End: 2021-04-01

## 2021-04-01 NOTE — TELEPHONE ENCOUNTER
Dr Glaser Duty office:   Just fyi:   Calling to let the office know that they have been unsuccessful in reaching pt to schedule ov. Just an fyi. thank you  Gorge: [phone]

## 2021-04-08 DIAGNOSIS — E66.01 MORBID OBESITY WITH BMI OF 50.0-59.9, ADULT (HCC): ICD-10-CM

## 2021-04-08 DIAGNOSIS — Z01.818 PRE-OPERATIVE CLEARANCE: ICD-10-CM

## 2021-04-08 LAB
CHOLESTEROL, TOTAL: 125 MG/DL (ref 0–199)
FOLATE: 4.93 NG/ML (ref 4.78–24.2)
HDLC SERPL-MCNC: 60 MG/DL (ref 40–60)
IRON SATURATION: 21 % (ref 15–50)
IRON: 66 UG/DL (ref 37–145)
LDL CHOLESTEROL CALCULATED: 46 MG/DL
TOTAL IRON BINDING CAPACITY: 311 UG/DL (ref 260–445)
TRIGL SERPL-MCNC: 97 MG/DL (ref 0–150)
TSH REFLEX: 1.47 UIU/ML (ref 0.27–4.2)
VITAMIN B-12: 415 PG/ML (ref 211–911)
VLDLC SERPL CALC-MCNC: 19 MG/DL

## 2021-04-09 LAB
ESTIMATED AVERAGE GLUCOSE: 114 MG/DL
HBA1C MFR BLD: 5.6 %

## 2021-04-12 ENCOUNTER — OFFICE VISIT (OUTPATIENT)
Dept: PRIMARY CARE CLINIC | Age: 31
End: 2021-04-12
Payer: COMMERCIAL

## 2021-04-12 VITALS
HEART RATE: 89 BPM | BODY MASS INDEX: 41.02 KG/M2 | WEIGHT: 293 LBS | SYSTOLIC BLOOD PRESSURE: 130 MMHG | DIASTOLIC BLOOD PRESSURE: 86 MMHG | TEMPERATURE: 97.2 F | HEIGHT: 71 IN

## 2021-04-12 DIAGNOSIS — E66.01 MORBID OBESITY WITH BMI OF 50.0-59.9, ADULT (HCC): Primary | ICD-10-CM

## 2021-04-12 PROCEDURE — 99212 OFFICE O/P EST SF 10 MIN: CPT | Performed by: NURSE PRACTITIONER

## 2021-04-12 ASSESSMENT — ENCOUNTER SYMPTOMS
COUGH: 0
SHORTNESS OF BREATH: 0

## 2021-04-12 NOTE — LETTER
[facility] Primary [facility]  [id number] [facility] [address]  Phone: [phone]  Fax: [phone]    LISA Mcneal        April 12, 2021     Patient: Yola Saleh   YOB: 1990   Date of Visit: 4/12/2021       To Whom It May Concern:    Carito Bales has been seen by our office for 2 years. She suffers from the following co morbidities: depression. Her current weight is 376 pounds and a BMI of 53.19. Previous weight readings from last 3 encounters; 376 lb (4/12/2021), 371 lb (3/31/2021) and 368 lb (2/22/2021). The patient has undergone the following weight loss attempts: diet, exercise, weight loss pills. I feel this patient would benefit from weight loss surgery because she has been unsuccessful losing weight with other diet methods and she may develop life threatening medical conditions if she does not get help getting her weight under control. I appreciate your consideration for approval.    If you have any questions or concerns, please don't hesitate to call.     Sincerely,          LISA Mcneal

## 2021-04-12 NOTE — PROGRESS NOTES
Date: 4/12/2021                                               Subjective/Objective:     Chief Complaint   Patient presents with    Weight Loss     needs a letter for weight loss surgery       HPI     Sera Finnegan is a 34 yo female, here to obtain letter of medical necessity for weight loss surgery. Patient is being followed by Dr. Sammy Nolan with bariatric surgery. She is here today to obtain letter of medical necessity for weight loss surgery. She tells me that she has had challenges with her weight for her entire life. Previous attempts to lose weight have included regular exercise, diet changes and prescription weight loss pills. He is at times have been unsuccessful in losing maintaining weight loss. Denies any concerns today. Denies chest pain, shortness of breath.          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    Vitamin D deficiency 04/13/2011    Headache 04/12/2011    Edema 04/12/2011    Obesity 04/12/2011       Past Medical History:   Diagnosis Date    Asthma     Generalized anxiety disorder     Moderate episode of recurrent major depressive disorder ([address] 7/19/2018    Morbid obesity with BMI of 50.0-59.9, adult ([address]     Pre-operative clearance     Unspecified vitamin D deficiency 4/13/2011       Past Surgical History:   Procedure Laterality Date    DILATION AND CURETTAGE OF UTERUS  4/19/2013       Orders Only on 04/08/2021   Component Date Value Ref Range Status    Vitamin B-12 04/08/2021 415  211 - 911 pg/mL Final    Folate 04/08/2021 4.93  4.78 - 24.20 ng/mL Final Comment: Effective 11-15-16 10:00am EST  Please note reference ranges have  changed for Folate.       TSH 04/08/2021 1.47  0.27 - 4.20 uIU/mL Final    Cholesterol, Total 04/08/2021 125  0 - 199 mg/dL Final    Triglycerides 04/08/2021 97  0 - 150 mg/dL Final    HDL 04/08/2021 60  40 - 60 mg/dL Final    LDL Calculated 04/08/2021 46  <100 mg/dL Final    VLDL Cholesterol Calculated 04/08/2021 19  Not Established mg/dL Final    Iron 04/08/2021 66  37 - 145 ug/dL Final    TIBC 04/08/2021 311  260 - 445 ug/dL Final    Iron Saturation 04/08/2021 21  15 - 50 % Final    Hemoglobin A1C 04/08/2021 5.6  See comment % Final    Comment: Comment:  Diagnosis of Diabetes: > or = 6.5%  Increased risk of diabetes (Prediabetes): 5.7-6.4%  Glycemic Control: Nonpregnant Adults: <7.0%                    Pregnant: <6.0%        eAG 04/08/2021 114.0  mg/dL Final       Family History   Problem Relation Age of Onset    Hypertension Mother     Migraines Mother     Cancer Father     Hypertension Father     Elevated Lipids Father     Diabetes Father     Heart Attack Paternal Grandmother     Hypertension Paternal Grandmother     Rheum Arthritis Neg Hx     Osteoarthritis Neg Hx     Asthma Neg Hx     Breast Cancer Neg Hx     Heart Failure Neg Hx     High Cholesterol Neg Hx     Ovarian Cancer Neg Hx     Rashes/Skin Problems Neg Hx     Seizures Neg Hx     Stroke Neg Hx     Thyroid Disease Neg Hx        Current Outpatient Medications   Medication Sig Dispense Refill    vitamin D (ERGOCALCIFEROL) 1.25 MG (32571 UT) CAPS capsule Take 1 capsule by mouth once a week for 8 doses 8 capsule 0    etonogestrel-ethinyl estradiol (ELURYNG) 0.12-0.015 MG/24HR vaginal ring Place 1 each vaginally See Admin Instructions 3 each 1    albuterol sulfate HFA (PROVENTIL HFA) 108 (90 Base) MCG/ACT inhaler Inhale 2 puffs into the lungs every 6 hours as needed for Wheezing or Shortness of Breath 1 Inhaler 3     No current facility-administered medications for this visit. No Known Allergies    Review of Systems   Constitutional: Negative for chills and fever. Respiratory: Negative for cough and shortness of breath. Cardiovascular: Negative for chest pain. Neurological: Negative for dizziness and headaches. Vitals:  /86   Pulse 89   Temp 97.2 °F (36.2 °C) (Temporal)   Ht 5' 10.5\" (1.791 m)   Wt (!) 376 lb (170.6 kg)   BMI 53.19 kg/m²     Physical Exam  Vitals signs reviewed. Constitutional:       General: She is not in acute distress. Appearance: Normal appearance. HENT:      Head: Normocephalic and atraumatic. Cardiovascular:      Rate and Rhythm: Normal rate and regular rhythm. Heart sounds: Normal heart sounds. No murmur. Pulmonary:      Effort: Pulmonary effort is normal. No respiratory distress. Breath sounds: Normal breath sounds. No wheezing. Skin:     General: Skin is warm and dry. Neurological:      General: No focal deficit present. Mental Status: She is alert and oriented to person, place, and time. Psychiatric:         Mood and Affect: Mood normal.         Behavior: Behavior normal.         Thought Content: Thought content normal.         Judgment: Judgment normal.         Assessment/Plan     1. Morbid obesity with BMI of 50.0-59.9, adult [facility]): Visit today to obtain letter for medical necessity for surgery. Letter provided. Patient encouraged to continue with diet/lifestyle modifications and exercise. No orders of the defined types were placed in this encounter. No follow-ups on file. OR sooner with questions, concerns, worsening symptoms    LAVINIA LEI NP    4/12/2021  9:04 AM    Discussed use, benefit, and side effects of prescribed medications. Barriers to medication compliance addressed. Discussed all ordered testing and labs. All patient questions answered. Patient agreeable with plan above.      Please note that this chart was generated using dragon dictation software. Although every effort was made to ensure the accuracy of this automated transcription, some errors in transcription may have occurred.

## 2021-04-13 DIAGNOSIS — E55.9 VITAMIN D DEFICIENCY: ICD-10-CM

## 2021-04-14 RX ORDER — ERGOCALCIFEROL 1.25 MG/1
CAPSULE ORAL
Qty: 8 CAPSULE | Refills: 0 | Status: SHIPPED | OUTPATIENT
Start: 2021-04-14 | End: 2021-04-22 | Stop reason: ALTCHOICE

## 2021-04-15 ENCOUNTER — TELEPHONE (OUTPATIENT)
Dept: ORTHOPEDIC SURGERY | Age: 31
End: 2021-04-15

## 2021-04-22 ENCOUNTER — OFFICE VISIT (OUTPATIENT)
Dept: SLEEP MEDICINE | Age: 31
End: 2021-04-22
Payer: COMMERCIAL

## 2021-04-22 VITALS
HEIGHT: 71 IN | WEIGHT: 293 LBS | TEMPERATURE: 97.3 F | DIASTOLIC BLOOD PRESSURE: 76 MMHG | SYSTOLIC BLOOD PRESSURE: 130 MMHG | OXYGEN SATURATION: 96 % | BODY MASS INDEX: 41.02 KG/M2 | RESPIRATION RATE: 14 BRPM | HEART RATE: 97 BPM

## 2021-04-22 DIAGNOSIS — G47.33 OBSTRUCTIVE SLEEP APNEA: Primary | ICD-10-CM

## 2021-04-22 DIAGNOSIS — E66.01 MORBID OBESITY WITH BMI OF 50.0-59.9, ADULT (HCC): ICD-10-CM

## 2021-04-22 PROCEDURE — 99204 OFFICE O/P NEW MOD 45 MIN: CPT | Performed by: PSYCHIATRY & NEUROLOGY

## 2021-04-22 ASSESSMENT — ENCOUNTER SYMPTOMS
EYES NEGATIVE: 1
GASTROINTESTINAL NEGATIVE: 1
SHORTNESS OF BREATH: 1
ALLERGIC/IMMUNOLOGIC NEGATIVE: 1

## 2021-04-22 ASSESSMENT — SLEEP AND FATIGUE QUESTIONNAIRES
NECK CIRCUMFERENCE (INCHES): 18
ESS TOTAL SCORE: 6
HOW LIKELY ARE YOU TO NOD OFF OR FALL ASLEEP WHEN YOU ARE A PASSENGER IN A CAR FOR AN HOUR WITHOUT A BREAK: 0
HOW LIKELY ARE YOU TO NOD OFF OR FALL ASLEEP WHILE SITTING AND READING: 2
HOW LIKELY ARE YOU TO NOD OFF OR FALL ASLEEP IN A CAR, WHILE STOPPED FOR A FEW MINUTES IN TRAFFIC: 0
HOW LIKELY ARE YOU TO NOD OFF OR FALL ASLEEP WHILE SITTING QUIETLY AFTER LUNCH WITHOUT ALCOHOL: 1
HOW LIKELY ARE YOU TO NOD OFF OR FALL ASLEEP WHILE SITTING AND TALKING TO SOMEONE: 0

## 2021-04-22 NOTE — PROGRESS NOTES
MD CRISTINA Heaton Board Certified in Sleep Medicine  Certified [facility] Sleep Medicine  Board Certified in Neurology [address]  [address] [facility] [place] [address]  [address], [address]           [address]  [address] [facility] [address] [phone]    Subjective:     Patient ID: Charley Washburn is a 33 y.o. female. Chief Complaint   Patient presents with    New Patient     sleep apnea       HPI:        Charley Washburn is a 33 y.o. female referred by Dr. Jacek Darden for a sleep evaluation. She complains of snoring, tossing and turning, morning headache but she denies snorting, choking, periods of not breathing, kicking, knees buckling with laughing, completely or partially paralyzed while falling asleep or waking up, take naps during the day, noisy environment, uncomfortable room temperature, uncomfortable bedding. Symptoms began a few years ago, unchanged since that time. The patient's family confirmed the snoring without stopped breathing at night  SLEEP SCHEDULE: Goes to bed around 9:15 PM in the weekdays and 10 PM in the weekends. It usually takes the patient 45 minutes to fall asleep. The patient gets up 2 per night to go to the bathroom. The Patient finally gets up at 5 AM during the weekdays and 6 AM in the weekends. patient wakes up with  sometimes morning headache. . the headache usually dull headache lasts 30-60 minutes. The patient has restless sleep with frequent arousals in addition to the Patient has significant daytime sleepiness. The Patient scored Total score: 6 on Clayton Sleepiness Scale ( more than 10 is indicative of daytime sleepiness)and 21 in fatigue scale ( more than 36 is indicative of daytime fatigue). The patient takes no naps. Previous evaluation and treatment has included- none.     The Patient has been obese for many years and tried, has gained 20 in the last 5 years,  unsuccessfully to lose weight through diet, exercise. DOT/CDL - N/A  DONY/Thomas - N/A      Previous Report(s) Reviewed: historical medical records       Social History     Socioeconomic History    Marital status: Single     Spouse name: Not on file    Number of children: Not on file    Years of education: Not on file    Highest education level: Not on file   Occupational History    Not on file   Social Needs    Financial resource strain: Not on file    Food insecurity     Worry: Not on file     Inability: Not on file    Transportation needs     Medical: Not on file     Non-medical: Not on file   Tobacco Use    Smoking status: Never Smoker    Smokeless tobacco: Never Used   Substance and Sexual Activity    Alcohol use: No    Drug use: No    Sexual activity: Yes     Partners: Male   Lifestyle    Physical activity     Days per week: Not on file     Minutes per session: Not on file    Stress: Not on file   Relationships    Social connections     Talks on phone: Not on file     Gets together: Not on file     Attends Yazdanism service: Not on file     Active member of club or organization: Not on file     Attends meetings of clubs or organizations: Not on file     Relationship status: Not on file    Intimate partner violence     Fear of current or ex partner: Not on file     Emotionally abused: Not on file     Physically abused: Not on file     Forced sexual activity: Not on file   Other Topics Concern    Not on file   Social History Narrative    Not on file       Prior to Admission medications    Medication Sig Start Date End Date Taking?  Authorizing Provider   etonogestrel-ethinyl estradiol Vicente Mount) 0.12-0.015 MG/24HR vaginal ring Place 1 each vaginally See Admin Instructions 1/19/21  Yes aMhi Marquez MD   albuterol sulfate HFA (PROVENTIL HFA) 108 (90 Base) MCG/ACT inhaler Inhale 2 puffs into the lungs every 6 hours as needed for Wheezing or Shortness of Breath 6/80/89  Yes Dontrell Brown MD       Allergies as of 04/22/2021    (No Known Allergies)       Patient Active Problem List   Diagnosis    Headache    Edema    Obesity    Vitamin D deficiency    Molar pregnancy    Contusion of right knee    Wrist sprain    Insomnia    Snores    Other fatigue    Moderate episode of recurrent major depressive disorder (HCC)    Generalized anxiety disorder    Tinea corporis    Pre-operative clearance    Morbid obesity with BMI of 50.0-59.9, adult (HCC)       Past Medical History:   Diagnosis Date    Asthma     Generalized anxiety disorder     Moderate episode of recurrent major depressive disorder ([facility] [address] 7/19/2018    Morbid obesity with BMI of 50.0-59.9, adult ([facility])     Pre-operative clearance     Unspecified vitamin D deficiency 4/13/2011       Past Surgical History:   Procedure Laterality Date    DILATION AND CURETTAGE OF UTERUS  4/19/2013       Family History   Problem Relation Age of Onset    Hypertension Mother     Migraines Mother     Cancer Father     Hypertension Father     Elevated Lipids Father     Diabetes Father     Heart Attack Paternal Grandmother     Hypertension Paternal Grandmother     Rheum Arthritis Neg Hx     Osteoarthritis Neg Hx     Asthma Neg Hx     Breast Cancer Neg Hx     Heart Failure Neg Hx     High Cholesterol Neg Hx     Ovarian Cancer Neg Hx     Rashes/Skin Problems Neg Hx     Seizures Neg Hx     Stroke Neg Hx     Thyroid Disease Neg Hx        Review of Systems   Constitutional: Positive for unexpected weight change. HENT: Negative. Eyes: Negative. Respiratory: Positive for shortness of breath. Cardiovascular: Negative. Negative for leg swelling. Gastrointestinal: Negative. Endocrine: Negative. Genitourinary: Positive for frequency (2). Musculoskeletal: Negative. Skin: Negative. Allergic/Immunologic: Negative. Home Sleep Study   2. Morbid obesity with BMI of 50.0-59.9, adult ([facility]  Home Sleep Study     Plan:     Patient was counseled about the pathophysiology of obstructive sleep apnea syndrome and the methods for evaluating its presence and severity. Patient was counseled to avoid driving and other potentially hazardous circumstances if the patient is experiencing excessive sleepiness. Treatment considerations include the use of nasal CPAP, oral dental appliance or a surgical intervention, which should be based on otolarygologic findings, In the meantime, the patient should be cautioned to avoid the use of alcohol or other depressant medications because of potential for increasing the duration and severity of apnea and cautioned regarding driving or operating and dangerous equipment if the patient is experiencing daytime sleepiness. .    Most likely the patient will benefit from the gastric sleeve surgery in treating of the obstructive sleep apnea. In 2013, in a study published in Obesity Surgery Journal  A total of 69 studies with 13,900 patients were included and revealed that all the procedures achieved profound effects on FARA, as over 75 % of patients saw at least an improvement in their sleep apnea, bariatric surgery is a definitive treatment for obstructive sleep apnea, regardless of the specific type of surgery. We discussed the proportionality between weight and AHI. With 10% weight change, the AHI has a 27% proportionate change. With 20% weight change, the AHI has a 45-50% proportionate change. .     Orders Placed This Encounter   Procedures    Home Sleep Study       Return in about 3 months (around 7/22/2021) for Reveiwing CPAP usage and compliance report and tro.     Lg Montgomery MD  Medical Director [address] [place] [address]

## 2021-04-28 ENCOUNTER — OFFICE VISIT (OUTPATIENT)
Dept: BARIATRICS/WEIGHT MGMT | Age: 31
End: 2021-04-28
Payer: COMMERCIAL

## 2021-04-28 ENCOUNTER — TELEPHONE (OUTPATIENT)
Dept: BARIATRICS/WEIGHT MGMT | Age: 31
End: 2021-04-28

## 2021-04-28 VITALS
HEART RATE: 98 BPM | SYSTOLIC BLOOD PRESSURE: 168 MMHG | BODY MASS INDEX: 41.02 KG/M2 | DIASTOLIC BLOOD PRESSURE: 105 MMHG | HEIGHT: 71 IN | WEIGHT: 293 LBS

## 2021-04-28 DIAGNOSIS — E66.01 MORBID OBESITY WITH BMI OF 50.0-59.9, ADULT (HCC): Primary | ICD-10-CM

## 2021-04-28 PROCEDURE — 99213 OFFICE O/P EST LOW 20 MIN: CPT | Performed by: SURGERY

## 2021-04-28 NOTE — TELEPHONE ENCOUNTER
Patient was seen in the office today and needs a doctors excuse for her visit today for her employer.

## 2021-04-28 NOTE — PROGRESS NOTES
[facility]) Physicians   General & Laparoscopic Surgery  [employer]       HPI:     Bere Bear is a very pleasant 33 y.o. female with Body mass index is 52.74 kg/m². , Pre-Surgery. Pre-operative clearance and work up pending. Working hard to keep good dietary habits as well level of activity. Patient denies any nausea, vomiting, fevers, chills, shortness of breath, chest pain, cough, constipation or difficulty urinating. Past Medical History:   Diagnosis Date    Asthma     Generalized anxiety disorder     Moderate episode of recurrent major depressive disorder ([facility] [address] 7/19/2018    Morbid obesity with BMI of 50.0-59.9, adult ([facility])     Pre-operative clearance     Unspecified vitamin D deficiency 4/13/2011     Past Surgical History:   Procedure Laterality Date    DILATION AND CURETTAGE OF UTERUS  4/19/2013     Family History   Problem Relation Age of Onset    Hypertension Mother     Migraines Mother     Cancer Father     Hypertension Father     Elevated Lipids Father     Diabetes Father     Heart Attack Paternal Grandmother     Hypertension Paternal Grandmother     Rheum Arthritis Neg Hx     Osteoarthritis Neg Hx     Asthma Neg Hx     Breast Cancer Neg Hx     Heart Failure Neg Hx     High Cholesterol Neg Hx     Ovarian Cancer Neg Hx     Rashes/Skin Problems Neg Hx     Seizures Neg Hx     Stroke Neg Hx     Thyroid Disease Neg Hx      Social History     Tobacco Use    Smoking status: Never Smoker    Smokeless tobacco: Never Used   Substance Use Topics    Alcohol use: No     I counseled the patient on the importance of not smoking and risks of ETOH. No Known Allergies  Vitals:    04/28/21 0706   BP: (!) 168/105   Pulse: 98   Weight: (!) 372 lb 12.8 oz (169.1 kg)   Height: 5' 10.5\" (1.791 m)       Body mass index is 52.74 kg/m².       Current Outpatient Medications:     etonogestrel-ethinyl estradiol (ELURYNG) 0.12-0.015 MG/24HR vaginal ring, Place 1 each vaginally See Admin Instructions, Disp: 3 each, Rfl: 1    albuterol sulfate HFA (PROVENTIL HFA) 108 (90 Base) MCG/ACT inhaler, Inhale 2 puffs into the lungs every 6 hours as needed for Wheezing or Shortness of Breath, Disp: 1 Inhaler, Rfl: 3      Review of Systems - History obtained from the patient  General ROS: negative  Psychological ROS: negative  Endocrine ROS: negative  Respiratory ROS: negative  Cardiovascular ROS: negative  Gastrointestinal ROS:negative  Genito-Urinary ROS: negative  Musculoskeletal ROS: negative   Skin ROS: negative    Physical Exam   Vitals Reviewed   Constitutional: Patient is oriented to person, place, and time. Patient appears well-developed and well-nourished. Patient is active and cooperative. Non-toxic appearance. No distress. Neck: Trachea normal and normal range of motion. No JVD present. Pulmonary/Chest: Effort normal. No accessory muscle usage or stridor. No apnea. No respiratory distress. Cardiovascular: Normal rate and no JVD. Abdominal: Normal appearance. Patient exhibits no distension. Abdomen is soft, obese, non tender. Musculoskeletal: Normal range of motion. Patient exhibits no edema. Neurological: Patient is alert and oriented to person, place, and time. Patient has normal strength. GCS eye subscore is 4. GCS verbal subscore is 5. GCS motor subscore is 6. Skin: Skin is warm and dry. No abrasion and no rash noted. Patient is not diaphoretic. No cyanosis or erythema. Psychiatric: Patient has a normal mood and affect. Speech is normal and behavior is normal. Cognition and memory are normal.       A/P    Chris Tanner is 33 y.o. female, Body mass index is 52.74 kg/m². pre surgery, has gained 1# since last visit. The patient underwent dietary counseling with registered dietician. I have reviewed, discussed and agree with the dietary plan. Patient is trying hard to keep good dietary and behavior modifications.  Patient is monitoring portion sizes, food

## 2021-05-04 ENCOUNTER — OFFICE VISIT (OUTPATIENT)
Dept: PRIMARY CARE CLINIC | Age: 31
End: 2021-05-04
Payer: COMMERCIAL

## 2021-05-04 DIAGNOSIS — Z01.818 PRE-OP EXAMINATION: Primary | ICD-10-CM

## 2021-05-04 PROCEDURE — 99421 OL DIG E/M SVC 5-10 MIN: CPT | Performed by: NURSE PRACTITIONER

## 2021-05-05 LAB — SARS-COV-2: NOT DETECTED

## 2021-05-07 ENCOUNTER — ANESTHESIA EVENT (OUTPATIENT)
Dept: ENDOSCOPY | Age: 31
End: 2021-05-07
Payer: COMMERCIAL

## 2021-05-09 NOTE — ANESTHESIA PRE PROCEDURE
Department of Anesthesiology  Preprocedure Note       Name:  Ilene Hendrickson   Age:  33 y.o.  :  1990                                          MRN:  [id number]         Date:  2021      Surgeon: John Hill): Tony Bazzi DO    Procedure: Procedure(s):  ESOPHAGOGASTRODUODENOSCOPY    Medications prior to admission:   Prior to Admission medications    Medication Sig Start Date End Date Taking? Authorizing Provider   etonogestrel-ethinyl estradiol Mariea Crease) 0.12-0.015 MG/24HR vaginal ring Place 1 each vaginally See Admin Instructions 21   Amisha Villafana MD   albuterol sulfate HFA (PROVENTIL HFA) 108 (90 Base) MCG/ACT inhaler Inhale 2 puffs into the lungs every 6 hours as needed for Wheezing or Shortness of Breath 28   Jose Stubbs MD       Current medications:    No current facility-administered medications for this encounter. 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 01XA    Wrist sprain S63.509A    Insomnia G47.00    Snores R06.83    Other fatigue R53.83    Moderate episode of recurrent major depressive disorder (HCC) F33.1    Generalized anxiety disorder F41.1    Tinea corporis B35.4    Morbid obesity with BMI of 50.0-59.9, adult (HCC) E66.01, Z68.43       Past Medical History:        Diagnosis Date    Asthma     Generalized anxiety disorder     Moderate episode of recurrent major depressive disorder ([facility] [address] 7/19/2018    Morbid obesity with BMI of 50.0-59.9, adult ([facility] [address]     Pre-operative clearance     Unspecified vitamin D deficiency 4/13/2011       Past Surgical History:        Procedure Laterality Date    DILATION AND CURETTAGE OF UTERUS  4/19/2013       Social History:    Social History     Tobacco Use    Smoking status: Never Smoker    Smokeless tobacco: Never Used   Substance Use Topics    Alcohol use: No                                Counseling given: Not Answered      Vital Signs (Current): There were no vitals filed for this visit. BP Readings from Last 3 Encounters:   04/28/21 (!) 168/105   04/22/21 130/76   04/12/21 130/86       NPO Status:                                                                                 BMI:   Wt Readings from Last 3 Encounters:   04/28/21 (!) 372 lb 12.8 oz (169.1 kg)   04/22/21 (!) 375 lb (170.1 kg)   04/12/21 (!) 376 lb (170.6 kg)     There is no height or weight on file to calculate BMI.    CBC:   Lab Results   Component Value Date    WBC 13.8 03/25/2021    RBC 5.07 03/25/2021    HGB 14.7 03/25/2021    HCT 43.9 03/25/2021    MCV 86.6 03/25/2021    RDW 14.9 03/25/2021     03/25/2021       CMP:   Lab Results   Component Value Date     02/22/2021    K 4.1 02/22/2021     02/22/2021    CO2 23 02/22/2021    BUN 7 02/22/2021    CREATININE 0.6 02/22/2021    GFRAA >60 02/22/2021    GFRAA >60 04/19/2013    AGRATIO 1.1 02/22/2021    LABGLOM >60 02/22/2021    GLUCOSE 96 02/22/2021    PROT 7.3 02/22/2021    PROT 6.7 04/12/2011    CALCIUM 10.1 02/22/2021    BILITOT <0.2 02/22/2021    ALKPHOS 84 02/22/2021    AST 15 02/22/2021    ALT 19 02/22/2021       POC Tests: No results for input(s): POCGLU, POCNA, POCK, POCCL, POCBUN, POCHEMO, POCHCT in the last 72 hours.     Coags: No results found for: PROTIME, INR, APTT    HCG (If Applicable):   Lab Results   Component Value Date    PREGTESTUR Negative 01/26/2017        ABGs: No results found for: PHART, PO2ART, GDU8ARS, YQW8FPV, BEART, R4IWDLGK     Type & Screen (If Applicable):  Lab Results   Component Value Date    LABABO O 04/19/2013    LABRH Positive 04/19/2013       Drug/Infectious Status (If Applicable):  No results found for: HIV, HEPCAB    COVID-19 Screening (If Applicable):   Lab Results   Component Value Date    COVID19 Not Detected 05/04/2021           Anesthesia Evaluation  Patient summary reviewed and Nursing notes reviewed no history of anesthetic complications:   Airway: Mallampati: II  TM distance: >3 FB   Neck ROM: full  Mouth opening: > = 3 FB Dental:          Pulmonary:   (+) asthma:                            Cardiovascular:Negative CV ROS                      Neuro/Psych:   (+) headaches:, psychiatric history:            GI/Hepatic/Renal:   (+) morbid obesity          Endo/Other: Negative Endo/Other ROS                    Abdominal:           Vascular:                                        Anesthesia Plan      general     ASA 1    (78-year-old female presents for esophagogastroduodenoscopy. Plan general anesthesia with ASA standard monitors. Questions answered. Patient agreeable with anesthetic plan.  )  Induction: intravenous. Anesthetic plan and risks discussed with patient. Plan discussed with CRNA.     Attending anesthesiologist reviewed and agrees with Pre Eval content        Michael Evangelista MD   5/9/2021

## 2021-05-10 ENCOUNTER — ANESTHESIA (OUTPATIENT)
Dept: ENDOSCOPY | Age: 31
End: 2021-05-10
Payer: COMMERCIAL

## 2021-05-10 ENCOUNTER — HOSPITAL ENCOUNTER (OUTPATIENT)
Age: 31
Setting detail: OUTPATIENT SURGERY
Discharge: HOME | End: 2021-05-10
Attending: SURGERY | Admitting: SURGERY
Payer: COMMERCIAL

## 2021-05-10 ENCOUNTER — HOSPITAL ENCOUNTER (OUTPATIENT)
Dept: SLEEP CENTER | Age: 31
Discharge: HOME | End: 2021-05-10
Payer: COMMERCIAL

## 2021-05-10 VITALS
SYSTOLIC BLOOD PRESSURE: 141 MMHG | DIASTOLIC BLOOD PRESSURE: 94 MMHG | HEART RATE: 70 BPM | WEIGHT: 293 LBS | HEIGHT: 70 IN | BODY MASS INDEX: 41.95 KG/M2 | RESPIRATION RATE: 18 BRPM | TEMPERATURE: 97.8 F | OXYGEN SATURATION: 100 %

## 2021-05-10 VITALS
DIASTOLIC BLOOD PRESSURE: 101 MMHG | SYSTOLIC BLOOD PRESSURE: 142 MMHG | RESPIRATION RATE: 18 BRPM | OXYGEN SATURATION: 100 %

## 2021-05-10 DIAGNOSIS — Z01.818 PREOPERATIVE CLEARANCE: ICD-10-CM

## 2021-05-10 DIAGNOSIS — G47.33 OBSTRUCTIVE SLEEP APNEA: ICD-10-CM

## 2021-05-10 DIAGNOSIS — E66.01 MORBID OBESITY WITH BMI OF 50.0-59.9, ADULT (HCC): ICD-10-CM

## 2021-05-10 PROBLEM — K29.70 GASTRITIS: Status: ACTIVE | Noted: 2021-05-10

## 2021-05-10 LAB — PREGNANCY, URINE: NEGATIVE

## 2021-05-10 PROCEDURE — 84703 CHORIONIC GONADOTROPIN ASSAY: CPT

## 2021-05-10 PROCEDURE — 88305 TISSUE EXAM BY PATHOLOGIST: CPT

## 2021-05-10 PROCEDURE — 95806 SLEEP STUDY UNATT&RESP EFFT: CPT

## 2021-05-10 PROCEDURE — 2580000003 HC RX 258: Performed by: ANESTHESIOLOGY

## 2021-05-10 PROCEDURE — 6360000002 HC RX W HCPCS: Performed by: NURSE ANESTHETIST, CERTIFIED REGISTERED

## 2021-05-10 PROCEDURE — 95806 SLEEP STUDY UNATT&RESP EFFT: CPT | Performed by: PSYCHIATRY & NEUROLOGY

## 2021-05-10 PROCEDURE — 7100000010 HC PHASE II RECOVERY - FIRST 15 MIN: Performed by: SURGERY

## 2021-05-10 PROCEDURE — 43239 EGD BIOPSY SINGLE/MULTIPLE: CPT | Performed by: SURGERY

## 2021-05-10 PROCEDURE — 3700000001 HC ADD 15 MINUTES (ANESTHESIA): Performed by: SURGERY

## 2021-05-10 PROCEDURE — 7100000011 HC PHASE II RECOVERY - ADDTL 15 MIN: Performed by: SURGERY

## 2021-05-10 PROCEDURE — 3700000000 HC ANESTHESIA ATTENDED CARE: Performed by: SURGERY

## 2021-05-10 PROCEDURE — 2500000003 HC RX 250 WO HCPCS: Performed by: NURSE ANESTHETIST, CERTIFIED REGISTERED

## 2021-05-10 PROCEDURE — 2709999900 HC NON-CHARGEABLE SUPPLY: Performed by: SURGERY

## 2021-05-10 PROCEDURE — 3609012400 HC EGD TRANSORAL BIOPSY SINGLE/MULTIPLE: Performed by: SURGERY

## 2021-05-10 RX ORDER — SODIUM CHLORIDE 0.9 % (FLUSH) 0.9 %
5-40 SYRINGE (ML) INJECTION PRN
Status: DISCONTINUED | OUTPATIENT
Start: 2021-05-10 | End: 2021-05-10 | Stop reason: HOSPADM

## 2021-05-10 RX ORDER — SODIUM CHLORIDE, SODIUM LACTATE, POTASSIUM CHLORIDE, CALCIUM CHLORIDE 600; 310; 30; 20 MG/100ML; MG/100ML; MG/100ML; MG/100ML
INJECTION, SOLUTION INTRAVENOUS CONTINUOUS
Status: DISCONTINUED | OUTPATIENT
Start: 2021-05-10 | End: 2021-05-10 | Stop reason: HOSPADM

## 2021-05-10 RX ORDER — GLYCOPYRROLATE 0.2 MG/ML
INJECTION INTRAMUSCULAR; INTRAVENOUS PRN
Status: DISCONTINUED | OUTPATIENT
Start: 2021-05-10 | End: 2021-05-10 | Stop reason: SDUPTHER

## 2021-05-10 RX ORDER — PROPOFOL 10 MG/ML
INJECTION, EMULSION INTRAVENOUS PRN
Status: DISCONTINUED | OUTPATIENT
Start: 2021-05-10 | End: 2021-05-10 | Stop reason: SDUPTHER

## 2021-05-10 RX ORDER — MEPERIDINE HYDROCHLORIDE 25 MG/ML
12.5 INJECTION INTRAMUSCULAR; INTRAVENOUS; SUBCUTANEOUS EVERY 5 MIN PRN
Status: DISCONTINUED | OUTPATIENT
Start: 2021-05-10 | End: 2021-05-10 | Stop reason: HOSPADM

## 2021-05-10 RX ORDER — METOCLOPRAMIDE HYDROCHLORIDE 5 MG/ML
10 INJECTION INTRAMUSCULAR; INTRAVENOUS
Status: DISCONTINUED | OUTPATIENT
Start: 2021-05-10 | End: 2021-05-10 | Stop reason: HOSPADM

## 2021-05-10 RX ORDER — PROMETHAZINE HYDROCHLORIDE 25 MG/ML
6.25 INJECTION, SOLUTION INTRAMUSCULAR; INTRAVENOUS
Status: DISCONTINUED | OUTPATIENT
Start: 2021-05-10 | End: 2021-05-10 | Stop reason: HOSPADM

## 2021-05-10 RX ORDER — DIPHENHYDRAMINE HYDROCHLORIDE 50 MG/ML
12.5 INJECTION INTRAMUSCULAR; INTRAVENOUS
Status: DISCONTINUED | OUTPATIENT
Start: 2021-05-10 | End: 2021-05-10 | Stop reason: HOSPADM

## 2021-05-10 RX ORDER — OXYCODONE HYDROCHLORIDE 5 MG/1
10 TABLET ORAL PRN
Status: DISCONTINUED | OUTPATIENT
Start: 2021-05-10 | End: 2021-05-10 | Stop reason: HOSPADM

## 2021-05-10 RX ORDER — HYDROXYZINE PAMOATE 25 MG/1
25 CAPSULE ORAL 3 TIMES DAILY PRN
COMMUNITY
End: 2021-09-08

## 2021-05-10 RX ORDER — HYDRALAZINE HYDROCHLORIDE 20 MG/ML
5 INJECTION INTRAMUSCULAR; INTRAVENOUS EVERY 10 MIN PRN
Status: DISCONTINUED | OUTPATIENT
Start: 2021-05-10 | End: 2021-05-10 | Stop reason: HOSPADM

## 2021-05-10 RX ORDER — OMEPRAZOLE 20 MG/1
20 CAPSULE, DELAYED RELEASE ORAL
Qty: 30 CAPSULE | Refills: 1 | Status: SHIPPED | OUTPATIENT
Start: 2021-05-10 | End: 2021-09-08

## 2021-05-10 RX ORDER — LIDOCAINE HYDROCHLORIDE 20 MG/ML
INJECTION, SOLUTION INFILTRATION; PERINEURAL PRN
Status: DISCONTINUED | OUTPATIENT
Start: 2021-05-10 | End: 2021-05-10 | Stop reason: SDUPTHER

## 2021-05-10 RX ORDER — MORPHINE SULFATE 4 MG/ML
1 INJECTION, SOLUTION INTRAMUSCULAR; INTRAVENOUS EVERY 5 MIN PRN
Status: DISCONTINUED | OUTPATIENT
Start: 2021-05-10 | End: 2021-05-10 | Stop reason: HOSPADM

## 2021-05-10 RX ORDER — LIDOCAINE HYDROCHLORIDE 10 MG/ML
1 INJECTION, SOLUTION EPIDURAL; INFILTRATION; INTRACAUDAL; PERINEURAL
Status: DISCONTINUED | OUTPATIENT
Start: 2021-05-10 | End: 2021-05-10 | Stop reason: HOSPADM

## 2021-05-10 RX ORDER — LABETALOL HYDROCHLORIDE 5 MG/ML
5 INJECTION, SOLUTION INTRAVENOUS EVERY 10 MIN PRN
Status: DISCONTINUED | OUTPATIENT
Start: 2021-05-10 | End: 2021-05-10 | Stop reason: HOSPADM

## 2021-05-10 RX ORDER — SODIUM CHLORIDE 0.9 % (FLUSH) 0.9 %
5-40 SYRINGE (ML) INJECTION EVERY 12 HOURS SCHEDULED
Status: DISCONTINUED | OUTPATIENT
Start: 2021-05-10 | End: 2021-05-10 | Stop reason: HOSPADM

## 2021-05-10 RX ORDER — SODIUM CHLORIDE 9 MG/ML
25 INJECTION, SOLUTION INTRAVENOUS PRN
Status: DISCONTINUED | OUTPATIENT
Start: 2021-05-10 | End: 2021-05-10 | Stop reason: HOSPADM

## 2021-05-10 RX ORDER — OXYCODONE HYDROCHLORIDE 5 MG/1
5 TABLET ORAL PRN
Status: DISCONTINUED | OUTPATIENT
Start: 2021-05-10 | End: 2021-05-10 | Stop reason: HOSPADM

## 2021-05-10 RX ADMIN — PROPOFOL 50 MG: 10 INJECTION, EMULSION INTRAVENOUS at 11:46

## 2021-05-10 RX ADMIN — LIDOCAINE HYDROCHLORIDE 140 MG: 20 INJECTION, SOLUTION INFILTRATION; PERINEURAL at 11:40

## 2021-05-10 RX ADMIN — PROPOFOL 50 MG: 10 INJECTION, EMULSION INTRAVENOUS at 11:40

## 2021-05-10 RX ADMIN — PROPOFOL 50 MG: 10 INJECTION, EMULSION INTRAVENOUS at 11:41

## 2021-05-10 RX ADMIN — SODIUM CHLORIDE, POTASSIUM CHLORIDE, SODIUM LACTATE AND CALCIUM CHLORIDE: 600; 310; 30; 20 INJECTION, SOLUTION INTRAVENOUS at 11:03

## 2021-05-10 RX ADMIN — PROPOFOL 20 MG: 10 INJECTION, EMULSION INTRAVENOUS at 11:47

## 2021-05-10 RX ADMIN — GLYCOPYRROLATE 0.2 MG: 0.2 INJECTION, SOLUTION INTRAMUSCULAR; INTRAVENOUS at 11:33

## 2021-05-10 RX ADMIN — PROPOFOL 20 MG: 10 INJECTION, EMULSION INTRAVENOUS at 11:43

## 2021-05-10 RX ADMIN — PROPOFOL 50 MG: 10 INJECTION, EMULSION INTRAVENOUS at 11:44

## 2021-05-10 ASSESSMENT — PULMONARY FUNCTION TESTS
PIF_VALUE: 1

## 2021-05-10 ASSESSMENT — PAIN - FUNCTIONAL ASSESSMENT: PAIN_FUNCTIONAL_ASSESSMENT: FACES

## 2021-05-10 NOTE — H&P
Update History & Physical    The patient's History and Physical of April 28, 2021 was reviewed with the patient and I examined the patient. There have been no changes. She denies reflux or taking medications for GERD. The surgical site and procedure were confirmed by the patient and me. Plan: The risks, benefits, expected outcome, and alternative to the recommended procedure have been discussed with the patient. Patient understands and wants to proceed with the procedure.      Electronically signed by Mariola Marcus DO on 5/10/2021 at 7:16 AM

## 2021-05-10 NOTE — LETTER
[facility]  [address] [address]  Phone: [phone]          May 10, 2021     Patient: Jacque Ceballos   YOB: 1990   Date of Visit: 5/10/2021       To Whom it May Concern:    Main Perez was seen in the Out Patient/Same Day Surgery Department on 5/10/2021. She may return to work on 5/12/2021 due to driving restrictions. If you have any questions or concerns, please don't hesitate to call.     Sincerely,     Dr. Puja Nava RN

## 2021-05-10 NOTE — OP NOTE
Esophagogastroduodenoscopy with biopsy    Indications: Pre-op gastric Surgery, rule out Gastroesophageal reflux disease. Pre-operative Diagnosis: Obesity/pre-op bariatric surgery . Post-operative Diagnosis: Superficial Gastritis    Surgeon: Bindu Galloway    Anesthesia: MAC      Procedure Details   The patient was seen in the Holding Room. The risks, benefits, complications, treatment options, and expected outcomes were discussed with the patient. Pre-op Endoscopy recommended to rule any intragastric pathology that would interfere with proposed procedure /  And or due to current conditions. The possibilities of reaction to medication, pulmonary aspiration, perforation of viscus, bleeding, recurrent infection, the need for additional procedures, failure to diagnose a condition, and creating a complication requiring transfusion or operation were discussed with the patient. The patient concurred with the proposed plan, giving informed consent. The site of surgery properly noted/marked. The patient was taken to Endoscopy Suite, identified, and the procedure verified as  Esophagogastroduodenoscopy  A Time Out was held and the above information confirmed. Upper Endoscopy: An endoscope was inserted through the oropharynx into the upper esophagus. The endoscope was passed into the stomach to the level of the pylorus and passed to the duodenum where the ampulla was visualized and duodenum was normal. Then the scope was retracted back to the stomach and it was normal except for gastritis, biopsy of the antrum obtained for H. Pylori, then retroflexed and the gastroesophageal junction was inspected.  There was no hiatal hernia and no evidence of Mcconnell's     Findings:  Esophageal findings include:  Upper: normal Esophageal Mucosa  Lower:normal Esophageal Mucosa  Distal: normal Esophageal Mucosa      Gastric findings include: abnormal Gastric Mucosa    Duodenal Findings: normal Duodenal Mucosa        Estimated Blood Loss:  none    Biopsy:  Antrum    Complications:  None; patient tolerated the procedure well. Disposition: PACU - hemodynamically stable. Condition: stable    Attending Attestation: I was present and scrubbed for the entire procedure.

## 2021-05-12 NOTE — PROGRESS NOTES
Hunter Leaks         : 1990  [] [address]     [] [address]      [] [address]     [][facility]    [] Diannia Needle  [] [facility]   [] Other:  Diagnosis: [x] FARA (G47.33) [] CSA (G47.31) [] Apnea (G47.30)   Length of Need: [] 12 Months [x] 99 Months [] Other:    Machine (STEPHANY!): [x] Respironics Dream Station      Auto [x] ResMed AirSense     Auto [] Other:     [x]  CPAP () [] Bilevel ()   Mode: [x] Auto [] Spontaneous    Mode: [] Auto [] Spontaneous           Between 5 and 15 cm                 Comfort Settings:   - Ramp Pressure: 5 cmH2O                                        - Ramp time: 15 min                                     -  Flex/EPR - 3 full time                                    - For ResMed Bilevel (TiMax-4 sec   TiMin- 0.2 sec)     Humidifier: [x] Heated ()        [x] Water chamber replacement ()/ 1 per 6 months        Mask:  Please always start with the mask the patient used during the titraion   [x] Nasal () /1 per 3 months [x] Full Face () /1 per 3 months   [x] Patient choice -Size and fit mask [x] Patient Choice - Size and fit mask   [] Dispense:  [] Dispense:    [x] Headgear () / 1 per 3 months [x] Headgear () / 1 per 3 months   [x] Replacement Nasal Cushion ()/2 per month [x] Interface Replacement ()/1 per month   [x] Replacement Nasal Pillows ()/2 per month         Tubing: [x] Heated ()/1 per 3 months    [] Standard ()/1 per 3 months [] Other:           Filters: [x] Non-disposable ()/1 per 6 months     [x] Ultra-Fine, Disposable ()/2 per month        Miscellaneous: [x] Chin Strap ()/ 1 per 6 months [] O2 bleed-in:       LPM   [] Oximetry on CPAP/Bilevel []  Other:          Start Order Date: 21    MEDICAL JUSTIFICATION:  I, the undersigned, certify that the above prescribed supplies are medically necessary for this patients wellbeing.   In my opinion, the supplies are both reasonable and necessary in reference to accepted standards of medicalpractice in treatment of this patients condition.     Jacques Echevarria MD      NPI: [id number]       Order Signed Date: 05/12/21    Electronically signed by Jacques Echevarria MD on 5/12/2021 at 8:14 AM

## 2021-05-13 ENCOUNTER — TELEPHONE (OUTPATIENT)
Dept: SLEEP MEDICINE | Age: 31
End: 2021-05-13

## 2021-05-13 NOTE — TELEPHONE ENCOUNTER
LVM for patient to  for HSAT results. [facility] Sleep study showed moderate FARA. AHI was 15.3  per hr. And O2 Desaturations to 83% with time below 88% of 2.1 min. APAP between 5 and 15cm is recommended. No DME on file.

## 2021-05-17 NOTE — TELEPHONE ENCOUNTER
Zee Sanchez calls in. She will go with [employer] [facility] [employer] [facility] as her DME.   Please send orders, etc to [employer] [facility],

## 2021-05-26 ENCOUNTER — OFFICE VISIT (OUTPATIENT)
Dept: BARIATRICS/WEIGHT MGMT | Age: 31
End: 2021-05-26
Payer: COMMERCIAL

## 2021-05-26 VITALS — BODY MASS INDEX: 41.95 KG/M2 | WEIGHT: 293 LBS | HEIGHT: 70 IN

## 2021-05-26 DIAGNOSIS — E66.01 MORBID OBESITY WITH BMI OF 50.0-59.9, ADULT (HCC): Primary | ICD-10-CM

## 2021-05-26 DIAGNOSIS — G47.33 OBSTRUCTIVE SLEEP APNEA: ICD-10-CM

## 2021-05-26 PROCEDURE — 99213 OFFICE O/P EST LOW 20 MIN: CPT | Performed by: SURGERY

## 2021-05-26 NOTE — PROGRESS NOTES
Bravo Anguianow gained 4.2 lbs over 1 month. Pt has eliminated soft drinks and increased physical activity. Is pt eating at least 4 times everyday? 3-4x   B - 8:30a SF maple brown sugar oatmeal / yogurt  S - noon- 11-12 grapes  L - 3p - turkey sandwich  D - sometimes - baked chix + corn on cob / hamburger + mac n cheese + green beans / occasionally fast food (part of 6\" Chix & cheese Sub + bag baked lays)    Is pt eating a lean protein source with all meals and snacks? no    Has pt decreased their portions using the plate method? Could improve    Is pt choosing low fat/sugar free options? Trying to avoid    Is pt drinking at least 64 oz of clear liquids everyday? Yes - water / Sf flavoring    Has pt stopped drinking carbonation, caffeinated, and sugar sweetened beverages? No    Has pt sampled Unjury and/or Nectar protein?  No, reviewed and encouraged    Participating in intentional exercise? gym 2x week for 60min - doing treadmill & eliptical    Plan/Recommendations:   - Consistently eat 4x day to include lean protein with each meal / snack  - Limit fruit to 1/2 cup once or twice daily  - Food journal / track intake 3-4x week  - Try protein powder  - Complete Support Group  - Continue with gym    Handouts: Virtual [facility] Schedule, emailed [facility] in [address], [place], [place]

## 2021-05-26 NOTE — PATIENT INSTRUCTIONS
Patient received dietary handouts and education.   - Consistently eat 4x day to include lean protein with each meal / snack  - Limit fruit to 1/2 cup once or twice daily  - Food journal / track intake 3-4x week  - Try protein powder  - Complete Support Group  - Continue with gym    Handouts: Virtual [facility] Schedule, emailed [facility] in [facility]

## 2021-05-31 NOTE — PROGRESS NOTES
[facility]) Physicians   General & Laparoscopic Surgery  [employer] Management [employer]       HPI:     Sushant Bishop is a very pleasant 33 y.o. female with Body mass index is 54.87 kg/m². , Pre-Surgery. Pre-operative clearance and work up pending. Working hard to keep good dietary habits as well level of activity. Patient denies any nausea, vomiting, fevers, chills, shortness of breath, chest pain, cough, constipation or difficulty urinating. Past Medical History:   Diagnosis Date    Asthma     controlled    Generalized anxiety disorder     Moderate episode of recurrent major depressive disorder ([facility] [address] 7/19/2018    Morbid obesity with BMI of 50.0-59.9, adult ([facility])     Pre-operative clearance     Unspecified vitamin D deficiency 4/13/2011     Past Surgical History:   Procedure Laterality Date    DILATION AND CURETTAGE OF UTERUS  4/19/2013    UPPER GASTROINTESTINAL ENDOSCOPY N/A 5/10/2021    EGD BIOPSY performed by Cecy Guajardo DO at [address] History   Problem Relation Age of Onset    Hypertension Mother    Ramirez Migraines Mother     Cancer Father     Hypertension Father     Elevated Lipids Father     Diabetes Father     Heart Attack Paternal Grandmother     Hypertension Paternal Grandmother     Rheum Arthritis Neg Hx     Osteoarthritis Neg Hx     Asthma Neg Hx     Breast Cancer Neg Hx     Heart Failure Neg Hx     High Cholesterol Neg Hx     Ovarian Cancer Neg Hx     Rashes/Skin Problems Neg Hx     Seizures Neg Hx     Stroke Neg Hx     Thyroid Disease Neg Hx      Social History     Tobacco Use    Smoking status: Never Smoker    Smokeless tobacco: Never Used   Substance Use Topics    Alcohol use: No     Comment: rare     I counseled the patient on the importance of not smoking and risks of ETOH. No Known Allergies  Vitals:    05/26/21 1438   Weight: (!) 377 lb (171 kg)   Height: 5' 9.5\" (1.765 m)       Body mass index is 54.87 kg/m².       Current Outpatient Medications:     hydrOXYzine (VISTARIL) 25 MG capsule, Take 25 mg by mouth 3 times daily as needed for Itching, Disp: , Rfl:     omeprazole (PRILOSEC) 20 MG delayed release capsule, Take 1 capsule by mouth every morning (before breakfast), Disp: 30 capsule, Rfl: 1    etonogestrel-ethinyl estradiol (ELURYNG) 0.12-0.015 MG/24HR vaginal ring, Place 1 each vaginally See Admin Instructions, Disp: 3 each, Rfl: 1    albuterol sulfate HFA (PROVENTIL HFA) 108 (90 Base) MCG/ACT inhaler, Inhale 2 puffs into the lungs every 6 hours as needed for Wheezing or Shortness of Breath, Disp: 1 Inhaler, Rfl: 3      Review of Systems - History obtained from the patient  General ROS: negative  Psychological ROS: negative  Endocrine ROS: negative  Respiratory ROS: negative  Cardiovascular ROS: negative  Gastrointestinal ROS:negative  Genito-Urinary ROS: negative  Musculoskeletal ROS: negative   Skin ROS: negative    Physical Exam   Vitals Reviewed   Constitutional: Patient is oriented to person, place, and time. Patient appears well-developed and well-nourished. Patient is active and cooperative. Non-toxic appearance. No distress. Neck: Trachea normal and normal range of motion. No JVD present. Pulmonary/Chest: Effort normal. No accessory muscle usage or stridor. No apnea. No respiratory distress. Cardiovascular: Normal rate and no JVD. Abdominal: Normal appearance. Patient exhibits no distension. Abdomen is soft, obese, non tender. Musculoskeletal: Normal range of motion. Patient exhibits no edema. Neurological: Patient is alert and oriented to person, place, and time. Patient has normal strength. GCS eye subscore is 4. GCS verbal subscore is 5. GCS motor subscore is 6. Skin: Skin is warm and dry. No abrasion and no rash noted. Patient is not diaphoretic. No cyanosis or erythema. Psychiatric: Patient has a normal mood and affect.  Speech is normal and behavior is normal. Cognition and memory are normal.       A/P Joshua Kendall is 33 y.o. female, Body mass index is 54.87 kg/m². pre surgery, has gained 4# since last visit. The patient underwent dietary counseling with registered dietician. I have reviewed, discussed and agree with the dietary plan. Patient is trying hard to keep good dietary and behavior modifications. Patient is monitoring portion sizes, food choices and liquid calories. Patient is trying to exercise regularly as much as possible. We discussed how her weight affects her overall health including:  Josue Islam was seen today for weight management. Diagnoses and all orders for this visit:    Morbid obesity with BMI of 50.0-59.9, adult ([facility] [address]    Obstructive sleep apnea       and importance of weight loss to alleviate those co morbid conditions. I encouraged the patient to continue exercise and keeping healthy eating habits. Discussed pre-op labs and work up till now. Also counseled the patient extensively on Surgery. Total encounter time: 20 minutes including any number of the following: review of labs, imaging, provider notes, outside hospital records; performing examination/evaluation; counseling patient and family; ordering medications/tests; placing referrals and communication with referring physicians; coordination of care, and documentation in the EHR. RTC in 4 weeks  Obtain rest of pre-op work up / clearances  Diet and Exercise      Patient advised that its their responsibility to follow up for studies and/or labs ordered today.      Ebony Lewis

## 2021-06-15 ENCOUNTER — TELEPHONE (OUTPATIENT)
Dept: BARIATRICS/WEIGHT MGMT | Age: 31
End: 2021-06-15

## 2021-06-15 NOTE — TELEPHONE ENCOUNTER
[email or web address]      Patient was supposed to attend the support group this Saturday, however she has to work. She advised that someone stated they could send her the powerpoint for the support group and she wanted to follow up and state she has not received anything in her email. I advised I would have Emmie Friday follow up with her regarding the message since she is the one conducting the support group. Please call patient. Thank you!

## 2021-06-30 ENCOUNTER — OFFICE VISIT (OUTPATIENT)
Dept: BARIATRICS/WEIGHT MGMT | Age: 31
End: 2021-06-30
Payer: COMMERCIAL

## 2021-06-30 VITALS — WEIGHT: 293 LBS | BODY MASS INDEX: 41.95 KG/M2 | HEIGHT: 70 IN

## 2021-06-30 DIAGNOSIS — G47.33 OBSTRUCTIVE SLEEP APNEA: ICD-10-CM

## 2021-06-30 DIAGNOSIS — E66.01 MORBID OBESITY WITH BMI OF 50.0-59.9, ADULT (HCC): Primary | ICD-10-CM

## 2021-06-30 PROCEDURE — 99213 OFFICE O/P EST LOW 20 MIN: CPT | Performed by: SURGERY

## 2021-06-30 NOTE — PROGRESS NOTES
[facility]) Physicians   General & Laparoscopic Surgery  [employer]       HPI:     John Noel is a very pleasant 33 y.o. female with Body mass index is 54.29 kg/m². , Pre-Surgery. Pre-operative clearance and work up pending. Working hard to keep good dietary habits as well level of activity. Patient denies any nausea, vomiting, fevers, chills, shortness of breath, chest pain, cough, constipation or difficulty urinating. Past Medical History:   Diagnosis Date    Asthma     controlled    Generalized anxiety disorder     Moderate episode of recurrent major depressive disorder ([facility] [address] 7/19/2018    Morbid obesity with BMI of 50.0-59.9, adult ([facility])     Pre-operative clearance     Unspecified vitamin D deficiency 4/13/2011     Past Surgical History:   Procedure Laterality Date    DILATION AND CURETTAGE OF UTERUS  4/19/2013    UPPER GASTROINTESTINAL ENDOSCOPY N/A 5/10/2021    EGD BIOPSY performed by Natali Coats DO at [address] History   Problem Relation Age of Onset    Hypertension Mother    Bethena Lobstein Migraines Mother     Cancer Father     Hypertension Father     Elevated Lipids Father     Diabetes Father     Heart Attack Paternal Grandmother     Hypertension Paternal Grandmother     Rheum Arthritis Neg Hx     Osteoarthritis Neg Hx     Asthma Neg Hx     Breast Cancer Neg Hx     Heart Failure Neg Hx     High Cholesterol Neg Hx     Ovarian Cancer Neg Hx     Rashes/Skin Problems Neg Hx     Seizures Neg Hx     Stroke Neg Hx     Thyroid Disease Neg Hx      Social History     Tobacco Use    Smoking status: Never Smoker    Smokeless tobacco: Never Used   Substance Use Topics    Alcohol use: No     Comment: rare     I counseled the patient on the importance of not smoking and risks of ETOH. No Known Allergies  Vitals:    06/30/21 0712   Weight: (!) 373 lb (169.2 kg)   Height: 5' 9.5\" (1.765 m)       Body mass index is 54.29 kg/m².       Current Outpatient A/P    Francis Lai is 33 y.o. female, Body mass index is 54.29 kg/m². pre surgery, has lost 4# since last visit. The patient underwent dietary counseling with registered dietician. I have reviewed, discussed and agree with the dietary plan. Patient is trying hard to keep good dietary and behavior modifications. Patient is monitoring portion sizes, food choices and liquid calories. Patient is trying to exercise regularly as much as possible. We discussed how her weight affects her overall health including:  Areli Stark was seen today for obesity. Diagnoses and all orders for this visit:    Morbid obesity with BMI of 50.0-59.9, adult ([facility] [address]    Obstructive sleep apnea       and importance of weight loss to alleviate those co morbid conditions. I encouraged the patient to continue exercise and keeping healthy eating habits. Discussed pre-op labs and work up till now. Also counseled the patient extensively on Surgery. Total encounter time: 20 minutes including any number of the following: review of labs, imaging, provider notes, outside hospital records; performing examination/evaluation; counseling patient and family; ordering medications/tests; placing referrals and communication with referring physicians; coordination of care, and documentation in the EHR. RTC in 4 weeks  Obtain rest of pre-op work up / clearances  Diet and Exercise      Patient advised that its their responsibility to follow up for studies and/or labs ordered today.      Bindu Galloway

## 2021-06-30 NOTE — PROGRESS NOTES
Keyla Wright lost 4 lbs over the past month. Pt is pleased with weight loss; she is eating more frequently and has eliminated soda    Is pt eating at least 4 times everyday? yes 2 meals and 2 snacks    Is pt eating a lean protein source with all meals and snacks? yes protein shakes    Has pt decreased their portions using the plate method? yes especially pasta portions    Is pt choosing low fat/sugar free options? yes avoiding chips, fried foods    Is pt drinking at least 64 oz of clear liquids everyday? yes water, crystal lite    Has pt stopped drinking carbonation, caffeinated, and sugar sweetened beverages? yes has eliminated soda, caffeine    Has pt sampled Unjury and/or Nectar protein?  yes tried and tolerated    Participating in intentional exercise? none organized but more active and walking more    Plan/Recommendations: include protein with snacks    Handouts: none    Kat Grubbs, RD, LD

## 2021-07-22 ENCOUNTER — OFFICE VISIT (OUTPATIENT)
Dept: SLEEP MEDICINE | Age: 31
End: 2021-07-22
Payer: COMMERCIAL

## 2021-07-22 VITALS
OXYGEN SATURATION: 96 % | BODY MASS INDEX: 41.95 KG/M2 | HEART RATE: 122 BPM | TEMPERATURE: 97.8 F | RESPIRATION RATE: 18 BRPM | WEIGHT: 293 LBS | SYSTOLIC BLOOD PRESSURE: 138 MMHG | HEIGHT: 70 IN | DIASTOLIC BLOOD PRESSURE: 84 MMHG

## 2021-07-22 DIAGNOSIS — Z99.89 OSA ON CPAP: Primary | ICD-10-CM

## 2021-07-22 DIAGNOSIS — G47.33 OSA ON CPAP: Primary | ICD-10-CM

## 2021-07-22 DIAGNOSIS — Z99.89 DEPENDENCE ON OTHER ENABLING MACHINES AND DEVICES: ICD-10-CM

## 2021-07-22 DIAGNOSIS — E66.01 MORBID OBESITY WITH BMI OF 50.0-59.9, ADULT (HCC): ICD-10-CM

## 2021-07-22 PROCEDURE — 99214 OFFICE O/P EST MOD 30 MIN: CPT | Performed by: PSYCHIATRY & NEUROLOGY

## 2021-07-22 ASSESSMENT — SLEEP AND FATIGUE QUESTIONNAIRES
HOW LIKELY ARE YOU TO NOD OFF OR FALL ASLEEP WHILE SITTING AND TALKING TO SOMEONE: 0
HOW LIKELY ARE YOU TO NOD OFF OR FALL ASLEEP WHEN YOU ARE A PASSENGER IN A CAR FOR AN HOUR WITHOUT A BREAK: 0
HOW LIKELY ARE YOU TO NOD OFF OR FALL ASLEEP IN A CAR, WHILE STOPPED FOR A FEW MINUTES IN TRAFFIC: 0
HOW LIKELY ARE YOU TO NOD OFF OR FALL ASLEEP WHILE SITTING QUIETLY AFTER LUNCH WITHOUT ALCOHOL: 1
HOW LIKELY ARE YOU TO NOD OFF OR FALL ASLEEP WHILE WATCHING TV: 1
HOW LIKELY ARE YOU TO NOD OFF OR FALL ASLEEP WHILE LYING DOWN TO REST IN THE AFTERNOON WHEN CIRCUMSTANCES PERMIT: 0
HOW LIKELY ARE YOU TO NOD OFF OR FALL ASLEEP WHILE SITTING INACTIVE IN A PUBLIC PLACE: 1
ESS TOTAL SCORE: 3
HOW LIKELY ARE YOU TO NOD OFF OR FALL ASLEEP WHILE SITTING AND READING: 0

## 2021-07-22 ASSESSMENT — ENCOUNTER SYMPTOMS: APNEA: 0

## 2021-07-22 NOTE — PATIENT INSTRUCTIONS
Patient Education        Learning About CPAP for Sleep Apnea  What is CPAP? CPAP is a small machine that you use at home every night while you sleep. It increases air pressure in your throat to keep your airway open. When you have sleep apnea, this can help you sleep better so you feel much better. CPAP stands for \"continuous positive airway pressure. \"  The CPAP machine will have one of the following:  · A mask that covers your nose and mouth  · Prongs that fit into your nose  · A mask that covers your nose only, which is the most common type. This type is called NCPAP. The N stands for \"nasal.\"  Why is it done? CPAP is usually the best treatment for obstructive sleep apnea. It is the first treatment choice and the most widely used. CPAP:  · Helps you have more normal sleep, so you feel less sleepy and more alert during the daytime. · May help keep heart failure or other heart problems from getting worse. · May help lower your blood pressure. If you use CPAP, your bed partner may also sleep better. That's because you aren't snoring or restless. Your doctor may suggest CPAP if you have:  · Moderate to severe sleep apnea. · Sleep apnea and coronary artery disease (CAD). · Sleep apnea and heart failure. What are the side effects? Some people who use CPAP have:  · A dry or stuffy nose and a sore throat. · Irritated skin on the face. · Sore eyes. · Bloating. How can you care for yourself? If using CPAP is not comfortable, or if you have certain side effects, work with your doctor to fix them. Here are some things you can try:  · Be sure the mask or nasal prongs fit well. · See if your doctor can adjust the pressure of your CPAP. · If your nose is dry, try a humidifier. · If your nose is runny or stuffy, try decongestant medicine or a steroid nasal spray. Be safe with medicines. Read and follow all instructions on the label. Do not use the medicine longer than the label says.   If these things don't help, you might try a different type of machine. Some machines have air pressure that adjusts on its own. Others have air pressures that are different when you breathe in than when you breathe out. This may reduce discomfort caused by too much pressure in your nose. Where can you learn more? Go to https://chpepiceweb.[email or web address]. org and sign in to your [employer] account. Enter Q018 in the TrialPay box to learn more about \"Learning About CPAP for Sleep Apnea. \"     If you do not have an account, please click on the \"Sign Up Now\" link. Current as of: October 26, 2020               Content Version: 12.9  © 2006-2021 Healthwise, Incorporated. Care instructions adapted under license by [facility] ([facility]). If you have questions about a medical condition or this instruction, always ask your healthcare professional. [address] any warranty or liability for your use of this information.

## 2021-07-22 NOTE — PROGRESS NOTES
Kiersten Castillo         : 1990        PHONE: [phone] (home)   [] [facility]     [] [address]      [] [place]     [][facility]    [x] Roena Enmanuel  [] [facility]     [] Other:    Diagnosis: [x] FARA (G47.33) [] CSA (G47.31) [] Apnea (G47.30)   Length of Need: [] 12 Months [x] 99 Months [] Other:    Machine (STEPHANY!): [] Respironics Dream Station      Auto [] ResMed AirSense     Auto [] Other:     []  CPAP () [] Bilevel ()   Mode: [] Auto [] Spontaneous    Mode: [] Auto [] Spontaneous           Please fix the machine since stopped recording after 30 days. Comfort Settings:   - Ramp Pressure: 5 cmH2O                                        - Ramp time: 15 min                                     -  Flex/EPR - 3 full time                                    - For ResMed Bilevel (TiMax-4 sec   TiMin- 0.2 sec)     Humidifier: [] Heated ()        [x] Water chamber replacement ()/ 1 per 6 months        Mask:   [] Nasal () /1 per 3 months [x] Full Face () /1 per 3 months   [] Patient choice -Size and fit mask [x] Patient Choice - Size and fit mask   [] Dispense:  [] Dispense: try F30 airfit   [] Headgear () / 1 per 3 months [x] Headgear () / 1 per 3 months   [] Replacement Nasal Cushion ()/2 per month [x] Interface Replacement ()/1 per month   [] Replacement Nasal Pillows ()/2 per month         Tubing: [x] Heated ()/1 per 3 months    [] Standard ()/1 per 3 months [] Other:           Filters: [x] Non-disposable ()/1 per 6 months     [x] Ultra-Fine, Disposable ()/2 per month        Miscellaneous: [] Chin Strap ()/ 1 per 6 months [] O2 bleed-in:       LPM   [] Oximetry on CPAP/Bilevel []  Other:          Start Order Date: 21    MEDICAL JUSTIFICATION:  I, the undersigned, certify that the above prescribed supplies are medically necessary for this patients wellbeing.   In my opinion, the supplies are both reasonable and necessary in reference to accepted standards of medicalpractice in treatment of this patients condition.     Carla Ramirez MD      NPI: [id number]       Order Signed Date: 07/22/21    Electronically signed by Carla Ramirez MD on 7/22/2021 at 3:40 PM

## 2021-07-22 NOTE — PROGRESS NOTES
MD CRISTINA Dooley Board Certified in Sleep Medicine  Certified in [address] [place] [address] Certified in Neurology Sunnytyrel Mata [address] [address] [facility],  Allan Zhou [address]  [phone]   [address] [facility], [address]                      [address]  [address] [facility] [address] [phone]    Subjective:     Patient ID: Yasmine Nguyen is a 33 y.o. female. Chief Complaint   Patient presents with    Follow-up     1st cpap f/u        HPI:        Yasmine Nguyen is a 33 y.o. female was seen today as a follow for obstructive sleep apnea. The patient underwent home sleep testing on 05/10/2021, the overnight registration revealed moderate obstructive sleep apnea with apnea hypopnea index of 15.3/hr with lowest O2 saturation of 83%, patient spent about 2.1 minutes below 90%. Patient is using the PAP machine about 80% of the time, more than 4 hours a nightabout  83 %, in total average of 5:14 hours a night in 30 days up to 30/2021, then the machine quit recording. Currently on PAP at 9.6 cm (5-15), the AHI is only 0.8 events per hour at this pressure. Patient improved regarding daytime sleepiness and fatigue, wakes up refreshed in the morning. The Patient scored Total score: 3 on [place] Sleepiness Scale ( more than 10 is indicative of daytime sleepiness)   Patient has no problem with PAP pressure or mask. Wakes up in the morning with dry mouth, the setting of the heated humidifier at # auto.       DOT/CDL - N/A        Previous Report(s)Reviewed: historical medical records         Social History     Socioeconomic History    Marital status: Single     Spouse name: Not on file    Number of children: Not on file    Years of education: Not on file    Highest education level: Not on file   Occupational History    Not on file   Tobacco Use    Smoking status: Never Smoker    Smokeless tobacco: Never Used   Vaping Use    Vaping Use: Never used   Substance and Sexual Activity    Alcohol use: No     Comment: rare    Drug use: No    Sexual activity: Yes     Partners: Male   Other Topics Concern    Not on file   Social History Narrative    Not on file     Social Determinants of Health     Financial Resource Strain:     Difficulty of Paying Living Expenses:    Food Insecurity:     Worried About Running Out of Food in the Last Year:     [address] in the Last Year:    Transportation Needs:     Lack of Transportation (Medical):  Lack of Transportation (Non-Medical):    Physical Activity:     Days of Exercise per Week:     Minutes of Exercise per Session:    Stress:     Feeling of Stress :    Social Connections:     Frequency of Communication with Friends and Family:     Frequency of Social Gatherings with Friends and Family:     Attends [employer]oration[employer] Services:     Active Member of Clubs or Organizations:     Attends Club or Organization Meetings:     Marital Status:    Intimate Partner Violence:     Fear of Current or Ex-Partner:     Emotionally Abused:     Physically Abused:     Sexually Abused:        Prior to Admission medications    Medication Sig Start Date End Date Taking?  Authorizing Provider   hydrOXYzine (VISTARIL) 25 MG capsule Take 25 mg by mouth 3 times daily as needed for Itching   Yes Historical Provider, MD   omeprazole (PRILOSEC) 20 MG delayed release capsule Take 1 capsule by mouth every morning (before breakfast) 5/10/21  Yes Yokasta Torres DO   etonogestrel-ethinyl estradiol Deeanna Angelucci) 0.12-0.015 MG/24HR vaginal ring Place 1 each vaginally See Admin Instructions 1/19/21  Yes Brenda Gonzalez MD   albuterol sulfate HFA (PROVENTIL HFA) 108 (90 Base) MCG/ACT inhaler Inhale 2 puffs into the lungs every 6 hours as needed for Wheezing or Shortness of Breath 6/91/00  Yes Shira Friend MD       Allergies as of 07/22/2021    (No Known Allergies)       Patient Active Problem List   Diagnosis    Headache    Edema    Obesity    Vitamin D deficiency    Molar pregnancy    Contusion of right knee    Wrist sprain    Insomnia    Snores    Other fatigue    Moderate episode of recurrent major depressive disorder (HCC)    Generalized anxiety disorder    Tinea corporis    Morbid obesity with BMI of 50.0-59.9, adult (HCC)    Gastritis    Obstructive sleep apnea       Past Medical History:   Diagnosis Date    Asthma     controlled    Generalized anxiety disorder     Moderate episode of recurrent major depressive disorder ([facility] [address] 7/19/2018    Morbid obesity with BMI of 50.0-59.9, adult ([facility] [address]     Pre-operative clearance     Unspecified vitamin D deficiency 4/13/2011       Past Surgical History:   Procedure Laterality Date    DILATION AND CURETTAGE OF UTERUS  4/19/2013    UPPER GASTROINTESTINAL ENDOSCOPY N/A 5/10/2021    EGD BIOPSY performed by Rinku Marin DO at [address] History   Problem Relation Age of Onset    Hypertension Mother    [employer] Migraines Mother     Cancer Father     Hypertension Father     Elevated Lipids Father     Diabetes Father     Heart Attack Paternal Grandmother     Hypertension Paternal Grandmother     Rheum Arthritis Neg Hx     Osteoarthritis Neg Hx     Asthma Neg Hx     Breast Cancer Neg Hx     Heart Failure Neg Hx     High Cholesterol Neg Hx     Ovarian Cancer Neg Hx     Rashes/Skin Problems Neg Hx     Seizures Neg Hx     Stroke Neg Hx     Thyroid Disease Neg Hx        Review of Systems   Constitutional: Negative for fatigue. Respiratory: Negative for apnea. Genitourinary: Negative for frequency. Neurological: Negative for headaches.        Objective:     Vitals:  Weight BMI Neck circumference    Wt Readings from Last 3 Encounters:   07/22/21 (!) 376 lb (170.6 kg)   06/30/21 (!) 373 lb (169.2 kg)   05/26/21 (!) 377 lb (171 kg)    Body mass index is 54.73 kg/m².       BP HR SaO2   BP Readings from Last 3 Encounters:   07/22/21 138/84   05/10/21 (!) 142/101   05/10/21 (!) 141/94    Pulse Readings from Last 3 Encounters:   07/22/21 122   05/10/21 70   04/28/21 98    SpO2 Readings from Last 3 Encounters:   07/22/21 96%   05/10/21 100%   05/10/21 100%        Themandibular molar Class :   [x]1 []2 []3      Mallampati I Airway Classification:   []1 []2 []3 [x]4      Physical Exam  Vitals and nursing note reviewed. Constitutional:       Appearance: She is obese. HENT:      Head: Atraumatic. Nose: Nose normal.      Mouth/Throat:      Mouth: Mucous membranes are moist.   Eyes:      Extraocular Movements: Extraocular movements intact. Cardiovascular:      Rate and Rhythm: Normal rate and regular rhythm. Heart sounds: Normal heart sounds. Pulmonary:      Effort: Pulmonary effort is normal.      Breath sounds: Normal breath sounds. Musculoskeletal:         General: Normal range of motion. Cervical back: Normal range of motion and neck supple. Skin:     General: Skin is warm. Neurological:      General: No focal deficit present. Psychiatric:         Mood and Affect: Mood normal.         :   Moderate Obstructive Sleep Apnea/Hypopnea Syndrome under good control on PAP at 9.6 cmwp. Diagnosis Orders   1. FARA on CPAP     2. Dependence on other enabling machines and devices     3. Morbid obesity with BMI of 50.0-59.9, adult [facility])       Plan: Will continue the PAP at 5-15 cmwp. She will talk to Carli Her to fix the recording on the APAP machine  I will order PAP supplies, mask, filters. ...  mask fitting with FFM F30 airfit. Most likely the patient will benefit from the gastric sleeve surgery in treating of the obstructive sleep apnea.    In 2013, in a study published in Obesity Surgery Journal  A total of 69 studies with 13,900 patients were included and revealed that all the procedures achieved profound effects on FARA, as over 75 % of patients saw at least an improvement in their sleep apnea, bariatric surgery is a definitive treatment for obstructive sleep apnea, regardless of the specific type of surgery. We discussed the proportionality between weight and AHI. With 10% weight change, the AHI has a 27% proportionate change. With 20% weight change, the AHI has a 45-50% proportionate change. The Patient is considered low risk for post operative pulmonary complications following sleeve gastrectomy from obstructive sleep apnea standpoint. No orders of the defined types were placed in this encounter. Return in about 4 weeks (around 8/19/2021) for Reveiwing CPAP usage and compliance report and tro.     Lian Baig MD  Medical Director [address] [place] [address]

## 2021-07-27 ENCOUNTER — TELEPHONE (OUTPATIENT)
Dept: PULMONOLOGY | Age: 31
End: 2021-07-27

## 2021-07-27 NOTE — TELEPHONE ENCOUNTER
Pt was here on 7-22 and forgot to get her work note  Fax [phone]    Letter on your desk for signature

## 2021-07-28 ENCOUNTER — OFFICE VISIT (OUTPATIENT)
Dept: BARIATRICS/WEIGHT MGMT | Age: 31
End: 2021-07-28
Payer: COMMERCIAL

## 2021-07-28 VITALS — HEIGHT: 70 IN | BODY MASS INDEX: 41.95 KG/M2 | WEIGHT: 293 LBS

## 2021-07-28 DIAGNOSIS — G47.33 OBSTRUCTIVE SLEEP APNEA: ICD-10-CM

## 2021-07-28 DIAGNOSIS — E66.01 MORBID OBESITY WITH BMI OF 50.0-59.9, ADULT (HCC): Primary | ICD-10-CM

## 2021-07-28 PROCEDURE — 99213 OFFICE O/P EST LOW 20 MIN: CPT | Performed by: SURGERY

## 2021-07-28 NOTE — PROGRESS NOTES
[facility]) Physicians   General & Laparoscopic Surgery  Weight Management Solutions       HPI:     Snow Calvillo is a very pleasant 33 y.o. female with Body mass index is 54.29 kg/m². , Pre-Surgery. Pre-operative clearance and work up pending. Working hard to keep good dietary habits as well level of activity. Patient denies any nausea, vomiting, fevers, chills, shortness of breath, chest pain, cough, constipation or difficulty urinating. Past Medical History:   Diagnosis Date    Asthma     controlled    Generalized anxiety disorder     Moderate episode of recurrent major depressive disorder ([facility] [address] 7/19/2018    Morbid obesity with BMI of 50.0-59.9, adult ([facility])     Pre-operative clearance     Unspecified vitamin D deficiency 4/13/2011     Past Surgical History:   Procedure Laterality Date    DILATION AND CURETTAGE OF UTERUS  4/19/2013    UPPER GASTROINTESTINAL ENDOSCOPY N/A 5/10/2021    EGD BIOPSY performed by Rinku Marin DO at [address] History   Problem Relation Age of Onset    Hypertension Mother    Evelin Me Migraines Mother     Cancer Father     Hypertension Father     Elevated Lipids Father     Diabetes Father     Heart Attack Paternal Grandmother     Hypertension Paternal Grandmother     Rheum Arthritis Neg Hx     Osteoarthritis Neg Hx     Asthma Neg Hx     Breast Cancer Neg Hx     Heart Failure Neg Hx     High Cholesterol Neg Hx     Ovarian Cancer Neg Hx     Rashes/Skin Problems Neg Hx     Seizures Neg Hx     Stroke Neg Hx     Thyroid Disease Neg Hx      Social History     Tobacco Use    Smoking status: Never Smoker    Smokeless tobacco: Never Used   Substance Use Topics    Alcohol use: No     Comment: rare     I counseled the patient on the importance of not smoking and risks of ETOH. No Known Allergies  Vitals:    07/28/21 0708   Weight: (!) 373 lb (169.2 kg)   Height: 5' 9.5\" (1.765 m)       Body mass index is 54.29 kg/m².       Current Outpatient Medications:     hydrOXYzine (VISTARIL) 25 MG capsule, Take 25 mg by mouth 3 times daily as needed for Itching, Disp: , Rfl:     omeprazole (PRILOSEC) 20 MG delayed release capsule, Take 1 capsule by mouth every morning (before breakfast), Disp: 30 capsule, Rfl: 1    etonogestrel-ethinyl estradiol (ELURYNG) 0.12-0.015 MG/24HR vaginal ring, Place 1 each vaginally See Admin Instructions, Disp: 3 each, Rfl: 1    albuterol sulfate HFA (PROVENTIL HFA) 108 (90 Base) MCG/ACT inhaler, Inhale 2 puffs into the lungs every 6 hours as needed for Wheezing or Shortness of Breath, Disp: 1 Inhaler, Rfl: 3      Review of Systems - History obtained from the patient  General ROS: negative  Psychological ROS: negative  Endocrine ROS: negative  Respiratory ROS: negative  Cardiovascular ROS: negative  Gastrointestinal ROS:negative  Genito-Urinary ROS: negative  Musculoskeletal ROS: negative   Skin ROS: negative    Physical Exam   Vitals Reviewed   Constitutional: Patient is oriented to person, place, and time. Patient appears well-developed and well-nourished. Patient is active and cooperative. Non-toxic appearance. No distress. Neck: Trachea normal and normal range of motion. No JVD present. Pulmonary/Chest: Effort normal. No accessory muscle usage or stridor. No apnea. No respiratory distress. Cardiovascular: Normal rate and no JVD. Abdominal: Normal appearance. Patient exhibits no distension. Abdomen is soft, obese, non tender. Musculoskeletal: Normal range of motion. Patient exhibits no edema. Neurological: Patient is alert and oriented to person, place, and time. Patient has normal strength. GCS eye subscore is 4. GCS verbal subscore is 5. GCS motor subscore is 6. Skin: Skin is warm and dry. No abrasion and no rash noted. Patient is not diaphoretic. No cyanosis or erythema. Psychiatric: Patient has a normal mood and affect.  Speech is normal and behavior is normal. Cognition and memory are normal. A/P    Christofer Ryan is 33 y.o. female, Body mass index is 54.29 kg/m². pre surgery, has stayed weight stable since last visit. The patient underwent dietary counseling with registered dietician. I have reviewed, discussed and agree with the dietary plan. Patient is trying hard to keep good dietary and behavior modifications. Patient is monitoring portion sizes, food choices and liquid calories. Patient is trying to exercise regularly as much as possible. We discussed how her weight affects her overall health including:  Faviola Varghese was seen today for obesity. Diagnoses and all orders for this visit:    Morbid obesity with BMI of 50.0-59.9, adult ([facility] [address]    Obstructive sleep apnea       and importance of weight loss to alleviate those co morbid conditions. I encouraged the patient to continue exercise and keeping healthy eating habits. Discussed pre-op labs and work up till now. Also counseled the patient extensively on Surgery. Total encounter time: 20 minutes including any number of the following: review of labs, imaging, provider notes, outside hospital records; performing examination/evaluation; counseling patient and family; ordering medications/tests; placing referrals and communication with referring physicians; coordination of care, and documentation in the EHR. RTC in 4 weeks  Obtain rest of pre-op work up / clearances  Diet and Exercise      Patient advised that its their responsibility to follow up for studies and/or labs ordered today.      Makayla Chao

## 2021-07-28 NOTE — PROGRESS NOTES
Nadja Guardado stable / unchanged. Is pt eating at least 4 times everyday? yes , 5x/day. 3 meals and 2 snacks    Is pt eating a lean protein source with all meals and snacks? yes , she is    Has pt decreased their portions using the plate method? yes , using smaller plate and smaller bowls to portion out pasta. Is pt choosing low fat/sugar free options? yes , she is    Is pt drinking at least 64 oz of clear liquids everyday? yes , she is    Has pt stopped drinking carbonation, caffeinated, and sugar sweetened beverages? yes , she has    Has pt sampled Unjury and/or Nectar protein? yes , she has    Participating in intentional exercise?  None consistent    Plan/Recommendations:   Walk 3x/week for 20-30 minutes    Handouts: None    Krystal Berry RD, LD

## 2021-08-04 ENCOUNTER — OFFICE VISIT (OUTPATIENT)
Dept: BARIATRICS/WEIGHT MGMT | Age: 31
End: 2021-08-04
Payer: COMMERCIAL

## 2021-08-04 VITALS
BODY MASS INDEX: 41.95 KG/M2 | WEIGHT: 293 LBS | HEART RATE: 99 BPM | TEMPERATURE: 97.8 F | HEIGHT: 70 IN | DIASTOLIC BLOOD PRESSURE: 88 MMHG | SYSTOLIC BLOOD PRESSURE: 151 MMHG

## 2021-08-04 DIAGNOSIS — K21.9 CHRONIC GERD: ICD-10-CM

## 2021-08-04 DIAGNOSIS — E66.01 MORBID OBESITY WITH BMI OF 50.0-59.9, ADULT (HCC): Primary | ICD-10-CM

## 2021-08-04 DIAGNOSIS — G47.33 OBSTRUCTIVE SLEEP APNEA: ICD-10-CM

## 2021-08-04 PROCEDURE — 99214 OFFICE O/P EST MOD 30 MIN: CPT | Performed by: SURGERY

## 2021-08-04 NOTE — PROGRESS NOTES
Hannah Cerna lost 4.2 lbs over 1 month. Pt focusing on portion control. Breakfast: Protein shake    Snack: yogurt + grapes / poptart    Lunch: most time - turkey sandwich + fruit + pretzels    Snack: yogurt + fruit    Dinner: baked chix + corn / pasta with cody sauce / occasionally 2 slices pizza     Is pt consuming smaller portions? Yes, trying to be mindful    Is pt consuming at least 64 oz of fluids per day? Yes - water / [place]    Is pt consuming carbonated, caffeinated, or sugary beverages? No, has eliminated    Has pt sampled Unjury and/or Nectar protein?  Yes - tried and tolerated    Exercise: walking 2x last week    Plan/Recommendations:   - Continue to eat 4-5x day focusing on protein and produce with meals / snacks  - Limit fruit to 1/2x portion 1-2x day  - Choose foods with 10 grams or less of sugar and 5 grams or less of fat - avoid pop-tarts ,cody sauce    Handouts: none    Reese Rizvi RD, LD

## 2021-08-12 ENCOUNTER — OFFICE VISIT (OUTPATIENT)
Dept: BARIATRICS/WEIGHT MGMT | Age: 31
End: 2021-08-12
Payer: COMMERCIAL

## 2021-08-12 VITALS — BODY MASS INDEX: 41.95 KG/M2 | WEIGHT: 293 LBS | HEIGHT: 70 IN

## 2021-08-12 DIAGNOSIS — K21.9 CHRONIC GERD: Primary | ICD-10-CM

## 2021-08-12 DIAGNOSIS — G47.33 OBSTRUCTIVE SLEEP APNEA: ICD-10-CM

## 2021-08-12 DIAGNOSIS — E66.01 MORBID OBESITY WITH BMI OF 50.0-59.9, ADULT (HCC): ICD-10-CM

## 2021-08-12 PROCEDURE — 99214 OFFICE O/P EST MOD 30 MIN: CPT | Performed by: NURSE PRACTITIONER

## 2021-08-12 ASSESSMENT — ENCOUNTER SYMPTOMS
COUGH: 0
RESPIRATORY NEGATIVE: 1
ALLERGIC/IMMUNOLOGIC NEGATIVE: 1
EYES NEGATIVE: 1
GASTROINTESTINAL NEGATIVE: 1
SHORTNESS OF BREATH: 0

## 2021-08-12 NOTE — PROGRESS NOTES
[facility]) Physicians   [employer] [facility] [employer]    Subjective:      Patient ID: Agnes Lewis is a 33 y.o. female    HPI    The patient is here for their bariatric surgery preoperative education group visit. She has made several attempts at weight loss in the past without success and now has been scheduled to have bariatric surgery on September 14, 2021 for future weight loss. She is working to change her dietary behaviors and lose weight to improve comorbid conditions such as obstructive sleep apnea and GERD. Agnes Lewis is a very pleasant 33 y.o. female with Body mass index is 53.86 kg/m². Past Medical History:   Diagnosis Date    Asthma     controlled    Generalized anxiety disorder     Moderate episode of recurrent major depressive disorder ([facility] [address] 7/19/2018    Morbid obesity with BMI of 50.0-59.9, adult ([facility])     Pre-operative clearance     Unspecified vitamin D deficiency 4/13/2011     Past Surgical History:   Procedure Laterality Date    DILATION AND CURETTAGE OF UTERUS  4/19/2013    UPPER GASTROINTESTINAL ENDOSCOPY N/A 5/10/2021    EGD BIOPSY performed by Giacomo Carter DO at [address] History   Problem Relation Age of Onset    Hypertension Mother    [facility] Mother     Cancer Father     Hypertension Father     Elevated Lipids Father     Diabetes Father     Heart Attack Paternal Grandmother     Hypertension Paternal Grandmother     Rheum Arthritis Neg Hx     Osteoarthritis Neg Hx     Asthma Neg Hx     Breast Cancer Neg Hx     Heart Failure Neg Hx     High Cholesterol Neg Hx     Ovarian Cancer Neg Hx     Rashes/Skin Problems Neg Hx     Seizures Neg Hx     Stroke Neg Hx     Thyroid Disease Neg Hx      Social History     Tobacco Use    Smoking status: Never Smoker    Smokeless tobacco: Never Used   Substance Use Topics    Alcohol use: No     Comment: rare     I counseled the patient on the importance of not smoking and risks of ETOH.    No Known Allergies  Vitals:    08/12/21 1316   Weight: (!) 370 lb (167.8 kg)   Height: 5' 9.5\" (1.765 m)     Body mass index is 53.86 kg/m².     Current Outpatient Medications:     hydrOXYzine (VISTARIL) 25 MG capsule, Take 25 mg by mouth 3 times daily as needed for Itching, Disp: , Rfl:     omeprazole (PRILOSEC) 20 MG delayed release capsule, Take 1 capsule by mouth every morning (before breakfast), Disp: 30 capsule, Rfl: 1    etonogestrel-ethinyl estradiol (ELURYNG) 0.12-0.015 MG/24HR vaginal ring, Place 1 each vaginally See Admin Instructions, Disp: 3 each, Rfl: 1    albuterol sulfate HFA (PROVENTIL HFA) 108 (90 Base) MCG/ACT inhaler, Inhale 2 puffs into the lungs every 6 hours as needed for Wheezing or Shortness of Breath, Disp: 1 Inhaler, Rfl: 3    Lab Results   Component Value Date    WBC 13.8 03/25/2021    RBC 5.07 03/25/2021    HGB 14.7 03/25/2021    HCT 43.9 03/25/2021    MCV 86.6 03/25/2021    MCH 29.0 03/25/2021    MCHC 33.5 03/25/2021    MPV 8.7 03/25/2021    NEUTOPHILPCT 53.0 03/25/2021    LYMPHOPCT 45.0 03/25/2021    MONOPCT 0.0 03/25/2021    EOSRELPCT 1.0 03/25/2021    BASOPCT 0.0 03/25/2021    NEUTROABS 7.5 03/25/2021    LYMPHSABS 6.2 03/25/2021    MONOSABS 0.0 03/25/2021    EOSABS 0.1 03/25/2021     Lab Results   Component Value Date     02/22/2021    K 4.1 02/22/2021     02/22/2021    CO2 23 02/22/2021    ANIONGAP 15 02/22/2021    GLUCOSE 96 02/22/2021    BUN 7 02/22/2021    CREATININE 0.6 02/22/2021    LABGLOM >60 02/22/2021    GFRAA >60 02/22/2021    GFRAA >60 04/19/2013    CALCIUM 10.1 02/22/2021    PROT 7.3 02/22/2021    PROT 6.7 04/12/2011    LABALBU 3.9 02/22/2021    AGRATIO 1.1 02/22/2021    BILITOT <0.2 02/22/2021    ALKPHOS 84 02/22/2021    ALT 19 02/22/2021    AST 15 02/22/2021    GLOB 3.4 02/22/2021     Lab Results   Component Value Date    CHOL 125 04/08/2021    TRIG 97 04/08/2021    HDL 60 04/08/2021    LDLCALC 46 04/08/2021    LABVLDL 19 04/08/2021     Lab Results   Component surgical candidate. Patient has a diagnosis of obstructive sleep apnea and uses a CPAP for sleep. Discussed that weight loss can assist with improving sleep apnea symptoms. Explained to patient to make sure they bring their CPAP with them to the hospital for their surgery. Patient has a diagnosis of chronic GERD and takes a PPI. Discussed the benefits of weight loss and dietary changes on acid reflux. However, they will most likely need to continue their medication short term after surgery as they adjust to the new diet. Discussed the fact that they will be required to crush or open their medications for the first two weeks after surgery and reviewed those medications that can not be crushed. Patient received instruction that it is recommended to avoid pregnancy following bariatric surgery for at least 2 years to allow them to have stable weight loss and to help avoid increased risk of vitamin deficiencies and malnutrition. The patient was encouraged to discuss possible contraceptive methods with their PCP or OBGYN. Patient received instructions from the registered dietitian in reference to the two week preoperative diet and the four phases of their postoperative diet. In addition I reviewed these instructions and stressed the importance of following these recommendations for their safety. Patient completed the preoperative class where they were provided with education related to their bariatric surgery, common surgical complications, medications preoperatively & postoperatively, special concerns related to bariatric surgery postoperatively, vitamin supplementation, patient agreement, PAT & scheduling, hospital course, wellness discovery program and what to do in the case of an emergency postoperatively. The dietitians reviewed all preoperative and postoperative diet instructions.     Patient was given the opportunity to ask questions during the group visit and these questions were answered by myself and/or the dietitian. I spent a total of 40 minutes on the day of the visit and over half of that time was spent counseling the patient on proper dietary behaviors, exercise and surgery protocols.

## 2021-08-18 NOTE — PROGRESS NOTES
[facility]) Physicians   General & Laparoscopic Surgery  [employer]       HPI:     Ashley Rose is a very pleasant 33 y.o. female with Body mass index is 53.64 kg/m². , Pre-Surgery. Pre-operative clearance and work up pending. Working hard to keep good dietary habits as well level of activity. Patient denies any nausea, vomiting, fevers, chills, shortness of breath, chest pain, cough, constipation or difficulty urinating. Past Medical History:   Diagnosis Date    Asthma     controlled    Generalized anxiety disorder     Moderate episode of recurrent major depressive disorder ([facility] [address] 7/19/2018    Morbid obesity with BMI of 50.0-59.9, adult ([facility])     Pre-operative clearance     Unspecified vitamin D deficiency 4/13/2011     Past Surgical History:   Procedure Laterality Date    DILATION AND CURETTAGE OF UTERUS  4/19/2013    UPPER GASTROINTESTINAL ENDOSCOPY N/A 5/10/2021    EGD BIOPSY performed by Shane Kumar DO at [address] History   Problem Relation Age of Onset    Hypertension Mother    [facility] Migraines Mother     Cancer Father     Hypertension Father     Elevated Lipids Father     Diabetes Father     Heart Attack Paternal Grandmother     Hypertension Paternal Grandmother     Rheum Arthritis Neg Hx     Osteoarthritis Neg Hx     Asthma Neg Hx     Breast Cancer Neg Hx     Heart Failure Neg Hx     High Cholesterol Neg Hx     Ovarian Cancer Neg Hx     Rashes/Skin Problems Neg Hx     Seizures Neg Hx     Stroke Neg Hx     Thyroid Disease Neg Hx      Social History     Tobacco Use    Smoking status: Never Smoker    Smokeless tobacco: Never Used   Substance Use Topics    Alcohol use: No     Comment: rare     I counseled the patient on the importance of not smoking and risks of ETOH.    No Known Allergies  Vitals:    08/04/21 1442   BP: (!) 151/88   Pulse: 99   Temp: 97.8 °F (36.6 °C)   Weight: (!) 368 lb 8 oz (167.2 kg)   Height: 5' 9.5\" (1.765 m)       Body mass index is 53.64 kg/m². Current Outpatient Medications:     hydrOXYzine (VISTARIL) 25 MG capsule, Take 25 mg by mouth 3 times daily as needed for Itching, Disp: , Rfl:     omeprazole (PRILOSEC) 20 MG delayed release capsule, Take 1 capsule by mouth every morning (before breakfast), Disp: 30 capsule, Rfl: 1    etonogestrel-ethinyl estradiol (ELURYNG) 0.12-0.015 MG/24HR vaginal ring, Place 1 each vaginally See Admin Instructions, Disp: 3 each, Rfl: 1    albuterol sulfate HFA (PROVENTIL HFA) 108 (90 Base) MCG/ACT inhaler, Inhale 2 puffs into the lungs every 6 hours as needed for Wheezing or Shortness of Breath, Disp: 1 Inhaler, Rfl: 3      Review of Systems - History obtained from the patient  General ROS: negative  Psychological ROS: negative  Endocrine ROS: negative  Respiratory ROS: negative  Cardiovascular ROS: negative  Gastrointestinal ROS:negative  Genito-Urinary ROS: negative  Musculoskeletal ROS: negative   Skin ROS: negative    Physical Exam   Vitals Reviewed   Constitutional: Patient is oriented to person, place, and time. Patient appears well-developed and well-nourished. Patient is active and cooperative. Non-toxic appearance. No distress. Neck: Trachea normal and normal range of motion. No JVD present. Pulmonary/Chest: Effort normal. No accessory muscle usage or stridor. No apnea. No respiratory distress. Cardiovascular: Normal rate and no JVD. Abdominal: Normal appearance. Patient exhibits no distension. Abdomen is soft, obese, non tender. Musculoskeletal: Normal range of motion. Patient exhibits no edema. Neurological: Patient is alert and oriented to person, place, and time. Patient has normal strength. GCS eye subscore is 4. GCS verbal subscore is 5. GCS motor subscore is 6. Skin: Skin is warm and dry. No abrasion and no rash noted. Patient is not diaphoretic. No cyanosis or erythema. Psychiatric: Patient has a normal mood and affect.  Speech is normal and behavior is normal. Cognition and memory are normal.       A/P    Guanaco Thapa is 33 y.o. female, Body mass index is 53.64 kg/m². pre surgery, has lost 4# since last visit. The patient underwent dietary counseling with registered dietician. I have reviewed, discussed and agree with the dietary plan. Patient is trying hard to keep good dietary and behavior modifications. Patient is monitoring portion sizes, food choices and liquid calories. Patient is trying to exercise regularly as much as possible. We discussed how her weight affects her overall health including:  Luis E Stuart was seen today for obesity. Diagnoses and all orders for this visit:    Morbid obesity with BMI of 50.0-59.9, adult ([address]    Obstructive sleep apnea    Chronic GERD       and importance of weight loss to alleviate those co morbid conditions. I encouraged the patient to continue exercise and keeping healthy eating habits. Discussed pre-op labs and work up till now. Also counseled the patient extensively on Surgery. Following The Metabolic and Bariatric Surgery Accreditation and Quality Improvement Program [facility]), and [employer] (ACS) recommendations, the Bariatric Surgical Risk/Benefit Calculator was used, and report was discussed with patient, who wishes to proceed with surgery, fully understanding the risks and benefits. Of note, The [facility] Bariatric Surgical Risk/Benefit Calculator estimates the chance of an unfavorable outcome (such as a complication or death), the chance of remission of weight-related comorbidities, and the patient's BMI, weight change, and percent total weight change after surgery. These quantities are estimated based upon information the patient gives the healthcare provider about prior health history. The estimates are calculated using data from a large number of patients who had a primary bariatric surgical procedure similar to the one the patient may have.   Please note the risk percentages, remission percentages, BMI, weight change, and percent total weight change provided to you by the risk/benefit calculator are only estimates. These estimates only take certain information into account. There may be other factors that are not included in the estimate which may increase or decrease the risk of a complication, the chance of remission of a weight-related comorbidity, or the amount of weight the patient loses. These estimates are not a guarantee of results. A complication after surgery may happen even if the risk is low, a weight-related comorbidity may not go into remission even if the chances are high, and a patient may lose more or less weight than predicted. This information is not intended to replace the advice of a doctor or healthcare provider about the diagnosis, treatment, or potential outcomes. The Provider is not responsible for medical decisions that may be made by the patient based on the risk/benefit calculator estimates, since these estimates are provided for informational purposes. Patients should always consult their doctor or other health care provider before deciding on a treatment plan. Both open and laparoscopic approach were explained in details. Benefits and risks explained. Informed consent obtained. Risks including but not limited to; Infection, bleeding, gastric leak or obstruction, persistent nausea, vomiting, or reflux, injury to surrounding structures, risks of anesthesia, stricture, delayed gastric emptying, staple line leak, incisional hernia, malnutrition , hair loss, and/ or Conversion to Open surgery may be necessary. Failure to lose or maintain weight loss, Gallstones or Kidney Stones, Deep Venous Thrombosis , pulmonary embolism and / or death.         I did explain thoroughly to the patient that compliance with pre- and post op diet and other recommendations are integral part to improve the chances of successful weight loss and also not following it could end medications, esophageal, splenic, lung, heart, bowel, vagus nerve or gastric injuries. However that will be determined intra-operatively, if not safe to proceed, then any additional procedures will have to be addressed later as primary goal is bariatric surgery.]     Patient understands that there may be a need to perform other urgent or necessary procedures that were unanticipated. Patient consents to the performance of any additional procedures determined during the original procedure to be in their best interests and where delay might cause additional harm or put patient at risk for additional anesthesia risk for required by a second procedure and that will be determined by the surgeon. Patient is aware if Weight gain occurs in pre-op period while on pre-operative diet or  non compliant with it , surgery will be canceled. Patient understands that in relation to the COVID-19 pandemic and surgical procedures, they have been given the opportunity to postpone   surgery until a  later date, and has chosen to proceed with surgery knowing the risks associated with COVID-19. Patient was satisfied with the discussion we had and had no further questions at end of visit and wants to proceed with surgery. 1- Pre-operative work up ordered for you(labs/x-rays/EKG). 2- Stop taking Blood thinners,one week before surgery. 3- F/U with PCP for pre-op Medical Clearance. 4- Plan for Robotic Sleeve, possible other indicated procedures. Patient advised that its their responsibility to follow up for studies, referrals and/or labs ordered today. Patient was counseled on risks of pregnancy within 2 year of bariatric surgery, understands, and accepts risk.         Laureen Castillo

## 2021-08-19 ENCOUNTER — TELEPHONE (OUTPATIENT)
Dept: BARIATRICS/WEIGHT MGMT | Age: 31
End: 2021-08-19

## 2021-09-08 ENCOUNTER — OFFICE VISIT (OUTPATIENT)
Dept: PRIMARY CARE CLINIC | Age: 31
End: 2021-09-08
Payer: COMMERCIAL

## 2021-09-08 VITALS
DIASTOLIC BLOOD PRESSURE: 80 MMHG | OXYGEN SATURATION: 97 % | WEIGHT: 293 LBS | TEMPERATURE: 97 F | HEIGHT: 70 IN | HEART RATE: 80 BPM | SYSTOLIC BLOOD PRESSURE: 115 MMHG | BODY MASS INDEX: 41.95 KG/M2

## 2021-09-08 DIAGNOSIS — Z23 NEEDS FLU SHOT: ICD-10-CM

## 2021-09-08 DIAGNOSIS — Z01.818 PREOP EXAMINATION: Primary | ICD-10-CM

## 2021-09-08 DIAGNOSIS — Z23 NEED FOR DIPHTHERIA-TETANUS-PERTUSSIS (TDAP) VACCINE: ICD-10-CM

## 2021-09-08 DIAGNOSIS — Z23 HIGH PRIORITY FOR COVID-19 VACCINATION: ICD-10-CM

## 2021-09-08 PROCEDURE — 99243 OFF/OP CNSLTJ NEW/EST LOW 30: CPT | Performed by: INTERNAL MEDICINE

## 2021-09-08 SDOH — ECONOMIC STABILITY: FOOD INSECURITY: WITHIN THE PAST 12 MONTHS, YOU WORRIED THAT YOUR FOOD WOULD RUN OUT BEFORE YOU GOT MONEY TO BUY MORE.: NEVER TRUE

## 2021-09-08 SDOH — ECONOMIC STABILITY: FOOD INSECURITY: WITHIN THE PAST 12 MONTHS, THE FOOD YOU BOUGHT JUST DIDN'T LAST AND YOU DIDN'T HAVE MONEY TO GET MORE.: NEVER TRUE

## 2021-09-08 ASSESSMENT — ENCOUNTER SYMPTOMS
EYES NEGATIVE: 1
CONSTIPATION: 0
ABDOMINAL DISTENTION: 0
SHORTNESS OF BREATH: 0
GASTROINTESTINAL NEGATIVE: 1
DIARRHEA: 0
CHEST TIGHTNESS: 0
COUGH: 0
WHEEZING: 0
BLOOD IN STOOL: 0
ABDOMINAL PAIN: 0

## 2021-09-08 ASSESSMENT — SOCIAL DETERMINANTS OF HEALTH (SDOH): HOW HARD IS IT FOR YOU TO PAY FOR THE VERY BASICS LIKE FOOD, HOUSING, MEDICAL CARE, AND HEATING?: NOT HARD AT ALL

## 2021-09-08 NOTE — PROGRESS NOTES
Subjective:      Patient ID: Ángel Peres is a 33 y.o. female. 9/8/2021 Patient presents with:  Pre-op Exam: gastric sleeve, surgery 9/14/21, Dr Minerva Segovia, surgery at [facility]    No  [facility] of anaesthesia problems or bleeding disorders     Has never had Anaesthesia . No bleeding issues             Last seen  7/30/2020   Dr Booth Speaker Patient presents with:  Established New Doctor    Ch anxiety . Getting worse . Was given Zoloft but never tried it     Wants to try Vistaril     Nurse by profession            Review of Systems   Constitutional: Negative for activity change, appetite change, fatigue, fever and unexpected weight change. Flu vac 10/19    Tdap     Pneum Vac 7/18    covid vac   HENT: Negative. Dental care reg    Eyes: Negative. Negative for visual disturbance. Wears glasses     Last exam 2021   Respiratory: Negative for cough, chest tightness, shortness of breath and wheezing. Does not smoke     No Etoh     asthma    Cardiovascular: Negative. Negative for chest pain. No HTN / CAD     FH of HTN but no CAD    Gastrointestinal: Negative. Negative for abdominal distention, abdominal pain, blood in stool, constipation and diarrhea. No FH of ca colon    Endocrine:        FH of Diabetes    Genitourinary: Negative for dysuria, menstrual problem, urgency and vaginal discharge. Periods reg     LMP   9/4/21    One child 8 . Nl gestation     On nuvaring     Last pelvic 2/20       Musculoskeletal: Negative. Allergic/Immunologic: Negative for environmental allergies and food allergies. Neurological: Negative for dizziness, weakness and headaches. Psychiatric/Behavioral: Positive for dysphoric mood (Better without any meds). Negative for behavioral problems and suicidal ideas. The patient is nervous/anxious (better). Objective:   Physical Exam  Vitals reviewed. Constitutional:       General: She is not in acute distress. Appearance: She is obese.    Eyes: Extraocular Movements: Extraocular movements intact. Conjunctiva/sclera: Conjunctivae normal.   Cardiovascular:      Rate and Rhythm: Normal rate and regular rhythm. Heart sounds: Normal heart sounds. Pulmonary:      Breath sounds: Normal breath sounds. Abdominal:      General: There is distension. Palpations: Abdomen is soft. Musculoskeletal:         General: Normal range of motion. Cervical back: Normal range of motion and neck supple. Skin:     General: Skin is warm. Neurological:      Mental Status: She is alert and oriented to person, place, and time. Psychiatric:         Mood and Affect: Mood normal.         Behavior: Behavior normal.         Judgment: Judgment normal.         Assessment:      Dillon Arnett was seen today for pre-op exam.    Diagnoses and all orders for this visit:    Preop examination  BMI> 53 . Cleared for Gastric Sleeve   -     CBC Auto Differential; Future  -     Comprehensive Metabolic Panel; Future  -     Pregnancy, Urine; Future  -     Urinalysis; Future  -     Covid-19 Ambulatory;  Future    High priority for COVID-19 vaccination  Info provided again       Need for diphtheria-tetanus-pertussis (Tdap) vaccine    Needs flu shot          Anxiety and depression never took Zoloft or  Vistaril       Plan:              Cande Butler MD

## 2021-09-09 ENCOUNTER — TELEPHONE (OUTPATIENT)
Dept: PRIMARY CARE CLINIC | Age: 31
End: 2021-09-09

## 2021-09-09 ENCOUNTER — TELEPHONE (OUTPATIENT)
Dept: BARIATRICS/WEIGHT MGMT | Age: 31
End: 2021-09-09

## 2021-09-09 DIAGNOSIS — Z01.818 PREOPERATIVE CLEARANCE: Primary | ICD-10-CM

## 2021-09-09 NOTE — TELEPHONE ENCOUNTER
----- Message from Tex Swan sent at 9/9/2021  3:24 PM EDT -----  Subject: Message to Provider    QUESTIONS  Information for Provider? Pt states that she was in for preop yesterday   and the PCP did not give her an EKG even though she did need one in order   to have this surgery on Wed. Pt is needing this done before 9/14/2021  ---------------------------------------------------------------------------  --------------  CALL BACK INFO  What is the best way for the office to contact you? OK to leave message on   voicemail  Preferred Call Back Phone Number? [phone]  ---------------------------------------------------------------------------  --------------  SCRIPT ANSWERS  Relationship to Patient?  Self

## 2021-09-09 NOTE — TELEPHONE ENCOUNTER
Patient called in today about the EKG issue. Stated the PCP said she did not need an EKG even though she gave her the letter and H&P form she was given. Patient now is being called by PAT about needed an EKG. She is upset because of being off work so much for appts. And now worried this will affect her surgery. Please call her asap. [phone]  PAT is telling her there is NO order in system.

## 2021-09-09 NOTE — TELEPHONE ENCOUNTER
Are you ok with pt' having a nurse visit tomorrow or Monday for an EKG or do you want this done at the hospital

## 2021-09-09 NOTE — PROGRESS NOTES
[address] [facility] patients having surgery or anesthesia are required to be Covid tested OR to have been vaccinated at least 14 days prior to your procedure. It is very important to return our call to [phone] and notify the staff of your last vaccination date otherwise you will be required to complete Covid PCR test within the 5-6 days prior to surgery & quarantine. The results will need to be faxed to PreAdmission Testing at [phone]. PRIOR TO PROCEDURE DATE:        1. PLEASE FOLLOW ANY  GUIDELINES/ INSTRUCTIONS PRIOR TO YOUR PROCEDURE AS ADVISED BY YOUR SURGEON. 2. Arrange for someone to drive you home and be with you for the first 24 hours after discharge for your safety after your procedure for which you received sedation. Ensure it is someone we can share information with regarding your discharge. 3. You must contact your surgeon for instructions IF:   You are taking any blood thinners, aspirin, anti-inflammatory or vitamin E.   There is a change in your physical condition such as a cold, fever, rash, cuts, sores or any other infection, especially near your surgical site. 4. Do not drink alcohol the day before or day of your procedure. 5. A Pre-op History and Physical for surgery MUST be completed by your Physician or Urgent Care within 30 days of your procedure date. Please bring a copy with you on the day of your procedure and along with any other testing performed. THE DAY OF YOUR PROCEDURE:  1. Follow instructions for ARRIVAL TIME as DIRECTED BY YOUR SURGEON. 2. Enter the MAIN entrance from [employer] and follow the signs to the free [employer] or [employer] parking (offered free of charge 6am-5pm). 3. Enter the Main Entrance of the hospital (do not enter from the lower level of the parking garage). Upon entrance, check in with the  at the main desk on your left. If no one is available at the desk, proceed into the [facility] Waiting Room and go through the door directly into the [facility]. There is a Check-in desk ACROSS from Room 5 (marked with a sign hanging from the ceiling). The phone number for the surgery center is [phone]. 4. Please call [phone] option #2 option #2 if you have not been preregistered yet. On the day of your procedure bring your insurance card and photo ID. You will be registered at your bedside once brought back to your room. 5. DO NOT EAT ANYTHING eight hours prior to your arrival for surgery. May have 8 ounces of water 4 hours prior to your arrival for surgery. NOTE: ALL Gastric, Bariatric and Bowel surgery patients MUST follow their surgeon's instructions. 6. MEDICATIONS    Take the following medications with a SMALL sip of water: none   Bariatric patient's call surgeon if on diabetic medications as some need to be stopped 1 week preop   Use your usual dose of inhalers the morning of surgery. BRING your rescue inhaler with you to hospital.    Anesthesia does NOT want you to take insulin the morning of surgery. They will control your blood sugar while you are at the hospital. Please contact your ordering physician for instructions regarding your insulin the night before your procedure. If you have an insulin pump, please keep it set on basal rate. 7. Do not swallow water when brushing teeth. No gum, candy, mints or ice chips. Refrain from smoking or at least decrease the amount. 8. Dress in loose, comfortable clothing appropriate for redressing after your procedure. Do not wear jewelry (including body piercings), make-up (especially NO eye make-up), fingernail polish (NO toenail polish if foot/leg surgery), lotion, powders or metal hairclips. 9. Dentures, glasses, or contacts will need to be removed before your procedure.  Bring cases for your glasses, contacts, dentures, or hearing aids to protect them by a healthcare worker using a special clippers designed to avoid skin irritation. 5. When you are in the operating room, your surgical site will be cleansed with a special soap, and in most cases, you will be given an antibiotic before the surgery begins. What to expect AFTER YOUR PROCEDURE:  1. Immediately following your procedure, your will be taken to the PACU for the first phase of your recovery. Your nurse will help you recover from any potential side effects of anesthesia, such as extreme drowsiness, changes in your vital signs or breathing patterns. Nausea, headache, muscle aches, or sore throat may also occur after anesthesia. Your nurse will help you manage these potential side effects. 2. For comfort and safety, arrange to have someone at home with you for the first 24 hours after discharge. 3. You and your family will be given written instructions about your diet, activity, dressing care, medications, and return visits. 4. Once at home, should issues with nausea, pain, or bleeding occur, or should you notice any signs of infection, you should call your surgeon. 5. Always clean your hands before and after caring for your wound. Do not let your family touch your surgery site without cleaning their hands. 6. Narcotic pain medications can cause significant constipation. You may want to add a stool softener to your postoperative medication schedule or speak to your surgeon on how best to manage this SIDE EFFECT. SPECIAL INSTRUCTIONS     Thank you for allowing us to care for you. We strive to exceed your expectations in the delivery of care and service provided to you and your family. If you need to contact the [place] [address] staff for any reason, please call us at [phone]    Instructions reviewed with patient during preadmission testing phone interview.   Floyd Weinstein RN.9/9/2021 .3:08 PM      ADDITIONAL EDUCATIONAL INFORMATION REVIEWED PER PHONE WITH YOU AND/OR YOUR FAMILY:  No Hibiclens® Bathing Instructions   Yes Antibacterial Soap

## 2021-09-10 ENCOUNTER — HOSPITAL ENCOUNTER (OUTPATIENT)
Age: 31
Discharge: HOME | End: 2021-09-10
Payer: COMMERCIAL

## 2021-09-10 PROCEDURE — 93005 ELECTROCARDIOGRAM TRACING: CPT | Performed by: SURGERY

## 2021-09-11 LAB
EKG ATRIAL RATE: 85 BPM
EKG DIAGNOSIS: NORMAL
EKG P AXIS: 57 DEGREES
EKG P-R INTERVAL: 178 MS
EKG Q-T INTERVAL: 374 MS
EKG QRS DURATION: 82 MS
EKG QTC CALCULATION (BAZETT): 445 MS
EKG R AXIS: 47 DEGREES
EKG T AXIS: 39 DEGREES
EKG VENTRICULAR RATE: 85 BPM

## 2021-09-11 PROCEDURE — 93010 ELECTROCARDIOGRAM REPORT: CPT | Performed by: INTERNAL MEDICINE

## 2021-09-13 ENCOUNTER — TELEPHONE (OUTPATIENT)
Dept: BARIATRICS/WEIGHT MGMT | Age: 31
End: 2021-09-13

## 2021-09-13 ENCOUNTER — ANESTHESIA EVENT (OUTPATIENT)
Dept: OPERATING ROOM | Age: 31
DRG: 621 | End: 2021-09-13
Payer: COMMERCIAL

## 2021-09-14 ENCOUNTER — HOSPITAL ENCOUNTER (INPATIENT)
Age: 31
LOS: 2 days | Discharge: HOME | DRG: 621 | End: 2021-09-16
Attending: SURGERY | Admitting: SURGERY
Payer: COMMERCIAL

## 2021-09-14 ENCOUNTER — ANESTHESIA (OUTPATIENT)
Dept: OPERATING ROOM | Age: 31
DRG: 621 | End: 2021-09-14
Payer: COMMERCIAL

## 2021-09-14 VITALS
DIASTOLIC BLOOD PRESSURE: 66 MMHG | TEMPERATURE: 96.1 F | RESPIRATION RATE: 19 BRPM | SYSTOLIC BLOOD PRESSURE: 134 MMHG | OXYGEN SATURATION: 100 %

## 2021-09-14 DIAGNOSIS — G47.33 OBSTRUCTIVE SLEEP APNEA: ICD-10-CM

## 2021-09-14 DIAGNOSIS — G89.18 ACUTE POSTOPERATIVE PAIN OF ABDOMEN: Primary | ICD-10-CM

## 2021-09-14 DIAGNOSIS — E66.01 MORBID OBESITY (HCC): ICD-10-CM

## 2021-09-14 DIAGNOSIS — R10.9 ACUTE POSTOPERATIVE PAIN OF ABDOMEN: Primary | ICD-10-CM

## 2021-09-14 LAB
ABO/RH: NORMAL
ANTIBODY SCREEN: NORMAL
GLUCOSE BLD-MCNC: 89 MG/DL (ref 70–99)
PERFORMED ON: NORMAL
PREGNANCY, URINE: NEGATIVE

## 2021-09-14 PROCEDURE — 7100000001 HC PACU RECOVERY - ADDTL 15 MIN: Performed by: SURGERY

## 2021-09-14 PROCEDURE — 2580000003 HC RX 258: Performed by: SURGERY

## 2021-09-14 PROCEDURE — 8E0W4CZ ROBOTIC ASSISTED PROCEDURE OF TRUNK REGION, PERCUTANEOUS ENDOSCOPIC APPROACH: ICD-10-PCS | Performed by: SURGERY

## 2021-09-14 PROCEDURE — 3700000001 HC ADD 15 MINUTES (ANESTHESIA): Performed by: SURGERY

## 2021-09-14 PROCEDURE — 6360000002 HC RX W HCPCS: Performed by: NURSE ANESTHETIST, CERTIFIED REGISTERED

## 2021-09-14 PROCEDURE — 86901 BLOOD TYPING SEROLOGIC RH(D): CPT

## 2021-09-14 PROCEDURE — 2500000003 HC RX 250 WO HCPCS: Performed by: NURSE ANESTHETIST, CERTIFIED REGISTERED

## 2021-09-14 PROCEDURE — 7100000000 HC PACU RECOVERY - FIRST 15 MIN: Performed by: SURGERY

## 2021-09-14 PROCEDURE — 2720000010 HC SURG SUPPLY STERILE: Performed by: SURGERY

## 2021-09-14 PROCEDURE — 86900 BLOOD TYPING SEROLOGIC ABO: CPT

## 2021-09-14 PROCEDURE — 2580000003 HC RX 258: Performed by: ANESTHESIOLOGY

## 2021-09-14 PROCEDURE — 86850 RBC ANTIBODY SCREEN: CPT

## 2021-09-14 PROCEDURE — 84703 CHORIONIC GONADOTROPIN ASSAY: CPT

## 2021-09-14 PROCEDURE — 6360000002 HC RX W HCPCS: Performed by: ANESTHESIOLOGY

## 2021-09-14 PROCEDURE — 3600000019 HC SURGERY ROBOT ADDTL 15MIN: Performed by: SURGERY

## 2021-09-14 PROCEDURE — 2709999900 HC NON-CHARGEABLE SUPPLY: Performed by: SURGERY

## 2021-09-14 PROCEDURE — 6370000000 HC RX 637 (ALT 250 FOR IP): Performed by: NURSE PRACTITIONER

## 2021-09-14 PROCEDURE — 6360000002 HC RX W HCPCS: Performed by: SURGERY

## 2021-09-14 PROCEDURE — 43775 LAP SLEEVE GASTRECTOMY: CPT | Performed by: SURGERY

## 2021-09-14 PROCEDURE — 3600000009 HC SURGERY ROBOT BASE: Performed by: SURGERY

## 2021-09-14 PROCEDURE — 2500000003 HC RX 250 WO HCPCS: Performed by: SURGERY

## 2021-09-14 PROCEDURE — S2900 ROBOTIC SURGICAL SYSTEM: HCPCS | Performed by: SURGERY

## 2021-09-14 PROCEDURE — 6370000000 HC RX 637 (ALT 250 FOR IP): Performed by: SURGERY

## 2021-09-14 PROCEDURE — 88307 TISSUE EXAM BY PATHOLOGIST: CPT

## 2021-09-14 PROCEDURE — 2580000003 HC RX 258: Performed by: NURSE PRACTITIONER

## 2021-09-14 PROCEDURE — 0DB64Z3 EXCISION OF STOMACH, PERCUTANEOUS ENDOSCOPIC APPROACH, VERTICAL: ICD-10-PCS | Performed by: SURGERY

## 2021-09-14 PROCEDURE — 88342 IMHCHEM/IMCYTCHM 1ST ANTB: CPT

## 2021-09-14 PROCEDURE — 2580000003 HC RX 258: Performed by: NURSE ANESTHETIST, CERTIFIED REGISTERED

## 2021-09-14 PROCEDURE — C9113 INJ PANTOPRAZOLE SODIUM, VIA: HCPCS | Performed by: SURGERY

## 2021-09-14 PROCEDURE — 3700000000 HC ANESTHESIA ATTENDED CARE: Performed by: SURGERY

## 2021-09-14 PROCEDURE — 1200000000 HC SEMI PRIVATE

## 2021-09-14 PROCEDURE — 6360000002 HC RX W HCPCS: Performed by: NURSE PRACTITIONER

## 2021-09-14 RX ORDER — DIPHENHYDRAMINE HYDROCHLORIDE 50 MG/ML
12.5 INJECTION INTRAMUSCULAR; INTRAVENOUS
Status: DISCONTINUED | OUTPATIENT
Start: 2021-09-14 | End: 2021-09-14 | Stop reason: HOSPADM

## 2021-09-14 RX ORDER — LIDOCAINE 4 G/G
1 PATCH TOPICAL DAILY
Status: DISCONTINUED | OUTPATIENT
Start: 2021-09-14 | End: 2021-09-16 | Stop reason: HOSPADM

## 2021-09-14 RX ORDER — OXYCODONE HYDROCHLORIDE AND ACETAMINOPHEN 5; 325 MG/1; MG/1
2 TABLET ORAL PRN
Status: DISCONTINUED | OUTPATIENT
Start: 2021-09-14 | End: 2021-09-14 | Stop reason: HOSPADM

## 2021-09-14 RX ORDER — SODIUM CHLORIDE 9 MG/ML
25 INJECTION, SOLUTION INTRAVENOUS PRN
Status: DISCONTINUED | OUTPATIENT
Start: 2021-09-14 | End: 2021-09-16 | Stop reason: HOSPADM

## 2021-09-14 RX ORDER — SODIUM CHLORIDE 0.9 % (FLUSH) 0.9 %
5-40 SYRINGE (ML) INJECTION PRN
Status: DISCONTINUED | OUTPATIENT
Start: 2021-09-14 | End: 2021-09-16 | Stop reason: HOSPADM

## 2021-09-14 RX ORDER — PROPOFOL 10 MG/ML
INJECTION, EMULSION INTRAVENOUS PRN
Status: DISCONTINUED | OUTPATIENT
Start: 2021-09-14 | End: 2021-09-14 | Stop reason: SDUPTHER

## 2021-09-14 RX ORDER — SODIUM CHLORIDE 9 MG/ML
INJECTION, SOLUTION INTRAVENOUS CONTINUOUS
Status: DISCONTINUED | OUTPATIENT
Start: 2021-09-14 | End: 2021-09-16 | Stop reason: HOSPADM

## 2021-09-14 RX ORDER — SCOLOPAMINE TRANSDERMAL SYSTEM 1 MG/1
1 PATCH, EXTENDED RELEASE TRANSDERMAL ONCE
Status: DISCONTINUED | OUTPATIENT
Start: 2021-09-14 | End: 2021-09-16 | Stop reason: HOSPADM

## 2021-09-14 RX ORDER — PREGABALIN 150 MG/1
150 CAPSULE ORAL ONCE
Status: COMPLETED | OUTPATIENT
Start: 2021-09-14 | End: 2021-09-14

## 2021-09-14 RX ORDER — FENTANYL CITRATE 50 UG/ML
25 INJECTION, SOLUTION INTRAMUSCULAR; INTRAVENOUS EVERY 5 MIN PRN
Status: DISCONTINUED | OUTPATIENT
Start: 2021-09-14 | End: 2021-09-14 | Stop reason: HOSPADM

## 2021-09-14 RX ORDER — LABETALOL HYDROCHLORIDE 5 MG/ML
5 INJECTION, SOLUTION INTRAVENOUS EVERY 10 MIN PRN
Status: DISCONTINUED | OUTPATIENT
Start: 2021-09-14 | End: 2021-09-14 | Stop reason: HOSPADM

## 2021-09-14 RX ORDER — HYDRALAZINE HYDROCHLORIDE 20 MG/ML
5 INJECTION INTRAMUSCULAR; INTRAVENOUS EVERY 10 MIN PRN
Status: DISCONTINUED | OUTPATIENT
Start: 2021-09-14 | End: 2021-09-14 | Stop reason: HOSPADM

## 2021-09-14 RX ORDER — APREPITANT 40 MG/1
80 CAPSULE ORAL ONCE
Status: COMPLETED | OUTPATIENT
Start: 2021-09-14 | End: 2021-09-14

## 2021-09-14 RX ORDER — ACETAMINOPHEN 160 MG/5ML
650 SOLUTION ORAL ONCE
Status: COMPLETED | OUTPATIENT
Start: 2021-09-14 | End: 2021-09-14

## 2021-09-14 RX ORDER — ONDANSETRON 2 MG/ML
4 INJECTION INTRAMUSCULAR; INTRAVENOUS EVERY 6 HOURS PRN
Status: DISCONTINUED | OUTPATIENT
Start: 2021-09-14 | End: 2021-09-16 | Stop reason: HOSPADM

## 2021-09-14 RX ORDER — SODIUM CHLORIDE 9 MG/ML
10 INJECTION INTRAVENOUS DAILY
Status: DISCONTINUED | OUTPATIENT
Start: 2021-09-14 | End: 2021-09-16 | Stop reason: HOSPADM

## 2021-09-14 RX ORDER — PROCHLORPERAZINE EDISYLATE 5 MG/ML
5 INJECTION INTRAMUSCULAR; INTRAVENOUS
Status: DISCONTINUED | OUTPATIENT
Start: 2021-09-14 | End: 2021-09-14 | Stop reason: HOSPADM

## 2021-09-14 RX ORDER — SODIUM CHLORIDE 9 MG/ML
INJECTION, SOLUTION INTRAVENOUS CONTINUOUS
Status: DISCONTINUED | OUTPATIENT
Start: 2021-09-14 | End: 2021-09-15

## 2021-09-14 RX ORDER — SCOLOPAMINE TRANSDERMAL SYSTEM 1 MG/1
1 PATCH, EXTENDED RELEASE TRANSDERMAL
Status: DISCONTINUED | OUTPATIENT
Start: 2021-09-14 | End: 2021-09-16 | Stop reason: HOSPADM

## 2021-09-14 RX ORDER — BUPIVACAINE HYDROCHLORIDE 5 MG/ML
INJECTION, SOLUTION EPIDURAL; INTRACAUDAL PRN
Status: DISCONTINUED | OUTPATIENT
Start: 2021-09-14 | End: 2021-09-14 | Stop reason: HOSPADM

## 2021-09-14 RX ORDER — ONDANSETRON 2 MG/ML
4 INJECTION INTRAMUSCULAR; INTRAVENOUS
Status: COMPLETED | OUTPATIENT
Start: 2021-09-14 | End: 2021-09-14

## 2021-09-14 RX ORDER — METHYLENE BLUE 10 MG/ML
INJECTION INTRAVENOUS PRN
Status: DISCONTINUED | OUTPATIENT
Start: 2021-09-14 | End: 2021-09-14 | Stop reason: HOSPADM

## 2021-09-14 RX ORDER — MEPERIDINE HYDROCHLORIDE 25 MG/ML
12.5 INJECTION INTRAMUSCULAR; INTRAVENOUS; SUBCUTANEOUS EVERY 5 MIN PRN
Status: DISCONTINUED | OUTPATIENT
Start: 2021-09-14 | End: 2021-09-14 | Stop reason: HOSPADM

## 2021-09-14 RX ORDER — LIDOCAINE HYDROCHLORIDE 20 MG/ML
INJECTION, SOLUTION INFILTRATION; PERINEURAL PRN
Status: DISCONTINUED | OUTPATIENT
Start: 2021-09-14 | End: 2021-09-14 | Stop reason: SDUPTHER

## 2021-09-14 RX ORDER — ROCURONIUM BROMIDE 10 MG/ML
INJECTION, SOLUTION INTRAVENOUS PRN
Status: DISCONTINUED | OUTPATIENT
Start: 2021-09-14 | End: 2021-09-14 | Stop reason: SDUPTHER

## 2021-09-14 RX ORDER — NALOXONE HYDROCHLORIDE 0.4 MG/ML
0.4 INJECTION, SOLUTION INTRAMUSCULAR; INTRAVENOUS; SUBCUTANEOUS PRN
Status: DISCONTINUED | OUTPATIENT
Start: 2021-09-14 | End: 2021-09-15

## 2021-09-14 RX ORDER — SODIUM CHLORIDE 9 MG/ML
INJECTION, SOLUTION INTRAVENOUS CONTINUOUS PRN
Status: DISCONTINUED | OUTPATIENT
Start: 2021-09-14 | End: 2021-09-14 | Stop reason: SDUPTHER

## 2021-09-14 RX ORDER — FENTANYL CITRATE 50 UG/ML
50 INJECTION, SOLUTION INTRAMUSCULAR; INTRAVENOUS EVERY 5 MIN PRN
Status: COMPLETED | OUTPATIENT
Start: 2021-09-14 | End: 2021-09-14

## 2021-09-14 RX ORDER — SODIUM CHLORIDE, SODIUM LACTATE, POTASSIUM CHLORIDE, AND CALCIUM CHLORIDE .6; .31; .03; .02 G/100ML; G/100ML; G/100ML; G/100ML
IRRIGANT IRRIGATION PRN
Status: DISCONTINUED | OUTPATIENT
Start: 2021-09-14 | End: 2021-09-14 | Stop reason: HOSPADM

## 2021-09-14 RX ORDER — METOCLOPRAMIDE HYDROCHLORIDE 5 MG/ML
10 INJECTION INTRAMUSCULAR; INTRAVENOUS EVERY 6 HOURS PRN
Status: DISCONTINUED | OUTPATIENT
Start: 2021-09-14 | End: 2021-09-15

## 2021-09-14 RX ORDER — PROCHLORPERAZINE EDISYLATE 5 MG/ML
10 INJECTION INTRAMUSCULAR; INTRAVENOUS EVERY 6 HOURS PRN
Status: DISCONTINUED | OUTPATIENT
Start: 2021-09-14 | End: 2021-09-16 | Stop reason: HOSPADM

## 2021-09-14 RX ORDER — SODIUM CHLORIDE 0.9 % (FLUSH) 0.9 %
5-40 SYRINGE (ML) INJECTION EVERY 12 HOURS SCHEDULED
Status: DISCONTINUED | OUTPATIENT
Start: 2021-09-14 | End: 2021-09-16 | Stop reason: HOSPADM

## 2021-09-14 RX ORDER — ONDANSETRON 2 MG/ML
INJECTION INTRAMUSCULAR; INTRAVENOUS PRN
Status: DISCONTINUED | OUTPATIENT
Start: 2021-09-14 | End: 2021-09-14 | Stop reason: SDUPTHER

## 2021-09-14 RX ORDER — FENTANYL CITRATE 50 UG/ML
INJECTION, SOLUTION INTRAMUSCULAR; INTRAVENOUS PRN
Status: DISCONTINUED | OUTPATIENT
Start: 2021-09-14 | End: 2021-09-14 | Stop reason: SDUPTHER

## 2021-09-14 RX ORDER — OXYCODONE HYDROCHLORIDE AND ACETAMINOPHEN 5; 325 MG/1; MG/1
1 TABLET ORAL PRN
Status: DISCONTINUED | OUTPATIENT
Start: 2021-09-14 | End: 2021-09-14 | Stop reason: HOSPADM

## 2021-09-14 RX ORDER — MIDAZOLAM HYDROCHLORIDE 1 MG/ML
INJECTION INTRAMUSCULAR; INTRAVENOUS PRN
Status: DISCONTINUED | OUTPATIENT
Start: 2021-09-14 | End: 2021-09-14 | Stop reason: SDUPTHER

## 2021-09-14 RX ORDER — MAGNESIUM HYDROXIDE 1200 MG/15ML
LIQUID ORAL PRN
Status: DISCONTINUED | OUTPATIENT
Start: 2021-09-14 | End: 2021-09-14 | Stop reason: HOSPADM

## 2021-09-14 RX ORDER — PANTOPRAZOLE SODIUM 40 MG/10ML
40 INJECTION, POWDER, LYOPHILIZED, FOR SOLUTION INTRAVENOUS DAILY
Status: DISCONTINUED | OUTPATIENT
Start: 2021-09-14 | End: 2021-09-16 | Stop reason: HOSPADM

## 2021-09-14 RX ADMIN — MIDAZOLAM HYDROCHLORIDE 2 MG: 2 INJECTION, SOLUTION INTRAMUSCULAR; INTRAVENOUS at 07:50

## 2021-09-14 RX ADMIN — HYDROMORPHONE HYDROCHLORIDE 0.2 MG: 10 INJECTION, SOLUTION INTRAMUSCULAR; INTRAVENOUS; SUBCUTANEOUS at 15:48

## 2021-09-14 RX ADMIN — PROCHLORPERAZINE EDISYLATE 10 MG: 5 INJECTION INTRAMUSCULAR; INTRAVENOUS at 21:01

## 2021-09-14 RX ADMIN — ACETAMINOPHEN 650 MG: 650 SOLUTION ORAL at 06:41

## 2021-09-14 RX ADMIN — SODIUM CHLORIDE: 9 INJECTION, SOLUTION INTRAVENOUS at 11:08

## 2021-09-14 RX ADMIN — SODIUM CHLORIDE: 9 INJECTION, SOLUTION INTRAVENOUS at 07:42

## 2021-09-14 RX ADMIN — HYDROMORPHONE HYDROCHLORIDE 0.5 MG: 1 INJECTION, SOLUTION INTRAMUSCULAR; INTRAVENOUS; SUBCUTANEOUS at 10:56

## 2021-09-14 RX ADMIN — PROPOFOL 200 MG: 10 INJECTION, EMULSION INTRAVENOUS at 07:55

## 2021-09-14 RX ADMIN — METHOCARBAMOL 1000 MG: 100 INJECTION, SOLUTION INTRAMUSCULAR; INTRAVENOUS at 15:32

## 2021-09-14 RX ADMIN — FENTANYL CITRATE 50 MCG: 50 INJECTION, SOLUTION INTRAMUSCULAR; INTRAVENOUS at 14:17

## 2021-09-14 RX ADMIN — ONDANSETRON 4 MG: 2 INJECTION INTRAMUSCULAR; INTRAVENOUS at 10:24

## 2021-09-14 RX ADMIN — ONDANSETRON 4 MG: 2 INJECTION INTRAMUSCULAR; INTRAVENOUS at 07:55

## 2021-09-14 RX ADMIN — HYDROMORPHONE HYDROCHLORIDE 0.5 MG: 1 INJECTION, SOLUTION INTRAMUSCULAR; INTRAVENOUS; SUBCUTANEOUS at 10:24

## 2021-09-14 RX ADMIN — ROCURONIUM BROMIDE 40 MG: 10 INJECTION INTRAVENOUS at 08:15

## 2021-09-14 RX ADMIN — SUGAMMADEX 200 MG: 100 INJECTION, SOLUTION INTRAVENOUS at 09:32

## 2021-09-14 RX ADMIN — CEFAZOLIN 3000 MG: 10 INJECTION, POWDER, FOR SOLUTION INTRAVENOUS at 07:55

## 2021-09-14 RX ADMIN — FENTANYL CITRATE 100 MCG: 50 INJECTION, SOLUTION INTRAMUSCULAR; INTRAVENOUS at 08:19

## 2021-09-14 RX ADMIN — Medication 10 ML: at 16:51

## 2021-09-14 RX ADMIN — Medication 10 ML: at 22:42

## 2021-09-14 RX ADMIN — PHENYLEPHRINE HYDROCHLORIDE 100 MCG: 10 INJECTION, SOLUTION INTRAMUSCULAR; INTRAVENOUS; SUBCUTANEOUS at 09:07

## 2021-09-14 RX ADMIN — FENTANYL CITRATE 100 MCG: 50 INJECTION, SOLUTION INTRAMUSCULAR; INTRAVENOUS at 07:55

## 2021-09-14 RX ADMIN — APREPITANT 80 MG: 40 CAPSULE ORAL at 06:41

## 2021-09-14 RX ADMIN — ENOXAPARIN SODIUM 40 MG: 40 INJECTION SUBCUTANEOUS at 06:40

## 2021-09-14 RX ADMIN — FENTANYL CITRATE 50 MCG: 50 INJECTION, SOLUTION INTRAMUSCULAR; INTRAVENOUS at 12:52

## 2021-09-14 RX ADMIN — PREGABALIN 150 MG: 150 CAPSULE ORAL at 06:41

## 2021-09-14 RX ADMIN — FENTANYL CITRATE 50 MCG: 50 INJECTION, SOLUTION INTRAMUSCULAR; INTRAVENOUS at 11:52

## 2021-09-14 RX ADMIN — PHENYLEPHRINE HYDROCHLORIDE 100 MCG: 10 INJECTION, SOLUTION INTRAMUSCULAR; INTRAVENOUS; SUBCUTANEOUS at 08:27

## 2021-09-14 RX ADMIN — FENTANYL CITRATE 50 MCG: 50 INJECTION, SOLUTION INTRAMUSCULAR; INTRAVENOUS at 15:23

## 2021-09-14 RX ADMIN — SODIUM CHLORIDE: 9 INJECTION, SOLUTION INTRAVENOUS at 06:35

## 2021-09-14 RX ADMIN — ONDANSETRON 4 MG: 2 INJECTION INTRAMUSCULAR; INTRAVENOUS at 16:51

## 2021-09-14 RX ADMIN — ENOXAPARIN SODIUM 40 MG: 40 INJECTION SUBCUTANEOUS at 21:01

## 2021-09-14 RX ADMIN — LIDOCAINE HYDROCHLORIDE 50 MG: 20 INJECTION, SOLUTION INFILTRATION; PERINEURAL at 07:55

## 2021-09-14 RX ADMIN — ROCURONIUM BROMIDE 50 MG: 10 INJECTION INTRAVENOUS at 07:55

## 2021-09-14 RX ADMIN — PHENYLEPHRINE HYDROCHLORIDE 100 MCG: 10 INJECTION, SOLUTION INTRAMUSCULAR; INTRAVENOUS; SUBCUTANEOUS at 08:36

## 2021-09-14 RX ADMIN — PANTOPRAZOLE SODIUM 40 MG: 40 INJECTION, POWDER, FOR SOLUTION INTRAVENOUS at 16:50

## 2021-09-14 ASSESSMENT — PAIN DESCRIPTION - DESCRIPTORS
DESCRIPTORS: ACHING;BURNING
DESCRIPTORS: ACHING

## 2021-09-14 ASSESSMENT — PAIN - FUNCTIONAL ASSESSMENT
PAIN_FUNCTIONAL_ASSESSMENT: 0-10
PAIN_FUNCTIONAL_ASSESSMENT: ACTIVITIES ARE NOT PREVENTED

## 2021-09-14 ASSESSMENT — PAIN DESCRIPTION - ORIENTATION: ORIENTATION: MID

## 2021-09-14 ASSESSMENT — PULMONARY FUNCTION TESTS
PIF_VALUE: 24
PIF_VALUE: 26
PIF_VALUE: 31
PIF_VALUE: 29
PIF_VALUE: 9
PIF_VALUE: 30
PIF_VALUE: 14
PIF_VALUE: 30
PIF_VALUE: 31
PIF_VALUE: 7
PIF_VALUE: 26
PIF_VALUE: 31
PIF_VALUE: 25
PIF_VALUE: 30
PIF_VALUE: 29
PIF_VALUE: 30
PIF_VALUE: 31
PIF_VALUE: 14
PIF_VALUE: 30
PIF_VALUE: 31
PIF_VALUE: 31
PIF_VALUE: 7
PIF_VALUE: 24
PIF_VALUE: 27
PIF_VALUE: 31
PIF_VALUE: 30
PIF_VALUE: 31
PIF_VALUE: 30
PIF_VALUE: 30
PIF_VALUE: 9
PIF_VALUE: 31
PIF_VALUE: 30
PIF_VALUE: 30
PIF_VALUE: 27
PIF_VALUE: 30
PIF_VALUE: 26
PIF_VALUE: 30
PIF_VALUE: 27
PIF_VALUE: 31
PIF_VALUE: 25
PIF_VALUE: 0
PIF_VALUE: 9
PIF_VALUE: 30
PIF_VALUE: 31
PIF_VALUE: 1
PIF_VALUE: 30
PIF_VALUE: 25
PIF_VALUE: 25
PIF_VALUE: 26
PIF_VALUE: 30
PIF_VALUE: 26
PIF_VALUE: 25
PIF_VALUE: 0
PIF_VALUE: 30
PIF_VALUE: 35
PIF_VALUE: 27
PIF_VALUE: 30
PIF_VALUE: 31
PIF_VALUE: 29
PIF_VALUE: 3
PIF_VALUE: 30
PIF_VALUE: 26
PIF_VALUE: 31
PIF_VALUE: 30
PIF_VALUE: 26
PIF_VALUE: 25
PIF_VALUE: 8
PIF_VALUE: 30
PIF_VALUE: 29
PIF_VALUE: 28
PIF_VALUE: 35
PIF_VALUE: 31
PIF_VALUE: 25
PIF_VALUE: 30
PIF_VALUE: 9
PIF_VALUE: 30
PIF_VALUE: 27
PIF_VALUE: 5
PIF_VALUE: 31
PIF_VALUE: 30
PIF_VALUE: 26
PIF_VALUE: 13
PIF_VALUE: 30
PIF_VALUE: 30
PIF_VALUE: 31
PIF_VALUE: 13
PIF_VALUE: 14
PIF_VALUE: 30
PIF_VALUE: 29
PIF_VALUE: 24
PIF_VALUE: 30
PIF_VALUE: 30
PIF_VALUE: 0
PIF_VALUE: 25
PIF_VALUE: 30
PIF_VALUE: 26
PIF_VALUE: 31
PIF_VALUE: 25
PIF_VALUE: 30
PIF_VALUE: 0
PIF_VALUE: 1
PIF_VALUE: 5
PIF_VALUE: 25
PIF_VALUE: 31
PIF_VALUE: 30
PIF_VALUE: 30
PIF_VALUE: 6
PIF_VALUE: 29
PIF_VALUE: 8
PIF_VALUE: 31
PIF_VALUE: 31
PIF_VALUE: 25
PIF_VALUE: 8
PIF_VALUE: 8
PIF_VALUE: 0
PIF_VALUE: 31
PIF_VALUE: 31
PIF_VALUE: 27
PIF_VALUE: 0
PIF_VALUE: 32
PIF_VALUE: 25
PIF_VALUE: 31

## 2021-09-14 ASSESSMENT — PAIN SCALES - GENERAL
PAINLEVEL_OUTOF10: 8
PAINLEVEL_OUTOF10: 8
PAINLEVEL_OUTOF10: 6
PAINLEVEL_OUTOF10: 7
PAINLEVEL_OUTOF10: 10
PAINLEVEL_OUTOF10: 8
PAINLEVEL_OUTOF10: 0
PAINLEVEL_OUTOF10: 8
PAINLEVEL_OUTOF10: 0
PAINLEVEL_OUTOF10: 0
PAINLEVEL_OUTOF10: 8

## 2021-09-14 ASSESSMENT — PAIN DESCRIPTION - FREQUENCY: FREQUENCY: CONTINUOUS

## 2021-09-14 ASSESSMENT — PAIN DESCRIPTION - PROGRESSION: CLINICAL_PROGRESSION: NOT CHANGED

## 2021-09-14 ASSESSMENT — PAIN DESCRIPTION - PAIN TYPE: TYPE: SURGICAL PAIN

## 2021-09-14 ASSESSMENT — PAIN DESCRIPTION - LOCATION: LOCATION: ABDOMEN

## 2021-09-14 ASSESSMENT — LIFESTYLE VARIABLES: SMOKING_STATUS: 0

## 2021-09-14 ASSESSMENT — PAIN DESCRIPTION - ONSET: ONSET: SUDDEN

## 2021-09-14 NOTE — ANESTHESIA PRE PROCEDURE
Department of Anesthesiology  Preprocedure Note       Name:  Jeff Meza   Age:  33 y.o.  :  1990                                          MRN:  [id number]         Date:  2021      Surgeon: Jordan Garcia): Shefali Caceres DO    Procedure: Procedure(s):  ROBOTIC ASSISTED LAPAROSCOPIC SLEEVE GASTRECTOMY    Medications prior to admission:   Prior to Admission medications    Medication Sig Start Date End Date Taking? Authorizing Provider   etonogestrel-ethinyl estradiol Tori Angelucci) 0.12-0.015 MG/24HR vaginal ring Place 1 each vaginally See Admin Instructions 21  Yes Brenda Gonzalez MD       Current medications:    Current Facility-Administered Medications   Medication Dose Route Frequency Provider Last Rate Last Admin    ceFAZolin (ANCEF) 3,000 mg in dextrose 5 % 100 mL IVPB  3,000 mg IntraVENous Once Shefali Caceres DO        scopolamine (TRANSDERM-SCOP) transdermal patch 1 patch  1 patch TransDERmal Once Shefali Caceres DO   1 patch at 21 0641    0.9 % sodium chloride infusion   IntraVENous Continuous Luevenia Kwaku,  mL/hr at 21 0635 New Bag at 21 8623       Allergies:  No Known Allergies    Problem List:    Patient Active Problem List   Diagnosis Code    Headache R51.9    Edema R60.9    Obesity E66.9    Vitamin D deficiency E55.9    Molar pregnancy 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 01XA    Wrist sprain S63.509A    Insomnia G47.00    Snores R06.83    Other fatigue R53.83    Moderate episode of recurrent major depressive disorder (HCC) F33.1    Generalized anxiety disorder F41.1    Tinea corporis B35.4    Morbid obesity with BMI of 50.0-59.9, adult (HCC) E66.01, Z68.43    Gastritis K29.70    Obstructive sleep apnea G47.33    Chronic GERD K21.9       Past Medical History:        Diagnosis Date    Asthma     controlled    Generalized anxiety disorder     Moderate episode of recurrent major depressive disorder ([facility] 2018    Morbid obesity with BMI of 50. 0-59.9, adult ([facility]     Pre-operative clearance     Unspecified vitamin D deficiency 4/13/2011       Past Surgical History:        Procedure Laterality Date    DILATION AND CURETTAGE OF UTERUS  04/19/2013    ENDOSCOPY, COLON, DIAGNOSTIC      Pt denies colonoscopy (9/14/21)    UPPER GASTROINTESTINAL ENDOSCOPY N/A 05/10/2021    EGD BIOPSY performed by Shane Kumar DO at [address] History:    Social History     Tobacco Use    Smoking status: Never Smoker    Smokeless tobacco: Never Used   Substance Use Topics    Alcohol use: Yes     Comment: rare                                Counseling given: Not Answered      Vital Signs (Current):   Vitals:    09/09/21 1457 09/14/21 0609   BP:  (!) 137/93   Pulse:  88   Resp:  18   Temp:  98.4 °F (36.9 °C)   TempSrc:  Oral   SpO2:  97%   Weight: (!) 361 lb (163.7 kg) (!) 361 lb (163.7 kg)   Height: 5' 9\" (1.753 m) 5' 9\" (1.753 m)                                              BP Readings from Last 3 Encounters:   09/14/21 (!) 137/93   09/08/21 115/80   08/04/21 (!) 151/88       NPO Status: Time of last liquid consumption: 1900                        Time of last solid consumption: 1300                        Date of last liquid consumption: 09/13/21                        Date of last solid food consumption: 09/12/21    BMI:   Wt Readings from Last 3 Encounters:   09/14/21 (!) 361 lb (163.7 kg)   09/08/21 (!) 368 lb 9.6 oz (167.2 kg)   08/12/21 (!) 370 lb (167.8 kg)     Body mass index is 53.31 kg/m².     CBC:   Lab Results   Component Value Date    WBC 8.6 09/08/2021    RBC 5.18 09/08/2021    HGB 15.2 09/08/2021    HCT 44.5 09/08/2021    MCV 85.9 09/08/2021    RDW 14.8 09/08/2021     09/08/2021       CMP:   Lab Results   Component Value Date     09/08/2021    K 4.1 09/08/2021     09/08/2021    CO2 20 09/08/2021    BUN 10 09/08/2021    CREATININE 0.6 09/08/2021    GFRAA >60 09/08/2021    GFRAA >60 04/19/2013    AGRATIO 1.1 09/08/2021 LABGLOM >60 09/08/2021    GLUCOSE 72 09/08/2021    PROT 7.5 09/08/2021    PROT 6.7 04/12/2011    CALCIUM 9.3 09/08/2021    BILITOT 0.3 09/08/2021    ALKPHOS 52 09/08/2021    AST 19 09/08/2021    ALT 21 09/08/2021       POC Tests:   Recent Labs     09/14/21  3613   POCGLU 89       Coags: No results found for: PROTIME, INR, APTT    HCG (If Applicable):   Lab Results   Component Value Date    PREGTESTUR Negative 09/14/2021        ABGs: No results found for: PHART, PO2ART, LUE4FYH, DSY0UAL, BEART, R1UNSXZP     Type & Screen (If Applicable):  Lab Results   Component Value Date    LABABO O 04/19/2013    LABRH Positive 04/19/2013       Drug/Infectious Status (If Applicable):  No results found for: HIV, HEPCAB    COVID-19 Screening (If Applicable):   Lab Results   Component Value Date    COVID19 Not Detected 05/04/2021           Anesthesia Evaluation  Patient summary reviewed and Nursing notes reviewed no history of anesthetic complications:   Airway: Mallampati: II  TM distance: >3 FB   Neck ROM: full  Mouth opening: > = 3 FB Dental: normal exam         Pulmonary: breath sounds clear to auscultation  (+) sleep apnea: on CPAP,  asthma: allergic asthma,     (-) not a current smoker (never)                          ROS comment: Had covid Feb 2021  Not hospitalized     Cardiovascular:  Exercise tolerance: good (>4 METS),       (-) past MI    NYHA Classification: II    Rhythm: regular  Rate: normal           Beta Blocker:  Not on Beta Blocker         Neuro/Psych:               GI/Hepatic/Renal:   (+) GERD: well controlled, morbid obesity (bmi  53   )          Endo/Other: Negative Endo/Other ROS                    Abdominal:   (+) obese,           Vascular: negative vascular ROS. Other Findings:           Anesthesia Plan      general     ASA 3       Induction: intravenous. MIPS: Postoperative opioids intended and Prophylactic antiemetics administered. Anesthetic plan and risks discussed with patient.       Plan discussed with CRNA.     Attending anesthesiologist reviewed and agrees with Preprocedure content              Sahra Juarez DO   9/14/2021

## 2021-09-14 NOTE — OP NOTE
[facility]   [employer]              Procedure Note    Indications: The patient was evaluated by our multidisciplinary team and was found to be a good candidate for weight loss surgery. Body mass index is 52.87 kg/m². Pre-operative Diagnosis:   Patient Active Problem List   Diagnosis    Headache    Edema    Obesity    Vitamin D deficiency    Molar pregnancy    Contusion of right knee    Wrist sprain    Insomnia    Snores    Other fatigue    Moderate episode of recurrent major depressive disorder (HCC)    Generalized anxiety disorder    Tinea corporis    Morbid obesity with BMI of 50.0-59.9, adult ([address]    Gastritis    Obstructive sleep apnea    Chronic GERD         Post-operative Diagnosis:   Same      Procedure:    - Robotic Sleeve Gastrectomy       Surgeon: Erick Augustine DO    Anesthesia: General endotracheal anesthesia        Procedure Details   The patient was seen again in the Holding Room. The risks, benefits, complications, treatment options, and expected outcomes were discussed with the patient and/or family. The possibilities of reaction to medication, pulmonary aspiration, perforation of viscus, bleeding, recurrent infection, strictures, leaks, failure to lose weight, regaining weight,  the need for additional procedures, failure to diagnose a condition, and creating a complication requiring transfusion or operation were discussed. There was concurrence with the proposed plan and informed consent was obtained. The site of surgery was properly noted/marked. The patient was taken to Operating Room, identified as [facility] and the procedure verified as Laparoscopic Sleeve Gastrectomy. A Time Out was held and the above information confirmed. The patient was placed in the supine position and general anesthesia was induced, along with placement of orogastric tube and SCD's. Lovenox SQ given pre-operatively. IV Antibiotics given pre-operatively.  All pressure points were padded properly. A left upper quadrant incision was made and a veres needle was inserted after confirming with saline drop test.  After adequate pneumoperitoneum was obtained, a 5 mm trocar was inserted under direct vision and there was no injury on initial trocar placement. Additional ports were placed in the standard positions under direct vision. The abdomen was initially explored and no abnormalities were identified. A liver retractor was inserted through a small incision in the upper midline, lifted the liver upward, and was then secured to the OR table. The pylorus was identified and measurement was taken approximately 4 cm from the pylorus along the greater curvature of the stomach. The vessel sealer was used to take down the attachments and short gastric vessels along the greater curve of the stomach. This was continued until all attachments were taken down and continued to the gastro-esophageal junction. A 36 Omani dilator was advanced along the lesser curvature and into the pylorus. A stapler was fired along the dilator to create an appropriate sized gastric sleeve pouch. Green firing followed by blue firings were used to create the sleeve. The staple line looked very healthy and no bleeding from staple line. The stomach was confirmed to be completely divided with uniform shape,  no twist in the sleeve and wide patent incisura. A 2-0 Stratafix suture was used to over-sew and imbricate the sleeve staple line. The staple line was completely over-sewn over dilator. The stomach distal to the staple line was clamped and methylene blue saline was injected under pressure confirming no obstruction and no leak. The abdomen was carefully inspected and there was no bleeding or any other abnormality. The stomach was brought out through the RUQ incision. Hemostasis was confirmed. The 12 port sites was closed using 0.0 Vicryl  suture at the level of the fascia. The trocar site skin wounds were closed using 4.0 Vicryl after copious Irrigation of the wounds. Local Anesthetic used at port sites then Dermabond applied. Instrument, sponge, and needle counts were correct at the conclusion of the case. Findings: Morbid Obesity. Estimated Blood Loss:  Minimal           Drains: none           Specimens: Stomach (Subtotal)           Complications:  None; patient tolerated the procedure well. Disposition: PACU - hemodynamically stable. Condition: stable    Attending Attestation: I was present and scrubbed for the entire procedure.

## 2021-09-14 NOTE — PROGRESS NOTES
4 Eyes Admission Assessment     I agree as the admission nurse that 2 RN's have performed a thorough Head to Toe Skin Assessment on the patient. ALL assessment sites listed below have been assessed on admission. Areas assessed by both nurses:   [x]   Head, Face, and Ears   [x]   Shoulders, Back, and Chest  [x]   Arms, Elbows, and Hands   [x]   Coccyx, Sacrum, and Ischium  [x]   Legs, Feet, and Heels        Does the Patient have Skin Breakdown?   NO         Wong Prevention initiated:  NA   Wound Care Orders initiated:  NA      WOC nurse consulted for Pressure Injury (Stage 3,4, Unstageable, DTI, NWPT, and Complex wounds) or Wong score 18 or lower:  NA      Nurse 1 eSignature: Electronically signed by Twila Alvarado RN on 9/14/21 at 4:43 PM EDT    **SHARE this note so that the co-signing nurse is able to place an eSignature**    Nurse 2 eSignature: Electronically signed by Solomon Chin RN on 9/16/21 at 6:28 AM EDT

## 2021-09-14 NOTE — PROGRESS NOTES
Pt admitted to pacu s/p ROBOTIC ASSISTED LAPAROSCOPIC SLEEVE GASTRECTOMY.  Pt drowsy- % on 4L , abd binder in place w surgi glue to trocar sites

## 2021-09-14 NOTE — H&P
Karli Perez    [id number]  [facility] Same Day Surgery Update H & P  Department of General Surgery   Surgical Service   Pre-operative History and Physical  Last H & P within the last 30 days. DIAGNOSIS:   Morbid obesity ([facility] [E66.01]  Obstructive sleep apnea [G47.33]    Procedure(s):  ROBOTIC ASSISTED LAPAROSCOPIC SLEEVE GASTRECTOMY    History obtained from: Patient interview and EHR     HISTORY OF PRESENT ILLNESS:   Patient is a morbidly obese (Body mass index is 53.31 kg/m².), 33 y.o. female who presents today for the above procedure. Patient with a history of multiple weight loss attempts without long term success. Covid 19:  Patient denies fever, chills, worsening cough, or known exposure to Covid-19.        Past Medical History:        Diagnosis Date    Asthma     controlled    Generalized anxiety disorder     Moderate episode of recurrent major depressive disorder ([facility] 7/19/2018    Morbid obesity with BMI of 50.0-59.9, adult ([facility]     Pre-operative clearance     Unspecified vitamin D deficiency 4/13/2011     Past Surgical History:        Procedure Laterality Date    DILATION AND CURETTAGE OF UTERUS  04/19/2013    ENDOSCOPY, COLON, DIAGNOSTIC      Pt denies colonoscopy (9/14/21)    UPPER GASTROINTESTINAL ENDOSCOPY N/A 05/10/2021    EGD BIOPSY performed by Erlin Cortez DO at [facility]     Past Social History:  Social History     Socioeconomic History    Marital status: Single     Spouse name: None    Number of children: None    Years of education: None    Highest education level: None   Occupational History    None   Tobacco Use    Smoking status: Never Smoker    Smokeless tobacco: Never Used   Vaping Use    Vaping Use: Never used   Substance and Sexual Activity    Alcohol use: Yes     Comment: rare    Drug use: No    Sexual activity: Yes     Partners: Male   Other Topics Concern    None   Social History Narrative    None     Social Determinants of Health     Financial Resource Strain: Low Risk     Difficulty of Paying Living Expenses: Not hard at all   Food Insecurity: No Food Insecurity    Worried About Running Out of Food in the Last Year: Never true    Shanda of Food in the Last Year: Never true   Transportation Needs:     Lack of Transportation (Medical):  Lack of Transportation (Non-Medical):    Physical Activity:     Days of Exercise per Week:     Minutes of Exercise per Session:    Stress:     Feeling of Stress :    Social Connections:     Frequency of Communication with Friends and Family:     Frequency of Social Gatherings with Friends and Family:     Attends Gnosticism Services:     Active Member of Clubs or Organizations:     Attends Club or Organization Meetings:     Marital Status:    Intimate Partner Violence:     Fear of Current or Ex-Partner:     Emotionally Abused:     Physically Abused:     Sexually Abused:          Medications Prior to Admission:      Prior to Admission medications    Medication Sig Start Date End Date Taking? Authorizing Provider   etonogestrel-ethinyl estradiol Lindy Antis) 0.12-0.015 MG/24HR vaginal ring Place 1 each vaginally See Admin Instructions 1/19/21  Yes Manolo Lucero MD         Allergies:  Patient has no known allergies. PHYSICAL EXAM:      BP (!) 137/93   Pulse 88   Temp 98.4 °F (36.9 °C) (Oral)   Resp 18   Ht 5' 9\" (1.753 m)   Wt (!) 361 lb (163.7 kg)   LMP 09/04/2021   SpO2 97%   BMI 53.31 kg/m²      Airway:  Airway patent with no audible stridor    Heart:  Regular rate and rhythm, no murmur noted    Lungs:  No increased work of breathing, good air exchange, clear to auscultation bilaterally, no crackles or wheezing    Abdomen:  Soft, non-distended, non-tender, no rebound tenderness or guarding, and no masses palpated    ASSESSMENT AND PLAN:    1. The patients history and physical was obtained and signed off by the pre-admission testing department.   Patient seen and focused exam done today- no new changes since last physical exam on 9/8/21    2. Access to ancillary services are available per request of the provider.     LISA Gamboa - CNP     9/14/2021

## 2021-09-14 NOTE — PROGRESS NOTES
Department of Bariatric Surgery    Post Op Note  Subjective:  C/o ain \"on the inside\". Wants ice. Has not voided. No nausea or emesis. Receiving robaxin IV currently and PCA being set up. Objective:    Anesthesia type: General    Exam:   VITALS:  BP (!) 141/97   Pulse 85   Temp 97.9 °F (36.6 °C) (Oral)   Resp 22   Ht 5' 9\" (1.753 m)   Wt (!) 358 lb (162.4 kg)   LMP 09/04/2021   SpO2 98%   BMI 52.87 kg/m²   24HR INTAKE/OUTPUT:    Intake/Output Summary (Last 24 hours) at 9/14/2021 1552  Last data filed at 9/14/2021 1515  Gross per 24 hour   Intake 1388 ml   Output 700 ml   Net 688 ml     Post-op vital signs:  Stable     Exam:General appearance: Oriented, appears stated age  Lungs: clear to auscultation bilaterally  Heart: regular rate and rhythm, S1, S2 normal  Abdomen: soft, obese, appropriately-tender; no guarding, non-peritoneal, abd binder in place  Trocar sites C/D/I    Assessment and Plan  Pt is a 33year old female s/p Robotic assisted Laparoscopic Gastrectomy POD #0    Pain management, PCA.   Add robaxin and lidoderm  FeNa:  Diet - NPO except ice , Fluids NS @ 150 ml/hour  :  Watch to void  Ambulation: OOB to chair, must walk every 2 hours  Respiratory:  IS at bedside, encourage hourly IS and deep breathing  Prophylaxis: AC boots, lovenox

## 2021-09-14 NOTE — PROGRESS NOTES
Pt admitted to [facility] for Robotic Assisted Laparoscopic Sleeve Gastrectomy. COVID test negative on 9/8/21 and noted in Chart Review. Alert and oriented x 4; speech clear; appropriate judgement and safety awareness. Pt reports chronic back pain; \"5\" without any radiating pain reported. Pt reports feeling anxious regarding surgery. Pt's friend came back to hospitals preop room to sit with pt while waiting for OR. IV placed in right forearm 20 g; T&S x2 completed and sent to lab. Blood glucose 89. Urine pregnancy negative. Pt medicated as ordered. Pt instructed on importance of ambulation and use of IS post procedure. IV Ancef on stretcher pole for OR administration.

## 2021-09-14 NOTE — PROGRESS NOTES
PACU Transfer Note    Vitals:    09/14/21 1526   BP: 138/85   Pulse: 86   Resp: 28   Temp: 97 °F (36.1 °C)   SpO2: 95  RA     In: 388 [I.V.:388]  Out: -     Pain assessment:    Pain Level: 8    Report given to Receiving unit RN.    9/14/2021 3:28 PM

## 2021-09-14 NOTE — ANESTHESIA POSTPROCEDURE EVALUATION
Department of Anesthesiology  Postprocedure Note    Patient: Neeta Bassett  MRN: [id number]  YOB: 1990  Date of evaluation: 9/14/2021  Time:  2:08 PM     Procedure Summary     Date: 09/14/21 Room / Location: [address] [place] [address] / [address]    Anesthesia Start: 9061 Anesthesia Stop: 8564    Procedure: ROBOTIC ASSISTED LAPAROSCOPIC SLEEVE GASTRECTOMY (N/A ) Diagnosis:       Morbid obesity ([address]      Obstructive sleep apnea      (Morbid obesity ([address] [E66.01] Obstructive sleep apnea [G47.33])    Surgeons: Laureen Castillo DO Responsible Provider: Shelby Varela MD    Anesthesia Type: general ASA Status: 3          Anesthesia Type: general    Leonardo Phase I: Leonardo Score: 5    Leonardo Phase II:      Last vitals: Reviewed and per EMR flowsheets.        Anesthesia Post Evaluation    Patient location during evaluation: PACU  Patient participation: complete - patient participated  Level of consciousness: awake  Pain score: 2  Airway patency: patent  Nausea & Vomiting: no nausea and no vomiting  Complications: no  Cardiovascular status: hemodynamically stable  Respiratory status: acceptable  Hydration status: euvolemic
- - -

## 2021-09-15 LAB
ANION GAP SERPL CALCULATED.3IONS-SCNC: 12 MMOL/L (ref 3–16)
BUN BLDV-MCNC: 3 MG/DL (ref 7–20)
CALCIUM SERPL-MCNC: 8.8 MG/DL (ref 8.3–10.6)
CHLORIDE BLD-SCNC: 104 MMOL/L (ref 99–110)
CO2: 22 MMOL/L (ref 21–32)
CREAT SERPL-MCNC: 0.6 MG/DL (ref 0.6–1.1)
GFR AFRICAN AMERICAN: >60
GFR NON-AFRICAN AMERICAN: >60
GLUCOSE BLD-MCNC: 97 MG/DL (ref 70–99)
HCT VFR BLD CALC: 44.8 % (ref 36–48)
HEMOGLOBIN: 15.4 G/DL (ref 12–16)
MCH RBC QN AUTO: 29.5 PG (ref 26–34)
MCHC RBC AUTO-ENTMCNC: 34.5 G/DL (ref 31–36)
MCV RBC AUTO: 85.5 FL (ref 80–100)
PDW BLD-RTO: 14.7 % (ref 12.4–15.4)
PLATELET # BLD: 300 K/UL (ref 135–450)
PMV BLD AUTO: 8.3 FL (ref 5–10.5)
POTASSIUM SERPL-SCNC: 3.9 MMOL/L (ref 3.5–5.1)
RBC # BLD: 5.24 M/UL (ref 4–5.2)
SODIUM BLD-SCNC: 138 MMOL/L (ref 136–145)
WBC # BLD: 14.1 K/UL (ref 4–11)

## 2021-09-15 PROCEDURE — 80048 BASIC METABOLIC PNL TOTAL CA: CPT

## 2021-09-15 PROCEDURE — 6370000000 HC RX 637 (ALT 250 FOR IP): Performed by: NURSE PRACTITIONER

## 2021-09-15 PROCEDURE — 2580000003 HC RX 258: Performed by: NURSE PRACTITIONER

## 2021-09-15 PROCEDURE — 6360000002 HC RX W HCPCS: Performed by: SURGERY

## 2021-09-15 PROCEDURE — 6360000002 HC RX W HCPCS: Performed by: NURSE PRACTITIONER

## 2021-09-15 PROCEDURE — C9113 INJ PANTOPRAZOLE SODIUM, VIA: HCPCS | Performed by: SURGERY

## 2021-09-15 PROCEDURE — 36415 COLL VENOUS BLD VENIPUNCTURE: CPT

## 2021-09-15 PROCEDURE — 1200000000 HC SEMI PRIVATE

## 2021-09-15 PROCEDURE — 2580000003 HC RX 258: Performed by: SURGERY

## 2021-09-15 PROCEDURE — 85027 COMPLETE CBC AUTOMATED: CPT

## 2021-09-15 RX ORDER — OXYCODONE HCL 5 MG/5 ML
5 SOLUTION, ORAL ORAL EVERY 4 HOURS PRN
Status: DISCONTINUED | OUTPATIENT
Start: 2021-09-15 | End: 2021-09-16 | Stop reason: HOSPADM

## 2021-09-15 RX ORDER — KETOROLAC TROMETHAMINE 30 MG/ML
30 INJECTION, SOLUTION INTRAMUSCULAR; INTRAVENOUS ONCE
Status: COMPLETED | OUTPATIENT
Start: 2021-09-15 | End: 2021-09-15

## 2021-09-15 RX ORDER — ONDANSETRON 4 MG/1
4 TABLET, FILM COATED ORAL EVERY 6 HOURS PRN
Qty: 30 TABLET | Refills: 0 | Status: SHIPPED | OUTPATIENT
Start: 2021-09-15

## 2021-09-15 RX ORDER — HYOSCYAMINE SULFATE 0.12 MG/1
1 TABLET SUBLINGUAL EVERY 6 HOURS PRN
Qty: 30 EACH | Refills: 0 | Status: SHIPPED | OUTPATIENT
Start: 2021-09-15 | End: 2022-03-02 | Stop reason: ALTCHOICE

## 2021-09-15 RX ORDER — OXYCODONE HCL 5 MG/5 ML
10 SOLUTION, ORAL ORAL EVERY 4 HOURS PRN
Status: DISCONTINUED | OUTPATIENT
Start: 2021-09-15 | End: 2021-09-16 | Stop reason: HOSPADM

## 2021-09-15 RX ORDER — HYOSCYAMINE SULFATE 0.5 MG/ML
500 INJECTION, SOLUTION SUBCUTANEOUS ONCE
Status: COMPLETED | OUTPATIENT
Start: 2021-09-15 | End: 2021-09-15

## 2021-09-15 RX ORDER — METOCLOPRAMIDE HYDROCHLORIDE 5 MG/ML
10 INJECTION INTRAMUSCULAR; INTRAVENOUS EVERY 6 HOURS SCHEDULED
Status: DISCONTINUED | OUTPATIENT
Start: 2021-09-15 | End: 2021-09-16 | Stop reason: HOSPADM

## 2021-09-15 RX ORDER — OMEPRAZOLE 20 MG/1
20 CAPSULE, DELAYED RELEASE ORAL DAILY
Qty: 30 CAPSULE | Refills: 3 | Status: SHIPPED | OUTPATIENT
Start: 2021-09-15

## 2021-09-15 RX ORDER — METHOCARBAMOL 750 MG/1
750 TABLET, FILM COATED ORAL 4 TIMES DAILY
Qty: 40 TABLET | Refills: 0 | Status: SHIPPED | OUTPATIENT
Start: 2021-09-15 | End: 2021-09-25

## 2021-09-15 RX ORDER — ACETAMINOPHEN 160 MG/5ML
650 SOLUTION ORAL EVERY 4 HOURS PRN
Status: DISCONTINUED | OUTPATIENT
Start: 2021-09-15 | End: 2021-09-16 | Stop reason: HOSPADM

## 2021-09-15 RX ORDER — OXYCODONE HYDROCHLORIDE AND ACETAMINOPHEN 5; 325 MG/1; MG/1
1 TABLET ORAL EVERY 6 HOURS PRN
Qty: 28 TABLET | Refills: 0 | Status: SHIPPED | OUTPATIENT
Start: 2021-09-15 | End: 2021-09-22

## 2021-09-15 RX ADMIN — OXYCODONE HYDROCHLORIDE 10 MG: 5 SOLUTION ORAL at 21:07

## 2021-09-15 RX ADMIN — PANTOPRAZOLE SODIUM 40 MG: 40 INJECTION, POWDER, FOR SOLUTION INTRAVENOUS at 08:37

## 2021-09-15 RX ADMIN — KETOROLAC TROMETHAMINE 30 MG: 30 INJECTION, SOLUTION INTRAMUSCULAR; INTRAVENOUS at 13:43

## 2021-09-15 RX ADMIN — ENOXAPARIN SODIUM 40 MG: 40 INJECTION SUBCUTANEOUS at 08:38

## 2021-09-15 RX ADMIN — OXYCODONE HYDROCHLORIDE 10 MG: 5 SOLUTION ORAL at 07:19

## 2021-09-15 RX ADMIN — Medication 10 ML: at 21:13

## 2021-09-15 RX ADMIN — HYOSCYAMINE SULFATE 500 MCG: 0.5 INJECTION, SOLUTION SUBCUTANEOUS at 13:43

## 2021-09-15 RX ADMIN — ONDANSETRON 4 MG: 2 INJECTION INTRAMUSCULAR; INTRAVENOUS at 13:16

## 2021-09-15 RX ADMIN — OXYCODONE HYDROCHLORIDE 10 MG: 5 SOLUTION ORAL at 11:43

## 2021-09-15 RX ADMIN — METOCLOPRAMIDE HYDROCHLORIDE 10 MG: 5 INJECTION INTRAMUSCULAR; INTRAVENOUS at 21:08

## 2021-09-15 RX ADMIN — METOCLOPRAMIDE HYDROCHLORIDE 10 MG: 5 INJECTION INTRAMUSCULAR; INTRAVENOUS at 13:43

## 2021-09-15 RX ADMIN — Medication 10 ML: at 08:38

## 2021-09-15 RX ADMIN — METHOCARBAMOL 1000 MG: 100 INJECTION, SOLUTION INTRAMUSCULAR; INTRAVENOUS at 13:08

## 2021-09-15 RX ADMIN — METHOCARBAMOL 1000 MG: 100 INJECTION, SOLUTION INTRAMUSCULAR; INTRAVENOUS at 04:04

## 2021-09-15 RX ADMIN — ENOXAPARIN SODIUM 40 MG: 40 INJECTION SUBCUTANEOUS at 21:08

## 2021-09-15 ASSESSMENT — PAIN DESCRIPTION - ONSET: ONSET: ON-GOING

## 2021-09-15 ASSESSMENT — PAIN DESCRIPTION - FREQUENCY: FREQUENCY: CONTINUOUS

## 2021-09-15 ASSESSMENT — PAIN - FUNCTIONAL ASSESSMENT: PAIN_FUNCTIONAL_ASSESSMENT: PREVENTS OR INTERFERES SOME ACTIVE ACTIVITIES AND ADLS

## 2021-09-15 ASSESSMENT — PAIN DESCRIPTION - ORIENTATION: ORIENTATION: RIGHT;LEFT;MID

## 2021-09-15 ASSESSMENT — PAIN SCALES - GENERAL
PAINLEVEL_OUTOF10: 8
PAINLEVEL_OUTOF10: 7
PAINLEVEL_OUTOF10: 8
PAINLEVEL_OUTOF10: 7
PAINLEVEL_OUTOF10: 7

## 2021-09-15 ASSESSMENT — PAIN DESCRIPTION - LOCATION: LOCATION: ABDOMEN

## 2021-09-15 ASSESSMENT — PAIN DESCRIPTION - DESCRIPTORS: DESCRIPTORS: ACHING;DISCOMFORT;SHARP

## 2021-09-15 ASSESSMENT — PAIN DESCRIPTION - PAIN TYPE: TYPE: SURGICAL PAIN

## 2021-09-15 ASSESSMENT — PAIN DESCRIPTION - PROGRESSION: CLINICAL_PROGRESSION: GRADUALLY WORSENING

## 2021-09-15 NOTE — PROGRESS NOTES
Continues to struggle with taking adequate oral intake  Reglan changed to every 6 hours ATC  Levsin given IV  Continue IV fluids  Observe overnight

## 2021-09-15 NOTE — PROGRESS NOTES
Pt alert and oriented. VSS. Pt voiding adequately. Tolerating ice chips. IVF infusing. Pt using PCA pump for pain. Lap sites CDI. Call light within reach.  Will continue to monitor

## 2021-09-15 NOTE — PROGRESS NOTES
Surgery Daily Progress Note  Navya Smith  CC: Morbid Obesity  Subjective :  No emesis overnight. Some nausea. C/o pain. Increases with activity. Nausea also increases with activity. Has not been up OOB and ambulating. States she has been too sleepy. Objective    Infusions:   sodium chloride 100 mL/hr at 09/14/21 0635    HYDROmorphone      sodium chloride      sodium chloride 150 mL/hr at 09/14/21 1108        I/O:I/O last 3 completed shifts: In: 1389.6 [I.V.:1389.6]  Out: 700 [Urine:700]           Wt Readings from Last 1 Encounters:   09/14/21 (!) 358 lb (162.4 kg)                 LABS:    Recent Labs     09/15/21  0530   WBC 14.1*   HGB 15.4   HCT 44.8   MCV 85.5             Exam:BP (!) 154/85   Pulse 80   Temp 98 °F (36.7 °C) (Axillary)   Resp 18   Ht 5' 9\" (1.753 m)   Wt (!) 358 lb (162.4 kg)   LMP 09/04/2021   SpO2 97%   BMI 52.87 kg/m²   General appearance: alert, appears stated age and cooperative  Lungs: clear to auscultation bilaterally  Heart: regular rate and rhythm, S1, S2 normal, no murmur, click, rub or gallop  Abdomen: soft, appropriately-tender; bowel sounds quiet  Trocar sites well approx      ASSESSMENT/PLAN: Pt. is a 33 y.o. female s/p Robotic Assisted Laparoscopic Sleeve Gastrectomy POD# 1  Encourage patient to be more active  Encourage IS  Start on Bariatric Clears      Monitor progress throughout the day. D/C late afternoon as long as able to take adequate po to remain hydrated without IVF, voiding, pain and nausea tolerable with oral meds, using IS frequently and ambulating. D/W Dr. Mary Jo Carmen, APRN - CNP 9/15/2021 6:10 AM  [phone]    I have personally examined this patient, reviewed all pertinent labs and radiologic imaging, and agree with the Advanced Practice Provider note.     Likely DC home today once tolerating clears    Sharon Butler

## 2021-09-15 NOTE — PROGRESS NOTES
Pt remains A&Ox4 and VSS. Surgical sites clean, dry, intact with abdominal binder in place. Pt ambulating and voiding freely. IS teaching reinforced and encouraged. Pt reports tolerating clear liquids at this time. SCDs remain in place while Pt is in bed. Call light within reach.  Will continue to monitor

## 2021-09-15 NOTE — CARE COORDINATION
Cm following, pt from home alone, has family support, POD#1 gastric sleeve. Pt plans to DC home with family support no needs at DC anticipated. Pt with pain control issus and min PO intake, observing over night.    Electronically signed by Vickie Ruiz RN on 9/15/2021 at 3:40 PM  [phone]

## 2021-09-16 VITALS
SYSTOLIC BLOOD PRESSURE: 142 MMHG | OXYGEN SATURATION: 98 % | TEMPERATURE: 98.4 F | RESPIRATION RATE: 16 BRPM | BODY MASS INDEX: 43.4 KG/M2 | HEART RATE: 78 BPM | HEIGHT: 69 IN | DIASTOLIC BLOOD PRESSURE: 91 MMHG | WEIGHT: 293 LBS

## 2021-09-16 LAB
ALBUMIN SERPL-MCNC: 3.4 G/DL (ref 3.4–5)
ANION GAP SERPL CALCULATED.3IONS-SCNC: 14 MMOL/L (ref 3–16)
BUN BLDV-MCNC: 3 MG/DL (ref 7–20)
CALCIUM SERPL-MCNC: 8.4 MG/DL (ref 8.3–10.6)
CHLORIDE BLD-SCNC: 105 MMOL/L (ref 99–110)
CO2: 17 MMOL/L (ref 21–32)
CREAT SERPL-MCNC: <0.5 MG/DL (ref 0.6–1.1)
GFR AFRICAN AMERICAN: >60
GFR NON-AFRICAN AMERICAN: >60
GLUCOSE BLD-MCNC: 78 MG/DL (ref 70–99)
HCT VFR BLD CALC: 43.8 % (ref 36–48)
HEMOGLOBIN: 14.3 G/DL (ref 12–16)
MCH RBC QN AUTO: 28.7 PG (ref 26–34)
MCHC RBC AUTO-ENTMCNC: 32.8 G/DL (ref 31–36)
MCV RBC AUTO: 87.6 FL (ref 80–100)
PDW BLD-RTO: 15.3 % (ref 12.4–15.4)
PHOSPHORUS: 2.1 MG/DL (ref 2.5–4.9)
PLATELET # BLD: 278 K/UL (ref 135–450)
PMV BLD AUTO: 8.7 FL (ref 5–10.5)
POTASSIUM SERPL-SCNC: 4.8 MMOL/L (ref 3.5–5.1)
RBC # BLD: 5 M/UL (ref 4–5.2)
SODIUM BLD-SCNC: 136 MMOL/L (ref 136–145)
WBC # BLD: 10.3 K/UL (ref 4–11)

## 2021-09-16 PROCEDURE — 2580000003 HC RX 258: Performed by: NURSE PRACTITIONER

## 2021-09-16 PROCEDURE — 2580000003 HC RX 258: Performed by: SURGERY

## 2021-09-16 PROCEDURE — 6360000002 HC RX W HCPCS: Performed by: SURGERY

## 2021-09-16 PROCEDURE — 36415 COLL VENOUS BLD VENIPUNCTURE: CPT

## 2021-09-16 PROCEDURE — 6370000000 HC RX 637 (ALT 250 FOR IP): Performed by: NURSE PRACTITIONER

## 2021-09-16 PROCEDURE — C9113 INJ PANTOPRAZOLE SODIUM, VIA: HCPCS | Performed by: SURGERY

## 2021-09-16 PROCEDURE — 80069 RENAL FUNCTION PANEL: CPT

## 2021-09-16 PROCEDURE — 85027 COMPLETE CBC AUTOMATED: CPT

## 2021-09-16 PROCEDURE — 6360000002 HC RX W HCPCS: Performed by: NURSE PRACTITIONER

## 2021-09-16 RX ORDER — 0.9 % SODIUM CHLORIDE 0.9 %
1000 INTRAVENOUS SOLUTION INTRAVENOUS ONCE
Status: COMPLETED | OUTPATIENT
Start: 2021-09-16 | End: 2021-09-16

## 2021-09-16 RX ADMIN — SODIUM CHLORIDE 1000 ML: 9 INJECTION, SOLUTION INTRAVENOUS at 08:48

## 2021-09-16 RX ADMIN — PANTOPRAZOLE SODIUM 40 MG: 40 INJECTION, POWDER, FOR SOLUTION INTRAVENOUS at 08:41

## 2021-09-16 RX ADMIN — Medication 10 ML: at 08:41

## 2021-09-16 RX ADMIN — METOCLOPRAMIDE HYDROCHLORIDE 10 MG: 5 INJECTION INTRAMUSCULAR; INTRAVENOUS at 03:00

## 2021-09-16 RX ADMIN — ENOXAPARIN SODIUM 40 MG: 40 INJECTION SUBCUTANEOUS at 08:41

## 2021-09-16 NOTE — PROGRESS NOTES
Surgery Daily Progress Note  Jennifer Garcia  CC: Morbid Obesity  Subjective :  Nausea and pain improved. Taking in more liquids but still feels like it is difficult. Regrets having had surgery. States she had no idea she would \"feel like this\". Objective    Infusions:   sodium chloride      sodium chloride 150 mL/hr at 09/14/21 1108        I/O:I/O last 3 completed shifts:   In: 150 [P.O.:150]  Out: 900 [Urine:900]           Wt Readings from Last 1 Encounters:   09/14/21 (!) 358 lb (162.4 kg)                 LABS:    Recent Labs     09/15/21  0530 09/16/21  0521   WBC 14.1* 10.3   HGB 15.4 14.3   HCT 44.8 43.8   MCV 85.5 87.6    278          Exam:BP (!) 157/95   Pulse 88   Temp 98.3 °F (36.8 °C) (Oral)   Resp 18   Ht 5' 9\" (1.753 m)   Wt (!) 358 lb (162.4 kg)   LMP 09/04/2021   SpO2 96%   BMI 52.87 kg/m²   General appearance: alert, appears stated age and cooperative  Lungs: clear to auscultation bilaterally  Heart: regular rate and rhythm, S1, S2  Abdomen: soft, appropriately-tender; bowel sounds quiet  Trocar sites well approx      ASSESSMENT/PLAN: Pt. is a 33 y.o. female s/p Robotic Assisted Laparoscopic Sleeve Gastrectomy POD# 2  Emotional support provided  Encourage patient to be more active  Encourage IS  Continue Bariatric Clears  Give one liter bolus before discharge  Reinforced diet  Scripts filled and received    Home today    D/W Dr. Maria Elena Butt, APRN - CNP 9/16/2021 6:47 AM

## 2021-09-16 NOTE — PROGRESS NOTES
Pt remained A&Ox4 and VSS. Surgical sites clean, dry, intact. Pt remained ambulatory and voiding freely. IVF bolus given per orders. Discharge order acknowledged and IV removed. Pt received discharge instructions and education at bedside.  Pt escorted off unit and discharged around 1100    Electronically signed by Hanna Singh RN on 9/16/2021 at 11:49 AM

## 2021-09-16 NOTE — PROGRESS NOTES
Pt alert and oriented. VSS. Pt voiding adequately. Tolerating bariatric diet. IVF infusing. Rafia given for pain. Lap sites CDI. Call light within reach.  Will continue to monitor

## 2021-09-16 NOTE — DISCHARGE SUMMARY
Patient ID:  Neeta Bassett  [id number]  72 y.o.  1990    Admit date: 9/14/2021    Discharge date and time: 9/16/21    Admitting Physician: Laureen Castillo DO     Discharge Physician: same    Admission Diagnoses: Morbid obesity ([facility] [E66.01]  Obstructive sleep apnea [G47.33]  Morbid obesity with BMI of 50.0-59.9, adult ([facility] [E66.01, Z68.43]    Discharge Diagnoses: same    Admission Condition: fair    Discharged Condition: stable    Indication for Admission: Surgery: Robotic assisted Laparoscopic Sleeve Gastrectomy, IV hydration, monitoring, pain and nausea management    Hospital Course: 33 y.o. female admitted with morbid obesity who underwent Robotic assisted laparoscopic sleeve gastrectomy. Surgery was uneventful and she was admitted to bariatric post-operative surgical floor in stable condition for IV hydration, monitoring and pain and nausea management. The first day she struggled with oral intake and pain control. The following morning the pain was tolerable on po pain medication and was taking adequate po. She was discharged in stable condition. Treatments: IV hydration        Disposition: home    Patient Instructions: Activity: activity as tolerated and no driving while on analgesics  Diet: clear liquids  Wound Care: as directed    Follow-up with Dr. Ava Orozco in two weeks.         Signed:  LISA Dias CNP  9/16/2021  9:10 AM

## 2021-09-16 NOTE — CARE COORDINATION
Case Management Assessment            Discharge Note                    Date / Time of Note: 9/16/2021 9:26 AM                  Discharge Note Completed by: Christine Kaur RN    Patient Name: Osmin Nieto   YOB: 1990  Diagnosis: Morbid obesity ([facility] [E66.01]  Obstructive sleep apnea [G47.33]  Morbid obesity with BMI of 50.0-59.9, adult ([facility] [E66.01, Z68.43]   Date / Time: 9/14/2021  5:28 AM    Current PCP: Josh Velasquez MD  Clinic patient: No    Hospitalization in the last 30 days: No    Advance Directives:  Code Status: Full Code  [place] DNR form completed and on chart: Not Indicated    Financial:  Payor: [employer] [address] / Plan: Aly Reyes PPO / Product Type: *No Product type* /      Pharmacy:    [address] [phone] [facility] [phone]  [address]  Phone: [phone] Fax: [phone]      Assistance purchasing medications?:    Assistance provided by Case Management: None at this time    Does patient want to participate in local refill/ meds to beds program?:      Meds To Beds General Rules:  1. Can ONLY be done Monday- Friday between 8:30am-5pm  2. Prescription(s) must be in pharmacy by 3pm to be filled same day  3. Copy of patient's insurance/ prescription drug card and patient face sheet must be sent along with the prescription(s)  4. Cost of Rx cannot be added to hospital bill. If financial assistance is needed, please contact unit  or ;  or  CANNOT provide pharmacy voucher for patients co-pays  5.  Patients can then  the prescription on their way out of the hospital at discharge, or pharmacy can deliver to the bedside if staff is available. (payment due at time of pick-up or delivery - cash, check, or card accepted)     Able to afford home medications/ co-pay costs: Yes    ADLS:  Current PT AM-PAC Score:   /24  Current OT AM-PAC Score:   /24      DISCHARGE Disposition: Home- No Services Needed    LOC at discharge: Not Applicable  RAI Completed: Not Indicated    Notification completed in HENS/PAS?:  Not Applicable    IMM Completed:   Not Indicated    Transportation:  Transportation PLAN for discharge: family   Mode of Transport: [address] ordered at discharge: Not [address]: Not Applicable  Orders faxed: No    Durable Medical Equipment:  DME Provider: none  Equipment obtained during hospitalization:     Home Oxygen and Respiratory Equipment:  Oxygen needed at discharge?: Not [address]: Not Applicable  Portable tank available for discharge?: Not Indicated    Dialysis:  Dialysis patient: No    Dialysis Center:  Not Applicable      Additional CM Notes: Pt from home no needs at DC will follow up OP with Surgical team.     The Plan for Transition of Care is related to the following treatment goals of Morbid obesity ([facility] [E66.01]  Obstructive sleep apnea [G47.33]  Morbid obesity with BMI of 50.0-59.9, adult ([facility] [E66.01, Z68.43]    The Patient and/or patient representative Irena Duarte and her family were provided with a choice of provider and agrees with the discharge plan Yes    Freedom of choice list was provided with basic dialogue that supports the patient's individualized plan of care/goals and shares the quality data associated with the providers.  Not Indicated    Care Transitions patient: Yes    Osvaldo Watters RN  [facility]  Case Management Department  Ph: [phone]  Fax: [phone]

## 2021-09-17 ENCOUNTER — CARE COORDINATION (OUTPATIENT)
Dept: CASE MANAGEMENT | Age: 31
End: 2021-09-17

## 2021-09-17 NOTE — CARE COORDINATION
[facility] Transitions Initial Follow Up Call    Call within 2 business days of discharge: yes    Patient:  James Kirkland Patient :  1990  MRN:  [id number]   Reason for Admission:  gastrectomy  Discharge Date:  21  RARS: 8    CTC attempt to reach Pt regarding recent hospital discharge. CTC left voice recording with call back number requesting a call back. Follow up appointments:    Future Appointments   Date Time Provider Evelyn Weathers   2021 10:30 AM DO RADHA [facility]       NIKHIL GarzaN, RN  Care Transition Coordinator  Contact Number:  [phone]

## 2021-09-20 ENCOUNTER — TELEPHONE (OUTPATIENT)
Dept: BARIATRICS/WEIGHT MGMT | Age: 31
End: 2021-09-20

## 2021-09-20 ENCOUNTER — CARE COORDINATION (OUTPATIENT)
Dept: CASE MANAGEMENT | Age: 31
End: 2021-09-20

## 2021-09-20 NOTE — TELEPHONE ENCOUNTER
Pt is s/p sleeve 9/14/21 is having difficulty getting fluids/protein in. Yesterday was first day she was able to get a whole bottle of water in. She is trying has some nausea and knows she needs the fluids. Has not been able to get down a whole protein shake either.    Please call

## 2021-09-20 NOTE — CARE COORDINATION
[facility] Transitions Initial Follow Up Call    Call within 2 business days of discharge: Yes    Patient:  yTe Haley Patient :  1990  MRN:  [id number]   Reason for Admission:  gastrectomy  Discharge Date:  21  RARS: 8    CTC attempt to reach Pt regarding recent hospital discharge. CTC left voice recording with call back number requesting a call back. Follow up appointments:    Future Appointments   Date Time Provider Evelyn Weathers   2021 10:30 AM DO RADHA She[facility]       Collette Bark V. Gable Factor, BSN, RN  Care Transition Coordinator  Contact Number:  [phone]

## 2021-09-20 NOTE — TELEPHONE ENCOUNTER
Pt called again. Pt had questions about medication and vitamins. Pt seemed confused on when she should start taking vitamins and how long to crush medication for.

## 2021-09-20 NOTE — TELEPHONE ENCOUNTER
Returned pt call. She reports that sipping is getting frustrating because it takes a long time although she has started to find things that work for her to increase her intake like freezing her drinks and then consuming it like a slushy. Pt has consumed 16 oz water so far today by doing this. She reported no dark/infrequent urine or light-headedness at this time. I encouraged pt to work on reaching 48-64 oz over the next couple days before getting in protein shakes or pureed foods. We also discussed chewing/crushing vitamins starting today and continuing to crush other meds besides prilosec until 2 weeks post op visit. Reviewed pt would be getting a 1 week post op call within a few days to check on improved fluid status. No further concerns at this time.

## 2021-09-21 ENCOUNTER — TELEPHONE (OUTPATIENT)
Dept: BARIATRICS/WEIGHT MGMT | Age: 31
End: 2021-09-21

## 2021-09-21 DIAGNOSIS — E86.0 DEHYDRATION: Primary | ICD-10-CM

## 2021-09-22 ENCOUNTER — HOSPITAL ENCOUNTER (OUTPATIENT)
Dept: ONCOLOGY | Age: 31
Setting detail: INFUSION SERIES
Discharge: HOME | End: 2021-09-22
Payer: COMMERCIAL

## 2021-09-22 VITALS
OXYGEN SATURATION: 97 % | DIASTOLIC BLOOD PRESSURE: 75 MMHG | SYSTOLIC BLOOD PRESSURE: 104 MMHG | TEMPERATURE: 98.5 F | RESPIRATION RATE: 17 BRPM | HEART RATE: 80 BPM

## 2021-09-22 DIAGNOSIS — E86.0 DEHYDRATION: Primary | ICD-10-CM

## 2021-09-22 DIAGNOSIS — E86.0 DEHYDRATION: ICD-10-CM

## 2021-09-22 PROCEDURE — 6360000002 HC RX W HCPCS: Performed by: SURGERY

## 2021-09-22 PROCEDURE — 96360 HYDRATION IV INFUSION INIT: CPT

## 2021-09-22 PROCEDURE — 96365 THER/PROPH/DIAG IV INF INIT: CPT

## 2021-09-22 PROCEDURE — 96366 THER/PROPH/DIAG IV INF ADDON: CPT

## 2021-09-22 PROCEDURE — 2500000003 HC RX 250 WO HCPCS: Performed by: SURGERY

## 2021-09-22 PROCEDURE — 96361 HYDRATE IV INFUSION ADD-ON: CPT

## 2021-09-22 PROCEDURE — 2580000003 HC RX 258: Performed by: SURGERY

## 2021-09-22 RX ORDER — 0.9 % SODIUM CHLORIDE 0.9 %
1000 INTRAVENOUS SOLUTION INTRAVENOUS ONCE
Status: COMPLETED | OUTPATIENT
Start: 2021-09-22 | End: 2021-09-22

## 2021-09-22 RX ORDER — 0.9 % SODIUM CHLORIDE 0.9 %
1000 INTRAVENOUS SOLUTION INTRAVENOUS ONCE
Status: CANCELLED
Start: 2021-09-22 | End: 2021-09-22

## 2021-09-22 RX ADMIN — SODIUM CHLORIDE 1000 ML: 9 INJECTION, SOLUTION INTRAVENOUS at 14:20

## 2021-09-22 RX ADMIN — FOLIC ACID: 5 INJECTION, SOLUTION INTRAMUSCULAR; INTRAVENOUS; SUBCUTANEOUS at 15:11

## 2021-09-22 NOTE — TELEPHONE ENCOUNTER
LVM for patient to contact the office to be set up for IVF. Thank You.
Orders placed. Faxed to [address] [facility]. Spoke with Ravindra Medley. Appt sched for 1:30pm today. Pt to be notified.
Patient notified
Patient would like to have fluids done at [facility]. Patient was advised that MA will call her tomorrow with details on when and where to go for fluids.
Thank you Tejal Stovall with the levsin around the clock  Let's just get her set up for IV fluids for tomorrow (1 liter normal saline and 1 liter normal saline with banana bag)  I think it will help her get over the hump
not meet fluid goal today. After meeting fluid goal she may begin to add back in protein shakes. Once meeting fluid and protein shake goal, she can start adding in pureed meals. We discussed using some protein hooper while she is solely focused on fluids (such as [id number] and Gatorade zero).

## 2021-09-22 NOTE — PROGRESS NOTES
Pt seen and assessed at [address] [facility] for 1 liter NS and rally infusion per orders from Dr. Aryan Moncada. Infused per [facility] policy. Monitoring completed for infusion reactions - see flowsheet. Pt tolerated infusion well and without incident. Pt verbalizes understanding of discharge instructions. Discharged ambulatory to home.   Warden Dakotah RN

## 2021-09-29 ENCOUNTER — HOSPITAL ENCOUNTER (OUTPATIENT)
Dept: ONCOLOGY | Age: 31
Setting detail: INFUSION SERIES
Discharge: HOME | End: 2021-09-29
Payer: COMMERCIAL

## 2021-09-29 ENCOUNTER — OFFICE VISIT (OUTPATIENT)
Dept: BARIATRICS/WEIGHT MGMT | Age: 31
End: 2021-09-29
Payer: COMMERCIAL

## 2021-09-29 VITALS
SYSTOLIC BLOOD PRESSURE: 131 MMHG | DIASTOLIC BLOOD PRESSURE: 99 MMHG | WEIGHT: 293 LBS | BODY MASS INDEX: 41.95 KG/M2 | HEART RATE: 72 BPM | HEIGHT: 70 IN

## 2021-09-29 VITALS
SYSTOLIC BLOOD PRESSURE: 118 MMHG | RESPIRATION RATE: 18 BRPM | TEMPERATURE: 98.1 F | OXYGEN SATURATION: 94 % | HEART RATE: 77 BPM | DIASTOLIC BLOOD PRESSURE: 80 MMHG

## 2021-09-29 DIAGNOSIS — E86.0 DEHYDRATION: Primary | ICD-10-CM

## 2021-09-29 DIAGNOSIS — Z98.84 STATUS POST LAPAROSCOPIC SLEEVE GASTRECTOMY: Primary | ICD-10-CM

## 2021-09-29 PROCEDURE — 2580000003 HC RX 258: Performed by: SURGERY

## 2021-09-29 PROCEDURE — 6360000002 HC RX W HCPCS: Performed by: SURGERY

## 2021-09-29 PROCEDURE — 96365 THER/PROPH/DIAG IV INF INIT: CPT

## 2021-09-29 PROCEDURE — 99024 POSTOP FOLLOW-UP VISIT: CPT | Performed by: SURGERY

## 2021-09-29 PROCEDURE — 96361 HYDRATE IV INFUSION ADD-ON: CPT

## 2021-09-29 PROCEDURE — 2500000003 HC RX 250 WO HCPCS: Performed by: SURGERY

## 2021-09-29 PROCEDURE — 96366 THER/PROPH/DIAG IV INF ADDON: CPT

## 2021-09-29 PROCEDURE — 96360 HYDRATION IV INFUSION INIT: CPT

## 2021-09-29 RX ORDER — SODIUM CHLORIDE 9 MG/ML
25 INJECTION, SOLUTION INTRAVENOUS PRN
Status: CANCELLED | OUTPATIENT
Start: 2021-09-29

## 2021-09-29 RX ORDER — SODIUM CHLORIDE 9 MG/ML
25 INJECTION, SOLUTION INTRAVENOUS PRN
Status: DISCONTINUED | OUTPATIENT
Start: 2021-09-29 | End: 2021-09-30 | Stop reason: HOSPADM

## 2021-09-29 RX ORDER — HEPARIN SODIUM (PORCINE) LOCK FLUSH IV SOLN 100 UNIT/ML 100 UNIT/ML
500 SOLUTION INTRAVENOUS PRN
Status: DISCONTINUED | OUTPATIENT
Start: 2021-09-29 | End: 2021-09-30 | Stop reason: HOSPADM

## 2021-09-29 RX ORDER — SODIUM CHLORIDE 0.9 % (FLUSH) 0.9 %
5-40 SYRINGE (ML) INJECTION PRN
Status: CANCELLED | OUTPATIENT
Start: 2021-09-29

## 2021-09-29 RX ORDER — SODIUM CHLORIDE 0.9 % (FLUSH) 0.9 %
5-40 SYRINGE (ML) INJECTION PRN
Status: DISCONTINUED | OUTPATIENT
Start: 2021-09-29 | End: 2021-09-30 | Stop reason: HOSPADM

## 2021-09-29 RX ORDER — HEPARIN SODIUM (PORCINE) LOCK FLUSH IV SOLN 100 UNIT/ML 100 UNIT/ML
500 SOLUTION INTRAVENOUS PRN
Status: CANCELLED | OUTPATIENT
Start: 2021-09-29

## 2021-09-29 RX ADMIN — THIAMINE HYDROCHLORIDE: 100 INJECTION, SOLUTION INTRAMUSCULAR; INTRAVENOUS at 13:07

## 2021-09-29 NOTE — PROGRESS NOTES
Pt seen and assessed at [address] [facility] for 1 liter NS and rally infusion per orders from Dr. Lorri Kirkland. Infused per [facility] policy. Monitoring completed for infusion reactions - see flowsheet. Pt tolerated infusion well and without incident. Pt verbalizes understanding of discharge instructions. Discharged ambulatory to home.  Electronically signed by Esthela Pinto RN on 9/29/2021 at 3:35 PM

## 2021-10-01 ENCOUNTER — HOSPITAL ENCOUNTER (OUTPATIENT)
Dept: ONCOLOGY | Age: 31
Setting detail: INFUSION SERIES
Discharge: HOME | End: 2021-10-01
Payer: COMMERCIAL

## 2021-10-01 PROCEDURE — 96360 HYDRATION IV INFUSION INIT: CPT

## 2021-10-01 PROCEDURE — 6360000002 HC RX W HCPCS: Performed by: SURGERY

## 2021-10-01 PROCEDURE — 2500000003 HC RX 250 WO HCPCS: Performed by: SURGERY

## 2021-10-01 PROCEDURE — 2580000003 HC RX 258: Performed by: SURGERY

## 2021-10-01 PROCEDURE — 96365 THER/PROPH/DIAG IV INF INIT: CPT

## 2021-10-01 PROCEDURE — 96366 THER/PROPH/DIAG IV INF ADDON: CPT

## 2021-10-01 RX ORDER — 0.9 % SODIUM CHLORIDE 0.9 %
1000 INTRAVENOUS SOLUTION INTRAVENOUS ONCE
Status: DISCONTINUED | OUTPATIENT
Start: 2021-10-04 | End: 2021-10-01

## 2021-10-01 RX ORDER — 0.9 % SODIUM CHLORIDE 0.9 %
1000 INTRAVENOUS SOLUTION INTRAVENOUS ONCE
Status: COMPLETED | OUTPATIENT
Start: 2021-10-01 | End: 2021-10-01

## 2021-10-01 RX ADMIN — THIAMINE HYDROCHLORIDE: 100 INJECTION, SOLUTION INTRAMUSCULAR; INTRAVENOUS at 13:00

## 2021-10-01 RX ADMIN — SODIUM CHLORIDE 1000 ML: 9 INJECTION, SOLUTION INTRAVENOUS at 12:18

## 2021-10-01 NOTE — PROGRESS NOTES
Pt seen and assessed at [facility] for 1 liter NS and rally infusion per orders from Dr. Abigail Goltz.  Infused per [facility] completed for infusion reactions - see flowsheet.  Pt tolerated infusion well and without incident.  Pt verbalizes understanding of discharge instructions.  Discharged ambulatory to home.  Electronically signed by Mehran Rodriguez RN on 10/1/2021 at 2:00 PM

## 2021-10-04 ENCOUNTER — HOSPITAL ENCOUNTER (OUTPATIENT)
Dept: ONCOLOGY | Age: 31
Setting detail: INFUSION SERIES
Discharge: HOME | End: 2021-10-04
Payer: COMMERCIAL

## 2021-10-04 VITALS
OXYGEN SATURATION: 99 % | RESPIRATION RATE: 18 BRPM | HEART RATE: 67 BPM | DIASTOLIC BLOOD PRESSURE: 93 MMHG | SYSTOLIC BLOOD PRESSURE: 142 MMHG | TEMPERATURE: 97.9 F

## 2021-10-04 DIAGNOSIS — E86.0 DEHYDRATION: Primary | ICD-10-CM

## 2021-10-04 PROCEDURE — 96361 HYDRATE IV INFUSION ADD-ON: CPT

## 2021-10-04 PROCEDURE — 96360 HYDRATION IV INFUSION INIT: CPT

## 2021-10-04 PROCEDURE — 6360000002 HC RX W HCPCS: Performed by: SURGERY

## 2021-10-04 PROCEDURE — 96365 THER/PROPH/DIAG IV INF INIT: CPT

## 2021-10-04 PROCEDURE — 2580000003 HC RX 258: Performed by: SURGERY

## 2021-10-04 PROCEDURE — 2500000003 HC RX 250 WO HCPCS: Performed by: SURGERY

## 2021-10-04 RX ORDER — SODIUM CHLORIDE 9 MG/ML
INJECTION, SOLUTION INTRAVENOUS ONCE
Status: CANCELLED
Start: 2021-10-04 | End: 2021-10-04

## 2021-10-04 RX ORDER — SODIUM CHLORIDE 9 MG/ML
INJECTION, SOLUTION INTRAVENOUS ONCE
Status: COMPLETED | OUTPATIENT
Start: 2021-10-04 | End: 2021-10-04

## 2021-10-04 RX ADMIN — THIAMINE HYDROCHLORIDE: 100 INJECTION, SOLUTION INTRAMUSCULAR; INTRAVENOUS at 12:38

## 2021-10-04 RX ADMIN — SODIUM CHLORIDE: 9 INJECTION, SOLUTION INTRAVENOUS at 12:10

## 2021-10-04 NOTE — PROGRESS NOTES
Pt seen and assessed at [address] [facility] for hydration with 1 liter of sodium chloride and a rally pack infusion per orders from Dr. Sudarshan Kirk. Infused per [facility] policy. Monitoring completed for infusion reactions - see flowsheet. Pt tolerated infusion well and without incident. Pt verbalizes understanding of discharge instructions. Discharged ambulatory to home.

## 2021-10-06 ENCOUNTER — OFFICE VISIT (OUTPATIENT)
Dept: BARIATRICS/WEIGHT MGMT | Age: 31
End: 2021-10-06

## 2021-10-06 VITALS
SYSTOLIC BLOOD PRESSURE: 108 MMHG | DIASTOLIC BLOOD PRESSURE: 81 MMHG | HEIGHT: 70 IN | WEIGHT: 293 LBS | BODY MASS INDEX: 41.95 KG/M2

## 2021-10-06 DIAGNOSIS — Z98.84 STATUS POST LAPAROSCOPIC SLEEVE GASTRECTOMY: Primary | ICD-10-CM

## 2021-10-06 PROCEDURE — 99024 POSTOP FOLLOW-UP VISIT: CPT | Performed by: SURGERY

## 2021-10-06 NOTE — PROGRESS NOTES
[facility]) Physicians   General & Laparoscopic Surgery  [employer]       HPI:     Kala Meza is a very pleasant 33 y.o. obese female , Body mass index is 50.19 kg/m². [place] And multiple medical problems who is presenting for bariatric follow up care. Kala Meza is s/p laparoscopic sleeve gastrectomy by me   Comes today to the clinic without any complaints. Patient denies any nausea, vomiting, fevers, chills, shortness of breath, chest pain, constipation or urinary symptoms. Denies any heartburn nor dysphagia. Patient is feeling very well, and is very active. Patient is very pleased with the weight loss and resolution of co-morbid conditions.         Past Medical History:   Diagnosis Date    Asthma     controlled    Generalized anxiety disorder     Moderate episode of recurrent major depressive disorder ([facility] 07/19/2018    Morbid obesity with BMI of 50.0-59.9, adult ([facility]     Unspecified vitamin D deficiency 04/13/2011     Past Surgical History:   Procedure Laterality Date    DILATION AND CURETTAGE OF UTERUS  04/19/2013    ENDOSCOPY, COLON, DIAGNOSTIC      Pt denies colonoscopy (9/14/21)    SLEEVE GASTRECTOMY N/A 9/14/2021    ROBOTIC ASSISTED LAPAROSCOPIC SLEEVE GASTRECTOMY performed by Wilson Purvis DO at [address] N/A 05/10/2021    EGD BIOPSY performed by Wilson Purvis DO at [address] History   Problem Relation Age of Onset    Hypertension Mother    [facility] Migraines Mother     Cancer Father     Hypertension Father     Elevated Lipids Father     Diabetes Father     Heart Attack Paternal Grandmother     Hypertension Paternal Grandmother     Rheum Arthritis Neg Hx     Osteoarthritis Neg Hx     Asthma Neg Hx     Breast Cancer Neg Hx     Heart Failure Neg Hx     High Cholesterol Neg Hx     Ovarian Cancer Neg Hx     Rashes/Skin Problems Neg Hx     Seizures Neg Hx     Stroke Neg Hx     Thyroid Disease Neg Hx      Social History Tobacco Use    Smoking status: Never Smoker    Smokeless tobacco: Never Used   Substance Use Topics    Alcohol use: Yes     Comment: rare     I counseled the patient on the importance of not smoking and risks of ETOH. No Known Allergies  Vitals:    10/06/21 1010   BP: 108/81   Weight: (!) 344 lb 12.8 oz (156.4 kg)   Height: 5' 9.5\" (1.765 m)       Body mass index is 50.19 kg/m². Current Outpatient Medications:     Hyoscyamine Sulfate SL (LEVSIN/SL) 0.125 MG SUBL, Place 1 tablet under the tongue every 6 hours as needed (spasm), Disp: 30 each, Rfl: 0    omeprazole (PRILOSEC) 20 MG delayed release capsule, Take 1 capsule by mouth Daily, Disp: 30 capsule, Rfl: 3    ondansetron (ZOFRAN) 4 MG tablet, Take 1 tablet by mouth every 6 hours as needed for Nausea or Vomiting, Disp: 30 tablet, Rfl: 0    etonogestrel-ethinyl estradiol (ELURYNG) 0.12-0.015 MG/24HR vaginal ring, Place 1 each vaginally See Admin Instructions, Disp: 3 each, Rfl: 1      Review of Systems - History obtained from the patient  General ROS: negative  Psychological ROS: negative  Hematological and Lymphatic ROS: negative  Endocrine ROS: negative  Respiratory ROS: negative  Cardiovascular ROS: negative  Gastrointestinal ROS:negative  Genito-Urinary ROS: negative  Musculoskeletal ROS: negative   Skin ROS: negative    Physical Exam   Vitals Reviewed   Constitutional: Patient is oriented to person, place, and time. Patient appears well-developed and well-nourished. Patient is active and cooperative. Non-toxic appearance. No distress. Neck: Trachea normal and normal range of motion. No JVD present. Pulmonary/Chest: Effort normal. No accessory muscle usage or stridor. No apnea. No respiratory distress. Cardiovascular: Normal rate and no JVD. Abdominal: Normal appearance. Patient exhibits no distension. Abdomen is soft, obese, non tender. Musculoskeletal: Normal range of motion. Patient exhibits no edema.    Neurological: Patient is alert and oriented to person, place, and time. Patient has normal strength. GCS eye subscore is 4. GCS verbal subscore is 5. GCS motor subscore is 6. Skin: Skin is warm and dry. No abrasion and no rash noted. Patient is not diaphoretic. No cyanosis or erythema. Psychiatric: Patient has a normal mood and affect. Speech is normal and behavior is normal. Cognition and memory are normal.         A/P     Cuca Pickett is 33 y.o. female , now with Body mass index is 50.19 kg/m². s/p Sleeve gastrectomy, has lost 5 lbs since last visit, total of 32 lbs weight loss. The patient underwent dietary counseling with registered dietician. I have reviewed, discussed and agree with the dietary plan. Patient is trying hard to keep good dietary and behavior modifications. Patient is monitoring portion sizes, food choices and liquid calories. Patient is trying to exercise regularly. Patient pleased with the surgery outcomes. We discussed how her weight affects her overall health including:  Casandra Harding was seen today for bariatrics post op follow up. Diagnoses and all orders for this visit:    Status post laparoscopic sleeve gastrectomy       and importance of weight loss to alleviate those co morbid conditions. I encouraged the patient to continue exercise and keeping healthy eating habits. Also counseled the patient extensively on post surgery care. RTC in 3 weeks  Diet and Exercise      Patient advised that its their responsibility to follow up for studies and/or labs ordered today.

## 2021-10-17 NOTE — PROGRESS NOTES
Tobacco Use    Smoking status: Never Smoker    Smokeless tobacco: Never Used   Substance Use Topics    Alcohol use: Yes     Comment: rare     I counseled the patient on the importance of not smoking and risks of ETOH. No Known Allergies  Vitals:    09/29/21 1034   BP: (!) 131/99   Pulse: 72   Weight: (!) 349 lb 4 oz (158.4 kg)   Height: 5' 9.5\" (1.765 m)       Body mass index is 50.84 kg/m². Current Outpatient Medications:     Hyoscyamine Sulfate SL (LEVSIN/SL) 0.125 MG SUBL, Place 1 tablet under the tongue every 6 hours as needed (spasm), Disp: 30 each, Rfl: 0    omeprazole (PRILOSEC) 20 MG delayed release capsule, Take 1 capsule by mouth Daily, Disp: 30 capsule, Rfl: 3    ondansetron (ZOFRAN) 4 MG tablet, Take 1 tablet by mouth every 6 hours as needed for Nausea or Vomiting, Disp: 30 tablet, Rfl: 0    etonogestrel-ethinyl estradiol (ELURYNG) 0.12-0.015 MG/24HR vaginal ring, Place 1 each vaginally See Admin Instructions, Disp: 3 each, Rfl: 1      Review of Systems - History obtained from the patient  General ROS: negative  Psychological ROS: negative  Hematological and Lymphatic ROS: negative  Endocrine ROS: negative  Respiratory ROS: negative  Cardiovascular ROS: negative  Gastrointestinal ROS:negative  Genito-Urinary ROS: negative  Musculoskeletal ROS: negative   Skin ROS: negative    Physical Exam   Vitals Reviewed   Constitutional: Patient is oriented to person, place, and time. Patient appears well-developed and well-nourished. Patient is active and cooperative. Non-toxic appearance. No distress. Neck: Trachea normal and normal range of motion. No JVD present. Pulmonary/Chest: Effort normal. No accessory muscle usage or stridor. No apnea. No respiratory distress. Cardiovascular: Normal rate and no JVD. Abdominal: Normal appearance. Patient exhibits no distension. Abdomen is soft, obese, non tender. Musculoskeletal: Normal range of motion. Patient exhibits no edema.    Neurological: Patient is alert and oriented to person, place, and time. Patient has normal strength. GCS eye subscore is 4. GCS verbal subscore is 5. GCS motor subscore is 6. Skin: Skin is warm and dry. No abrasion and no rash noted. Patient is not diaphoretic. No cyanosis or erythema. Psychiatric: Patient has a normal mood and affect. Speech is normal and behavior is normal. Cognition and memory are normal.         A/P     Nancy Lafleur is 33 y.o. female , now with Body mass index is 50.84 kg/m². s/p Sleeve gastrectomy, has lost 21 lbs since last visit, total of 27 lbs weight loss. The patient underwent dietary counseling with registered dietician. I have reviewed, discussed and agree with the dietary plan. Patient is trying hard to keep good dietary and behavior modifications. Patient is monitoring portion sizes, food choices and liquid calories. Patient is trying to exercise regularly. Patient pleased with the surgery outcomes. We discussed how her weight affects her overall health including:  Vern Bowie was seen today for bariatrics post op follow up. Diagnoses and all orders for this visit:    Status post laparoscopic sleeve gastrectomy       and importance of weight loss to alleviate those co morbid conditions. I encouraged the patient to continue exercise and keeping healthy eating habits. Also counseled the patient extensively on post surgery care. RTC in 1 month  Diet and Exercise      Patient advised that its their responsibility to follow up for studies and/or labs ordered today.

## 2021-10-27 ENCOUNTER — OFFICE VISIT (OUTPATIENT)
Dept: BARIATRICS/WEIGHT MGMT | Age: 31
End: 2021-10-27

## 2021-10-27 VITALS
WEIGHT: 293 LBS | BODY MASS INDEX: 41.95 KG/M2 | HEIGHT: 70 IN | DIASTOLIC BLOOD PRESSURE: 96 MMHG | SYSTOLIC BLOOD PRESSURE: 127 MMHG

## 2021-10-27 DIAGNOSIS — Z98.84 STATUS POST LAPAROSCOPIC SLEEVE GASTRECTOMY: Primary | ICD-10-CM

## 2021-10-27 PROCEDURE — 99024 POSTOP FOLLOW-UP VISIT: CPT | Performed by: SURGERY

## 2021-10-27 NOTE — PROGRESS NOTES
[facility]   General & Laparoscopic Surgery  [employer] [facility] [employer]       HPI:     Eusebia Pablo is a very pleasant 33 y.o. obese female , Body mass index is 49.37 kg/m². Hardik Cambridge And multiple medical problems who is presenting for bariatric follow up care. Eusebia Pablo is s/p laparoscopic sleeve gastrectomy by me   Comes today to the clinic without any complaints. Patient denies any nausea, vomiting, fevers, chills, shortness of breath, chest pain, constipation or urinary symptoms. Denies any heartburn nor dysphagia. Patient is feeling very well, and is very active. Patient is very pleased with the weight loss and resolution of co-morbid conditions.         Past Medical History:   Diagnosis Date    Asthma     controlled    Generalized anxiety disorder     Moderate episode of recurrent major depressive disorder ([facility] [address] 07/19/2018    Morbid obesity with BMI of 50.0-59.9, adult ([facility] [address]     Unspecified vitamin D deficiency 04/13/2011     Past Surgical History:   Procedure Laterality Date    DILATION AND CURETTAGE OF UTERUS  04/19/2013    ENDOSCOPY, COLON, DIAGNOSTIC      Pt denies colonoscopy (9/14/21)    SLEEVE GASTRECTOMY N/A 9/14/2021    ROBOTIC ASSISTED LAPAROSCOPIC SLEEVE GASTRECTOMY performed by Karen Yang DO at [address] [facility] N/A 05/10/2021    EGD BIOPSY performed by Karen Yang DO at [address] History   Problem Relation Age of Onset    Hypertension Mother    [facility] Migraines Mother     Cancer Father     Hypertension Father     Elevated Lipids Father     Diabetes Father     Heart Attack Paternal Grandmother     Hypertension Paternal Grandmother     Rheum Arthritis Neg Hx     Osteoarthritis Neg Hx     Asthma Neg Hx     Breast Cancer Neg Hx     Heart Failure Neg Hx     High Cholesterol Neg Hx     Ovarian Cancer Neg Hx     Rashes/Skin Problems Neg Hx     Seizures Neg Hx     Stroke Neg Hx     Thyroid Disease Neg Hx      Social History Tobacco Use    Smoking status: Never Smoker    Smokeless tobacco: Never Used   Substance Use Topics    Alcohol use: Yes     Comment: rare     I counseled the patient on the importance of not smoking and risks of ETOH. No Known Allergies  Vitals:    10/27/21 0833   BP: (!) 127/96   Weight: (!) 339 lb 3.2 oz (153.9 kg)   Height: 5' 9.5\" (1.765 m)       Body mass index is 49.37 kg/m². Current Outpatient Medications:     Hyoscyamine Sulfate SL (LEVSIN/SL) 0.125 MG SUBL, Place 1 tablet under the tongue every 6 hours as needed (spasm), Disp: 30 each, Rfl: 0    omeprazole (PRILOSEC) 20 MG delayed release capsule, Take 1 capsule by mouth Daily, Disp: 30 capsule, Rfl: 3    ondansetron (ZOFRAN) 4 MG tablet, Take 1 tablet by mouth every 6 hours as needed for Nausea or Vomiting, Disp: 30 tablet, Rfl: 0    etonogestrel-ethinyl estradiol (ELURYNG) 0.12-0.015 MG/24HR vaginal ring, Place 1 each vaginally See Admin Instructions, Disp: 3 each, Rfl: 1      Review of Systems - History obtained from the patient  General ROS: negative  Psychological ROS: negative  Hematological and Lymphatic ROS: negative  Endocrine ROS: negative  Respiratory ROS: negative  Cardiovascular ROS: negative  Gastrointestinal ROS:negative  Genito-Urinary ROS: negative  Musculoskeletal ROS: negative   Skin ROS: negative    Physical Exam   Vitals Reviewed   Constitutional: Patient is oriented to person, place, and time. Patient appears well-developed and well-nourished. Patient is active and cooperative. Non-toxic appearance. No distress. Neck: Trachea normal and normal range of motion. No JVD present. Pulmonary/Chest: Effort normal. No accessory muscle usage or stridor. No apnea. No respiratory distress. Cardiovascular: Normal rate and no JVD. Abdominal: Normal appearance. Patient exhibits no distension. Abdomen is soft, obese, non tender. Musculoskeletal: Normal range of motion. Patient exhibits no edema.    Neurological: Patient is alert and oriented to person, place, and time. Patient has normal strength. GCS eye subscore is 4. GCS verbal subscore is 5. GCS motor subscore is 6. Skin: Skin is warm and dry. No abrasion and no rash noted. Patient is not diaphoretic. No cyanosis or erythema. Psychiatric: Patient has a normal mood and affect. Speech is normal and behavior is normal. Cognition and memory are normal.         A/P     Kristine Ferrari is 33 y.o. female , now with Body mass index is 49.37 kg/m². s/p Sleeve gastrectomy, has lost lost 5 lbs since last visit, total of 37 lbs weight loss. The patient underwent dietary counseling with registered dietician. I have reviewed, discussed and agree with the dietary plan. Patient is trying hard to keep good dietary and behavior modifications. Patient is monitoring portion sizes, food choices and liquid calories. Patient is trying to exercise regularly. Patient pleased with the surgery outcomes. We discussed how her weight affects her overall health including:  Tonya was seen today for bariatrics post op follow up. Diagnoses and all orders for this visit:    Status post laparoscopic sleeve gastrectomy       and importance of weight loss to alleviate those co morbid conditions. I encouraged the patient to continue exercise and keeping healthy eating habits. Also counseled the patient extensively on post surgery care. RTC in 2 months  Diet and Exercise      Patient advised that its their responsibility to follow up for studies and/or labs ordered today.

## 2021-11-10 ENCOUNTER — TELEPHONE (OUTPATIENT)
Dept: BARIATRICS/WEIGHT MGMT | Age: 31
End: 2021-11-10

## 2021-11-10 NOTE — TELEPHONE ENCOUNTER
Patient is s/p sleeve 9/14/21    She is requesting her signed return to work letter e-mailed to her. [email or web address]    She misplaced the one previously given.

## 2021-11-15 NOTE — TELEPHONE ENCOUNTER
Spoke with pt to confirm her 5:30 am arrival for her 7:30 am surgery on 9/14/21. Pt states she has completed her pre op diet, her clear, liquid diet, and will be NPO after midnight. Melolabial Interpolation Flap Text: A decision was made to reconstruct the defect utilizing an interpolation axial flap and a staged reconstruction.  A telfa template was made of the defect.  This telfa template was then used to outline the melolabial interpolation flap.  The donor area for the pedicle flap was then injected with anesthesia.  The flap was excised through the skin and subcutaneous tissue down to the layer of the underlying musculature.  The pedicle flap was carefully excised within this deep plane to maintain its blood supply.  The edges of the donor site were undermined.   The donor site was closed in a primary fashion.  The pedicle was then rotated into position and sutured.  Once the tube was sutured into place, adequate blood supply was confirmed with blanching and refill.  The pedicle was then wrapped with xeroform gauze and dressed appropriately with a telfa and gauze bandage to ensure continued blood supply and protect the attached pedicle.

## 2021-12-22 ENCOUNTER — TELEPHONE (OUTPATIENT)
Dept: BARIATRICS/WEIGHT MGMT | Age: 31
End: 2021-12-22

## 2021-12-22 ENCOUNTER — OFFICE VISIT (OUTPATIENT)
Dept: BARIATRICS/WEIGHT MGMT | Age: 31
End: 2021-12-22
Payer: COMMERCIAL

## 2021-12-22 VITALS — WEIGHT: 293 LBS | BODY MASS INDEX: 41.95 KG/M2 | HEIGHT: 70 IN

## 2021-12-22 DIAGNOSIS — E66.01 MORBID OBESITY WITH BMI OF 45.0-49.9, ADULT (HCC): Primary | ICD-10-CM

## 2021-12-22 DIAGNOSIS — Z98.84 STATUS POST LAPAROSCOPIC SLEEVE GASTRECTOMY: ICD-10-CM

## 2021-12-22 PROCEDURE — 99213 OFFICE O/P EST LOW 20 MIN: CPT | Performed by: SURGERY

## 2021-12-22 RX ORDER — OMEPRAZOLE 20 MG/1
20 CAPSULE, DELAYED RELEASE ORAL
Qty: 90 CAPSULE | Refills: 1 | Status: SHIPPED | OUTPATIENT
Start: 2021-12-22

## 2021-12-22 NOTE — PROGRESS NOTES
[facility]) [facility]   General & Laparoscopic Surgery  [employer] [facility] [employer]       HPI:     Theo Desir is a very pleasant 33 y.o. obese female , Body mass index is 46.96 kg/m². Jenna Paul And multiple medical problems who is presenting for bariatric follow up care. Theo Desir is s/p laparoscopic sleeve gastrectomy by me   Comes today to the clinic without any complaints. Patient denies any nausea, vomiting, fevers, chills, shortness of breath, chest pain, constipation or urinary symptoms. Denies any heartburn nor dysphagia. Patient is feeling very well, and is very active. Patient is very pleased with the weight loss and resolution of co-morbid conditions.         Past Medical History:   Diagnosis Date    Asthma     controlled    Generalized anxiety disorder     Moderate episode of recurrent major depressive disorder ([facility] [address] 07/19/2018    Morbid obesity with BMI of 50.0-59.9, adult ([facility] [address]     Unspecified vitamin D deficiency 04/13/2011     Past Surgical History:   Procedure Laterality Date    DILATION AND CURETTAGE OF UTERUS  04/19/2013    ENDOSCOPY, COLON, DIAGNOSTIC      Pt denies colonoscopy (9/14/21)    SLEEVE GASTRECTOMY N/A 9/14/2021    ROBOTIC ASSISTED LAPAROSCOPIC SLEEVE GASTRECTOMY performed by Varsha Marlow DO at [address] [facility] N/A 05/10/2021    EGD BIOPSY performed by Varsha Marlow DO at [address] History   Problem Relation Age of Onset    Hypertension Mother    [facility] Migraines Mother     Cancer Father     Hypertension Father     Elevated Lipids Father     Diabetes Father     Heart Attack Paternal Grandmother     Hypertension Paternal Grandmother     Rheum Arthritis Neg Hx     Osteoarthritis Neg Hx     Asthma Neg Hx     Breast Cancer Neg Hx     Heart Failure Neg Hx     High Cholesterol Neg Hx     Ovarian Cancer Neg Hx     Rashes/Skin Problems Neg Hx     Seizures Neg Hx     Stroke Neg Hx     Thyroid Disease Neg Hx      Social History Tobacco Use    Smoking status: Never Smoker    Smokeless tobacco: Never Used   Substance Use Topics    Alcohol use: Yes     Comment: rare     I counseled the patient on the importance of not smoking and risks of ETOH. No Known Allergies  Vitals:    12/22/21 0836   Weight: (!) 322 lb 9.6 oz (146.3 kg)   Height: 5' 9.5\" (1.765 m)       Body mass index is 46.96 kg/m². Current Outpatient Medications:     omeprazole (PRILOSEC) 20 MG delayed release capsule, Take 1 capsule by mouth every morning (before breakfast), Disp: 90 capsule, Rfl: 1    Hyoscyamine Sulfate SL (LEVSIN/SL) 0.125 MG SUBL, Place 1 tablet under the tongue every 6 hours as needed (spasm), Disp: 30 each, Rfl: 0    omeprazole (PRILOSEC) 20 MG delayed release capsule, Take 1 capsule by mouth Daily, Disp: 30 capsule, Rfl: 3    ondansetron (ZOFRAN) 4 MG tablet, Take 1 tablet by mouth every 6 hours as needed for Nausea or Vomiting, Disp: 30 tablet, Rfl: 0    etonogestrel-ethinyl estradiol (ELURYNG) 0.12-0.015 MG/24HR vaginal ring, Place 1 each vaginally See Admin Instructions, Disp: 3 each, Rfl: 1      Review of Systems - History obtained from the patient  General ROS: negative  Psychological ROS: negative  Hematological and Lymphatic ROS: negative  Endocrine ROS: negative  Respiratory ROS: negative  Cardiovascular ROS: negative  Gastrointestinal ROS:negative  Genito-Urinary ROS: negative  Musculoskeletal ROS: negative   Skin ROS: negative    Physical Exam   Vitals Reviewed   Constitutional: Patient is oriented to person, place, and time. Patient appears well-developed and well-nourished. Patient is active and cooperative. Non-toxic appearance. No distress. Neck: Trachea normal and normal range of motion. No JVD present. Pulmonary/Chest: Effort normal. No accessory muscle usage or stridor. No apnea. No respiratory distress. Cardiovascular: Normal rate and no JVD. Abdominal: Normal appearance. Patient exhibits no distension.  Abdomen is soft, obese, non tender. Musculoskeletal: Normal range of motion. Patient exhibits no edema. Neurological: Patient is alert and oriented to person, place, and time. Patient has normal strength. GCS eye subscore is 4. GCS verbal subscore is 5. GCS motor subscore is 6. Skin: Skin is warm and dry. No abrasion and no rash noted. Patient is not diaphoretic. No cyanosis or erythema. Psychiatric: Patient has a normal mood and affect. Speech is normal and behavior is normal. Cognition and memory are normal.         A/P     Susan Lund is 33 y.o. female , now with Body mass index is 46.96 kg/m². s/p Sleeve gastrectomy, has lost 17 lbs since last visit, total of 54 lbs weight loss. The patient underwent dietary counseling with registered dietician. I have reviewed, discussed and agree with the dietary plan. Patient is trying hard to keep good dietary and behavior modifications. Patient is monitoring portion sizes, food choices and liquid calories. Patient is trying to exercise regularly. Patient pleased with the surgery outcomes. We discussed how her weight affects her overall health including:  Christina Kirk was seen today for bariatrics post op follow up. Diagnoses and all orders for this visit:    Morbid obesity with BMI of 45.0-49.9, adult ([facility] [address]    Status post laparoscopic sleeve gastrectomy    Other orders  -     omeprazole (PRILOSEC) 20 MG delayed release capsule; Take 1 capsule by mouth every morning (before breakfast)       and importance of weight loss to alleviate those co morbid conditions. I encouraged the patient to continue exercise and keeping healthy eating habits. Also counseled the patient extensively on post surgery care.      Total encounter time:  20 minutes including any number of the following: review of labs, imaging, provider notes, outside hospital records; performing examination/evaluation; counseling patient and family; ordering medications/tests; placing referrals and communication with referring physicians; coordination of care, and documentation in the EHR. RTC in 3 months  Diet and Exercise   Ordered PPI      Patient advised that its their responsibility to follow up for studies and/or labs ordered today.

## 2021-12-22 NOTE — PATIENT INSTRUCTIONS
Patient received dietary handouts and education.     Goals:   · Continue current eating and exercise routine  · Eat protein + plants at same time as tolerated   · Keep water cold and have multiple cups at home to remember to drink frequently

## 2021-12-22 NOTE — TELEPHONE ENCOUNTER
Patients is s/p sleeve and was in office for a visit today and states she needs a return to work letter for today. Email: [email or web address]

## 2021-12-22 NOTE — PROGRESS NOTES
Dietary Assessment Note      Vitals:   Vitals:    12/22/21 0836   Weight: (!) 322 lb 9.6 oz (146.3 kg)   Height: 5' 9.5\" (1.765 m)    Patient lost 16.6 lbs over 2 months. Total Weight Loss: 54.4 lbs    Labs reviewed: no lab studies available for review at time of visit    Protein intake: 60-80 grams/day     Fluid intake: 48-64 oz/day may only drink 2 bottles when taking care of her mom    Multivitamin/mineral intake: yes   4 Fusion    Calcium intake: no    Other: none    Exercise: yes  [facility] 3-4Xweek, , sherrell, water classes    Nutrition Assessment: 14 weeks post-op visit. Still balancing work and taking care of her mom  Breakfast: Oatmeal OR light'n'fit yogurt OR protein shake w/ milk or water  Snack: String cheese OR fruit   Lunch: Oatmeal OR light'n'fit yogurt   Snack: String cheese OR fruit   Dinner: Green beans OR mashed pots  Snack: None    Amount able to eat per sitting: Eating over 30 minutes. Waits 30 minutes between eating and drinking.     Following 30/30/30 rule: 1/2 cup    Food Intolerances/issues: new/ tough textures sit heavy   Generally does not eat seafood or rice    Client Concerns: none    Goals:   · Continue current eating and exercise routine  · Eat protein + plants at same time as tolerated   · Keep water cold and have multiple cups at home to remember to drink frequently     Plan: Follow up at 6 months post op and as needed    Elissa Lazo RD, LD

## 2021-12-23 ENCOUNTER — TELEPHONE (OUTPATIENT)
Dept: GYNECOLOGY | Age: 31
End: 2021-12-23

## 2021-12-23 DIAGNOSIS — R30.0 DYSURIA: Primary | ICD-10-CM

## 2021-12-23 RX ORDER — SULFAMETHOXAZOLE AND TRIMETHOPRIM 800; 160 MG/1; MG/1
1 TABLET ORAL 2 TIMES DAILY
Qty: 14 TABLET | Refills: 0 | Status: SHIPPED | OUTPATIENT
Start: 2021-12-23 | End: 2021-12-30

## 2021-12-23 NOTE — TELEPHONE ENCOUNTER
Patient felt like she has a UTI about a week ago and went to an urgent care. They prescribed her medication. She's used it all. Says it got slightly better but she is still uncomfortable. Wondering if Dr Milagro Koroma could possibly prescribe something that would rid the symptoms completely. Symptoms. Frequent urination, No burning, no cramping, or  No discharge.      Patient can be reached at [phone] [facility], [address] [phone]

## 2021-12-23 NOTE — TELEPHONE ENCOUNTER
Tell patient I called in bactrim to take twice a day for 7 days.  Tell her to get a urine for culture

## 2021-12-28 DIAGNOSIS — R30.0 DYSURIA: ICD-10-CM

## 2021-12-30 LAB — URINE CULTURE, ROUTINE: NORMAL

## 2022-01-11 DIAGNOSIS — N39.0 URINARY TRACT INFECTION WITHOUT HEMATURIA, SITE UNSPECIFIED: Primary | ICD-10-CM

## 2022-01-11 RX ORDER — CIPROFLOXACIN 500 MG/1
500 TABLET, FILM COATED ORAL 2 TIMES DAILY
Qty: 14 TABLET | Refills: 0 | OUTPATIENT
Start: 2022-01-11 | End: 2022-01-18

## 2022-01-11 NOTE — TELEPHONE ENCOUNTER
Called and spoke to patient, asked patient if she had mychart she said yea. I informed her she can do a e- visit with Dr Vickie Alexandre. I explained to patient about E- visits. Patient said she needed a activation code so I gave her phone number to contact my chart help desk [phone]. Told patient I can forward her this message. And mentioned to her net time she can E- visit at any time of the day and it goes straight to Dr Vickie Alexandre.

## 2022-01-11 NOTE — TELEPHONE ENCOUNTER
alled patient [facility] advising she must do a urine culture first, then she can  script that was called into [facility] DONE

## 2022-01-11 NOTE — TELEPHONE ENCOUNTER
Have patient drop off another urine culture. Call in Ciprofloxacin 500 mg po bid for 7 days. Dispense 14. No refills.

## 2022-01-12 DIAGNOSIS — N39.0 URINARY TRACT INFECTION WITHOUT HEMATURIA, SITE UNSPECIFIED: ICD-10-CM

## 2022-01-13 LAB — URINE CULTURE, ROUTINE: NORMAL

## 2022-01-13 RX ORDER — CEFDINIR 300 MG/1
300 CAPSULE ORAL 2 TIMES DAILY
Qty: 14 CAPSULE | Refills: 0 | Status: SHIPPED | OUTPATIENT
Start: 2022-01-13 | End: 2022-01-20

## 2022-01-21 ENCOUNTER — TELEPHONE (OUTPATIENT)
Dept: GYNECOLOGY | Age: 32
End: 2022-01-21

## 2022-01-27 ENCOUNTER — TELEPHONE (OUTPATIENT)
Dept: GYNECOLOGY | Age: 32
End: 2022-01-27

## 2022-01-27 NOTE — TELEPHONE ENCOUNTER
Called and spoke to patient, needed to know the name of the physician the results are to be sent to. Patient wasn't sure of physicians name. Informed patient that we needed that. Confirmed number she gave our office. Patient then stated she wasn't sure of fax number. Informed her that we needed the correct fax number and physician name.  So it would not be any issues

## 2022-01-27 NOTE — TELEPHONE ENCOUNTER
Patient as appointment with Urologist tomorrow morning. They are requesting a fax of the urine culture.      Fax [phone]

## 2022-02-01 ENCOUNTER — OFFICE VISIT (OUTPATIENT)
Dept: GYNECOLOGY | Age: 32
End: 2022-02-01
Payer: COMMERCIAL

## 2022-02-01 VITALS
DIASTOLIC BLOOD PRESSURE: 90 MMHG | OXYGEN SATURATION: 99 % | HEIGHT: 70 IN | HEART RATE: 101 BPM | BODY MASS INDEX: 41.95 KG/M2 | WEIGHT: 293 LBS | RESPIRATION RATE: 17 BRPM | SYSTOLIC BLOOD PRESSURE: 138 MMHG

## 2022-02-01 DIAGNOSIS — R10.2 PELVIC PAIN IN FEMALE: ICD-10-CM

## 2022-02-01 DIAGNOSIS — Z30.49 ENCOUNTER FOR SURVEILLANCE OF OTHER CONTRACEPTIVE: ICD-10-CM

## 2022-02-01 DIAGNOSIS — Z01.419 WELL WOMAN EXAM WITH ROUTINE GYNECOLOGICAL EXAM: Primary | ICD-10-CM

## 2022-02-01 PROCEDURE — 99395 PREV VISIT EST AGE 18-39: CPT | Performed by: OBSTETRICS & GYNECOLOGY

## 2022-02-01 RX ORDER — ETONOGESTREL AND ETHINYL ESTRADIOL 11.7; 2.7 MG/1; MG/1
1 INSERT, EXTENDED RELEASE VAGINAL SEE ADMIN INSTRUCTIONS
Qty: 3 EACH | Refills: 3 | Status: SHIPPED | OUTPATIENT
Start: 2022-02-01

## 2022-02-03 LAB
C TRACH DNA GENITAL QL NAA+PROBE: NEGATIVE
N. GONORRHOEAE DNA: NEGATIVE

## 2022-02-05 ASSESSMENT — ENCOUNTER SYMPTOMS
GASTROINTESTINAL NEGATIVE: 1
EYES NEGATIVE: 1
RESPIRATORY NEGATIVE: 1

## 2022-02-05 NOTE — PROGRESS NOTES
Subjective:      Patient ID: Claudia Brady is a 33 y.o. female. Patient is here for annual. Patient with urinary frequency. Gynecologic Exam        Review of Systems   Constitutional: Negative. HENT: Negative. Eyes: Negative. Respiratory: Negative. Cardiovascular: Negative. Gastrointestinal: Negative. Genitourinary: Positive for difficulty urinating and frequency. Musculoskeletal: Negative. Skin: Negative. Neurological: Negative. Psychiatric/Behavioral: Negative. Date of Birth 1990  Past Medical History:   Diagnosis Date    Asthma     controlled    Generalized anxiety disorder     Moderate episode of recurrent major depressive disorder ([facility] [address] 2018    Morbid obesity with BMI of 50.0-59.9, adult ([facility] [address]     Unspecified vitamin D deficiency 2011     Past Surgical History:   Procedure Laterality Date    DILATION AND CURETTAGE OF UTERUS  2013    ENDOSCOPY, COLON, DIAGNOSTIC      Pt denies colonoscopy (21)    SLEEVE GASTRECTOMY N/A 2021    ROBOTIC ASSISTED LAPAROSCOPIC SLEEVE GASTRECTOMY performed by Nestor Herrera DO at [address] [place] [address] N/A 05/10/2021    EGD BIOPSY performed by Nestor Herrera DO at [facility] ENDOSCOPY     OB History    Para Term  AB Living   4 1 1 0 2 1   SAB IAB Ectopic Molar Multiple Live Births   0 2 0   0 1      # Outcome Date GA Lbr Be/2nd Weight Sex Delivery Anes PTL Lv   4          FD   3 Term  37w0d   F Vag-Spont   RANCHO   2 IAB         FD   1 IAB         FD     Social History     Socioeconomic History    Marital status: Single     Spouse name: Not on file    Number of children: Not on file    Years of education: Not on file    Highest education level: Not on file   Occupational History    Not on file   Tobacco Use    Smoking status: Never Smoker    Smokeless tobacco: Never Used   Vaping Use    Vaping Use: Never used   Substance and Sexual Activity    Alcohol use:  Yes Comment: rare    Drug use: No    Sexual activity: Yes     Partners: Male   Other Topics Concern    Not on file   Social History Narrative    Not on file     Social Determinants of Health     Financial Resource Strain: Low Risk     Difficulty of Paying Living Expenses: Not hard at all   Food Insecurity: No Food Insecurity    Worried About Running Out of Food in the Last Year: Never true    [address] in the Last Year: Never true   Transportation Needs:     Lack of Transportation (Medical): Not on file    Lack of Transportation (Non-Medical):  Not on file   Physical Activity:     Days of Exercise per Week: Not on file    Minutes of Exercise per Session: Not on file   Stress:     Feeling of Stress : Not on file   Social Connections:     Frequency of Communication with Friends and Family: Not on file    Frequency of Social Gatherings with Friends and Family: Not on file    Attends Caoda[employer] Services: Not on file    Active Member of [address] [place] [address] or Organizations: Not on file    Attends Club or Organization Meetings: Not on file    Marital Status: Not on file   Intimate Partner Violence:     Fear of Current or Ex-Partner: Not on file    Emotionally Abused: Not on file    Physically Abused: Not on file    Sexually Abused: Not on file   Housing Stability:     Unable to Pay for Housing in the Last Year: Not on file    Number of [place] in the Last Year: Not on file    Unstable Housing in the Last Year: Not on file     No Known Allergies  Outpatient Medications Marked as Taking for the 2/1/22 encounter (Office Visit) with Jan Figueroa MD   Medication Sig Dispense Refill    etonogestrel-ethinyl estradiol Shayne Byrnes) 0.12-0.015 MG/24HR vaginal ring Place 1 each vaginally See Admin Instructions 3 each 3    omeprazole (PRILOSEC) 20 MG delayed release capsule Take 1 capsule by mouth every morning (before breakfast) 90 capsule 1    Hyoscyamine Sulfate SL (LEVSIN/SL) 0.125 MG SUBL Place 1 tablet under the tongue every 6 hours as needed (spasm) 30 each 0    omeprazole (PRILOSEC) 20 MG delayed release capsule Take 1 capsule by mouth Daily 30 capsule 3    ondansetron (ZOFRAN) 4 MG tablet Take 1 tablet by mouth every 6 hours as needed for Nausea or Vomiting 30 tablet 0     Family History   Problem Relation Age of Onset    Hypertension Mother    Natalie Balderrama Migraines Mother     Cancer Father     Hypertension Father     Elevated Lipids Father     Diabetes Father     Heart Attack Paternal Grandmother     Hypertension Paternal Grandmother     Rheum Arthritis Neg Hx     Osteoarthritis Neg Hx     Asthma Neg Hx     Breast Cancer Neg Hx     Heart Failure Neg Hx     High Cholesterol Neg Hx     Ovarian Cancer Neg Hx     Rashes/Skin Problems Neg Hx     Seizures Neg Hx     Stroke Neg Hx     Thyroid Disease Neg Hx      BP (!) 138/90 (Site: Right Lower Arm, Position: Sitting, Cuff Size: Large Adult)   Pulse 101   Resp 17   Ht 5' 10\" (1.778 m)   Wt (!) 311 lb 12.8 oz (141.4 kg)   LMP 01/03/2022   SpO2 99%   BMI 44.74 kg/m²       Objective:   Physical Exam  Constitutional:       Appearance: Normal appearance. She is well-developed and normal weight. HENT:      Head: Normocephalic. Nose: Nose normal.      Mouth/Throat:      Mouth: Mucous membranes are moist.      Pharynx: Oropharynx is clear. Eyes:      Pupils: Pupils are equal, round, and reactive to light. Neck:      Thyroid: No thyromegaly. Cardiovascular:      Rate and Rhythm: Normal rate and regular rhythm. Pulses: Normal pulses. Heart sounds: Normal heart sounds. No murmur heard. No friction rub. No gallop. Pulmonary:      Effort: Pulmonary effort is normal. No respiratory distress. Breath sounds: Normal breath sounds. No stridor. No wheezing, rhonchi or rales. Chest:      Chest wall: No tenderness. Breasts:      Right: Normal. No swelling, bleeding, inverted nipple, mass, nipple discharge, skin change or tenderness. Left: Normal. No swelling, bleeding, inverted nipple, mass, nipple discharge, skin change or tenderness. Abdominal:      General: Bowel sounds are normal. There is no distension. Palpations: Abdomen is soft. There is no mass. Tenderness: There is no abdominal tenderness. There is no guarding or rebound. Hernia: No hernia is present. There is no hernia in the left inguinal area. Genitourinary:     General: Normal vulva. Exam position: Lithotomy position. Pubic Area: No rash. Labia:         Right: No rash, tenderness, lesion or injury. Left: No rash, tenderness, lesion or injury. Urethra: No prolapse, urethral pain, urethral swelling or urethral lesion. Vagina: No signs of injury and foreign body. No vaginal discharge, erythema, tenderness, bleeding, lesions or prolapsed vaginal walls. Cervix: No cervical motion tenderness, discharge, friability, lesion, erythema, cervical bleeding or eversion. Uterus: Not deviated, not enlarged, not fixed and not tender. Adnexa:         Right: No mass, tenderness or fullness. Left: No mass, tenderness or fullness. Rectum: No anal fissure or external hemorrhoid. Comments: Normal urethral meatus, normal urethra, nl bladder  Musculoskeletal:         General: No tenderness. Normal range of motion. Cervical back: Normal range of motion and neck supple. No rigidity. Lymphadenopathy:      Cervical: No cervical adenopathy. Lower Body: No right inguinal adenopathy. No left inguinal adenopathy. Skin:     General: Skin is warm and dry. Coloration: Skin is not pale. Findings: No erythema or rash. Neurological:      General: No focal deficit present. Mental Status: She is alert and oriented to person, place, and time. Mental status is at baseline. Deep Tendon Reflexes: Reflexes are normal and symmetric.    Psychiatric:         Mood and Affect: Mood normal. Behavior: Behavior normal.         Thought Content: Thought content normal.         Judgment: Judgment normal.         Assessment:      1. Annual  2. Urinary frequency  3. Birth control      Plan:      1. Pap, calcium, exercise,want dna probe   2. Had negative UC-needs to see urology. Mother just  of bladder cancer.   3. Amado Ko MD

## 2022-02-09 RX ORDER — CLINDAMYCIN PHOSPHATE 20 MG/G
1 CREAM VAGINAL NIGHTLY
Qty: 1 EACH | Refills: 0 | OUTPATIENT
Start: 2022-02-09 | End: 2022-02-16

## 2022-03-02 ENCOUNTER — OFFICE VISIT (OUTPATIENT)
Dept: BARIATRICS/WEIGHT MGMT | Age: 32
End: 2022-03-02
Payer: COMMERCIAL

## 2022-03-02 VITALS — WEIGHT: 293 LBS | BODY MASS INDEX: 41.95 KG/M2 | HEIGHT: 70 IN

## 2022-03-02 DIAGNOSIS — Z98.84 STATUS POST LAPAROSCOPIC SLEEVE GASTRECTOMY: ICD-10-CM

## 2022-03-02 DIAGNOSIS — E66.01 MORBID OBESITY WITH BMI OF 45.0-49.9, ADULT (HCC): Primary | ICD-10-CM

## 2022-03-02 PROCEDURE — 99213 OFFICE O/P EST LOW 20 MIN: CPT | Performed by: SURGERY

## 2022-03-02 NOTE — PROGRESS NOTES
Dietary Assessment Note      Vitals:   Vitals:    22 0833   Weight: (!) 314 lb (142.4 kg)   Height: 5' 9.5\" (1.765 m)    Patient lost 8.6 lbs over 3 months. Total Weight Loss: 63 lbs    Labs reviewed: no lab studies available for review at time of visit    Protein intake: < 60g    Fluid intake: 48-64 oz/day    Multivitamin/mineral intake: Yes - 4 Fusion    Calcium intake: none    Other: none    Exercise: none structured, stopped going to  when Mom ill, passed away this month    Nutrition Assessment: 6mo post-op visit. Pt reports being somewhat off track d/t death of mom. Eating 3x day. Starts back to work today, states schedule should help.   B - Lit n Fit yogurt + fruit / bowl cheerios  S - fruit / leftovers salad couple times  D - salad + sometimes grilled chix / pasta + chix    Amount able to eat per sittin-3oz protein + 1/4-1/2? sides    Following  rule: Yes    Food Intolerances/issues: none    Client Concerns: wt plateau    Goals:   - Focus on eating 4-5x day consistently  - Track protein to 60-80g daily - protein shake PRN to meet goal  - Get back to exercise regimen    Handouts: 1200kcal post-op MP, protein bar    Plan: F/U per MD Timo Canela, RD, LD

## 2022-03-18 NOTE — PROGRESS NOTES
[facility]) Physicians   General & Laparoscopic Surgery  [employer]       HPI:     John Noel is a very pleasant 33 y.o. obese female , Body mass index is 45.7 kg/m². Maria Elena Kid And multiple medical problems who is presenting for bariatric follow up care. John Noel is s/p laparoscopic sleeve gastrectomy by me   Comes today to the clinic without any complaints. Patient denies any nausea, vomiting, fevers, chills, shortness of breath, chest pain, constipation or urinary symptoms. Denies any heartburn nor dysphagia. Patient is feeling very well, and is very active. Patient is very pleased with the weight loss and resolution of co-morbid conditions.         Past Medical History:   Diagnosis Date    Asthma     controlled    Generalized anxiety disorder     Moderate episode of recurrent major depressive disorder ([email or web address] 07/19/2018    Morbid obesity with BMI of 50.0-59.9, adult ([email or web address]     Unspecified vitamin D deficiency 04/13/2011     Past Surgical History:   Procedure Laterality Date    DILATION AND CURETTAGE OF UTERUS  04/19/2013    ENDOSCOPY, COLON, DIAGNOSTIC      Pt denies colonoscopy (9/14/21)    SLEEVE GASTRECTOMY N/A 9/14/2021    ROBOTIC ASSISTED LAPAROSCOPIC SLEEVE GASTRECTOMY performed by Natali Coats DO at [facility]. N/A 05/10/2021    EGD BIOPSY performed by Natali Coats DO at [facility] History   Problem Relation Age of Onset    Hypertension Mother    Linsey Lobarchie Migraines Mother     Cancer Father     Hypertension Father     Elevated Lipids Father     Diabetes Father     Heart Attack Paternal Grandmother     Hypertension Paternal Grandmother     Rheum Arthritis Neg Hx     Osteoarthritis Neg Hx     Asthma Neg Hx     Breast Cancer Neg Hx     Heart Failure Neg Hx     High Cholesterol Neg Hx     Ovarian Cancer Neg Hx     Rashes/Skin Problems Neg Hx     Seizures Neg Hx     Stroke Neg Hx     Thyroid Disease Neg Hx      Social History Tobacco Use    Smoking status: Never Smoker    Smokeless tobacco: Never Used   Substance Use Topics    Alcohol use: Yes     Comment: rare     I counseled the patient on the importance of not smoking and risks of ETOH. No Known Allergies  Vitals:    03/02/22 0833   Weight: (!) 314 lb (142.4 kg)   Height: 5' 9.5\" (1.765 m)       Body mass index is 45.7 kg/m². Current Outpatient Medications:     Multiple Vitamin (MULTIVITAMIN, BARIATRIC FUSION COMPLETE, CHEW TAB), Take 4 tablets by mouth daily, Disp: , Rfl:     etonogestrel-ethinyl estradiol (ELURYNG) 0.12-0.015 MG/24HR vaginal ring, Place 1 each vaginally See Admin Instructions, Disp: 3 each, Rfl: 3    omeprazole (PRILOSEC) 20 MG delayed release capsule, Take 1 capsule by mouth every morning (before breakfast), Disp: 90 capsule, Rfl: 1    omeprazole (PRILOSEC) 20 MG delayed release capsule, Take 1 capsule by mouth Daily, Disp: 30 capsule, Rfl: 3    ondansetron (ZOFRAN) 4 MG tablet, Take 1 tablet by mouth every 6 hours as needed for Nausea or Vomiting, Disp: 30 tablet, Rfl: 0      Review of Systems - History obtained from the patient  General ROS: negative  Psychological ROS: negative  Hematological and Lymphatic ROS: negative  Endocrine ROS: negative  Respiratory ROS: negative  Cardiovascular ROS: negative  Gastrointestinal ROS:negative  Genito-Urinary ROS: negative  Musculoskeletal ROS: negative   Skin ROS: negative    Physical Exam   Vitals Reviewed   Constitutional: Patient is oriented to person, place, and time. Patient appears well-developed and well-nourished. Patient is active and cooperative. Non-toxic appearance. No distress. Neck: Trachea normal and normal range of motion. No JVD present. Pulmonary/Chest: Effort normal. No accessory muscle usage or stridor. No apnea. No respiratory distress. Cardiovascular: Normal rate and no JVD. Abdominal: Normal appearance. Patient exhibits no distension. Abdomen is soft, obese, non tender. Musculoskeletal: Normal range of motion. Patient exhibits no edema. Neurological: Patient is alert and oriented to person, place, and time. Patient has normal strength. GCS eye subscore is 4. GCS verbal subscore is 5. GCS motor subscore is 6. Skin: Skin is warm and dry. No abrasion and no rash noted. Patient is not diaphoretic. No cyanosis or erythema. Psychiatric: Patient has a normal mood and affect. Speech is normal and behavior is normal. Cognition and memory are normal.         A/P     Camilo Guallpa is 33 y.o. female , now with Body mass index is 45.7 kg/m². s/p Sleeve gastrectomy, has lost 9 lbs since last visit, total of 63 lbs weight loss. The patient underwent dietary counseling with registered dietician. I have reviewed, discussed and agree with the dietary plan. Patient is trying hard to keep good dietary and behavior modifications. Patient is monitoring portion sizes, food choices and liquid calories. Patient is trying to exercise regularly. Patient pleased with the surgery outcomes. We discussed how her weight affects her overall health including:  Sheffield Sic was seen today for bariatrics post op follow up. Diagnoses and all orders for this visit:    Morbid obesity with BMI of 45.0-49.9, adult ([facility] [address]    Status post laparoscopic sleeve gastrectomy       and importance of weight loss to alleviate those co morbid conditions. I encouraged the patient to continue exercise and keeping healthy eating habits. Also counseled the patient extensively on post surgery care. Total encounter time:  20 minutes including any number of the following: review of labs, imaging, provider notes, outside hospital records; performing examination/evaluation; counseling patient and family; ordering medications/tests; placing referrals and communication with referring physicians; coordination of care, and documentation in the EHR.      RTC in 3 months  Diet and Exercise      Patient advised that its their responsibility to follow up for studies and/or labs ordered today.

## 2022-06-06 ENCOUNTER — OFFICE VISIT (OUTPATIENT)
Dept: BARIATRICS/WEIGHT MGMT | Age: 32
End: 2022-06-06
Payer: COMMERCIAL

## 2022-06-06 VITALS — BODY MASS INDEX: 41.95 KG/M2 | WEIGHT: 293 LBS | HEIGHT: 70 IN

## 2022-06-06 DIAGNOSIS — E66.01 MORBID OBESITY WITH BMI OF 40.0-44.9, ADULT (HCC): Primary | ICD-10-CM

## 2022-06-06 DIAGNOSIS — Z98.84 STATUS POST LAPAROSCOPIC SLEEVE GASTRECTOMY: ICD-10-CM

## 2022-06-06 PROCEDURE — 99213 OFFICE O/P EST LOW 20 MIN: CPT | Performed by: SURGERY

## 2022-06-06 NOTE — PROGRESS NOTES
[facility]) Physicians   General & Laparoscopic Surgery  [employer]       HPI:     Vira Farley is a very pleasant 27 y.o. obese female , Body mass index is 44.83 kg/m². Stephanie Graven And multiple medical problems who is presenting for bariatric follow up care. Vira Farley is s/p laparoscopic sleeve gastrectomy by me   Comes today to the clinic without any complaints. Patient denies any nausea, vomiting, fevers, chills, shortness of breath, chest pain, constipation or urinary symptoms. Denies any heartburn nor dysphagia. Patient is feeling very well, and is very active. Patient is very pleased with the weight loss and resolution of co-morbid conditions.         Past Medical History:   Diagnosis Date    Asthma     controlled    Generalized anxiety disorder     Moderate episode of recurrent major depressive disorder ([facility] [address] 07/19/2018    Morbid obesity with BMI of 50.0-59.9, adult ([facility]     Unspecified vitamin D deficiency 04/13/2011     Past Surgical History:   Procedure Laterality Date    DILATION AND CURETTAGE OF UTERUS  04/19/2013    ENDOSCOPY, COLON, DIAGNOSTIC      Pt denies colonoscopy (9/14/21)    SLEEVE GASTRECTOMY N/A 9/14/2021    ROBOTIC ASSISTED LAPAROSCOPIC SLEEVE GASTRECTOMY performed by Clarice Haji DO at [facility] N/A 05/10/2021    EGD BIOPSY performed by Clarice Haji DO at [address] [facility] History   Problem Relation Age of Onset    Hypertension Mother    Ramirez Migraines Mother     Cancer Father     Hypertension Father     Elevated Lipids Father     Diabetes Father     Heart Attack Paternal Grandmother     Hypertension Paternal Grandmother     Rheum Arthritis Neg Hx     Osteoarthritis Neg Hx     Asthma Neg Hx     Breast Cancer Neg Hx     Heart Failure Neg Hx     High Cholesterol Neg Hx     Ovarian Cancer Neg Hx     Rashes/Skin Problems Neg Hx     Seizures Neg Hx     Stroke Neg Hx     Thyroid Disease Neg Hx      Social History Tobacco Use    Smoking status: Never Smoker    Smokeless tobacco: Never Used   Substance Use Topics    Alcohol use: Yes     Comment: rare     I counseled the patient on the importance of not smoking and risks of ETOH. No Known Allergies  Vitals:    06/06/22 0832   Weight: (!) 308 lb (139.7 kg)   Height: 5' 9.5\" (1.765 m)       Body mass index is 44.83 kg/m². Current Outpatient Medications:     Multiple Vitamin (MULTIVITAMIN, BARIATRIC FUSION COMPLETE, CHEW TAB), Take 4 tablets by mouth daily, Disp: , Rfl:     etonogestrel-ethinyl estradiol (ELURYNG) 0.12-0.015 MG/24HR vaginal ring, Place 1 each vaginally See Admin Instructions, Disp: 3 each, Rfl: 3    omeprazole (PRILOSEC) 20 MG delayed release capsule, Take 1 capsule by mouth every morning (before breakfast), Disp: 90 capsule, Rfl: 1    omeprazole (PRILOSEC) 20 MG delayed release capsule, Take 1 capsule by mouth Daily, Disp: 30 capsule, Rfl: 3    ondansetron (ZOFRAN) 4 MG tablet, Take 1 tablet by mouth every 6 hours as needed for Nausea or Vomiting, Disp: 30 tablet, Rfl: 0      Review of Systems - History obtained from the patient  General ROS: negative  Psychological ROS: negative  Hematological and Lymphatic ROS: negative  Endocrine ROS: negative  Respiratory ROS: negative  Cardiovascular ROS: negative  Gastrointestinal ROS:negative  Genito-Urinary ROS: negative  Musculoskeletal ROS: negative   Skin ROS: negative    Physical Exam   Vitals Reviewed   Constitutional: Patient is oriented to person, place, and time. Patient appears well-developed and well-nourished. Patient is active and cooperative. Non-toxic appearance. No distress. Neck: Trachea normal and normal range of motion. No JVD present. Pulmonary/Chest: Effort normal. No accessory muscle usage or stridor. No apnea. No respiratory distress. Cardiovascular: Normal rate and no JVD. Abdominal: Normal appearance. Patient exhibits no distension. Abdomen is soft, obese, non tender. Musculoskeletal: Normal range of motion. Patient exhibits no edema. Neurological: Patient is alert and oriented to person, place, and time. Patient has normal strength. GCS eye subscore is 4. GCS verbal subscore is 5. GCS motor subscore is 6. Skin: Skin is warm and dry. No abrasion and no rash noted. Patient is not diaphoretic. No cyanosis or erythema. Psychiatric: Patient has a normal mood and affect. Speech is normal and behavior is normal. Cognition and memory are normal.         A/P     Karli Perez is 27 y.o. female , now with Body mass index is 44.83 kg/m². s/p Sleeve gastrectomy, has lost lost 6 lbs since last visit, total of 69 lbs weight loss. The patient underwent dietary counseling with registered dietician. I have reviewed, discussed and agree with the dietary plan. Patient is trying hard to keep good dietary and behavior modifications. Patient is monitoring portion sizes, food choices and liquid calories. Patient is trying to exercise regularly. Patient pleased with the surgery outcomes. We discussed how her weight affects her overall health including:  Marcus Lowry was seen today for bariatrics post op follow up. Diagnoses and all orders for this visit:    Morbid obesity with BMI of 40.0-44.9, adult ([facility] [address]    Status post laparoscopic sleeve gastrectomy       and importance of weight loss to alleviate those co morbid conditions. I encouraged the patient to continue exercise and keeping healthy eating habits. Also counseled the patient extensively on post surgery care. Total encounter time:  20 minutes including any number of the following: review of labs, imaging, provider notes, outside hospital records; performing examination/evaluation; counseling patient and family; ordering medications/tests; placing referrals and communication with referring physicians; coordination of care, and documentation in the EHR.      RTC in 3 months  Diet and Exercise      Patient advised that its their responsibility to follow up for studies and/or labs ordered today.

## 2022-06-06 NOTE — PROGRESS NOTES
Dietary Assessment Note    Vitals:   Vitals:    06/06/22 0832   Weight: (!) 308 lb (139.7 kg)   Height: 5' 9.5\" (1.765 m)    Patient lost 6 lbs over 3 months. Total Weight Loss: 69#    Labs reviewed: no lab studies available for review at time of visit    Protein intake: Patient not tracking     Fluid intake: 48-64 oz/day - feels she is doing well with this. Multivitamin/mineral intake: Fusion - how many/day: 4    Calcium intake: None    Other: None    Exercise: Yes. How much: Walking 3x/week. Feels she should be doing more. Nutrition Assessment: 9 month post-op visit. Pt not eating as frequently as she feels she should. Breakfast: protein shake - nectar mixed with unsweet almond milk OR 1 cup fruit OR none OR eggs w/ turkey kaminski and 1/2 piece toast    Snack: fruit OR PB crackers    Lunch: chicken salad w/ FF ranch (1/2 salad)    Snack: None    Dinner: pasta (reduced portion) w/ chicken sometimes w/ green beans    Snack: None    30-30-30: Following  Amount at a time: 1 cup total volume    Food Intolerances/issues: none    Client Concerns: Slower loss w/ grieving her mother and not being able to focus on the post-surgical process as much as she would have liked.      Goals:   Protein with meals/snacks  Work on consistency of intakes - 4x/day goal  Increase exercise/intensity    Plan: Follow up at 1 year post op      Oliverio Jarvis RD, CEE

## 2022-09-06 NOTE — PROGRESS NOTES
Dietary Assessment Note    This eval was conducted via phone on 9/6/22 in preparation for their visit on 9/7/22 with Dr. Escobar Hernandez. CW: 306 - 308 lb, per pt report    Patient wt is fairly stable / unchanged. Total Weight Loss: 69-71 lbs    Labs reviewed: no new labs    Protein intake: believes 60-80 grams/day     Fluid intake: ~64oz - water     Multivitamin/mineral intake: yes - 4 fusion     Calcium intake: no    Other: none    Exercise: yes - walking 3x/wk for ~45 min to 90 min    Nutrition Assessment: 1 year post-op visit. Pt intake down over the past week d/t dental surgery. Pt states it has been difficult over the past few months. Life is hectic.     B- fruit  S- protein shake   L- chili OR mashed potatoes OR pasta  S- yogurt OR applesauce  D- \"varies\" protein shake OR smoothie: kale/fruit ~1 cup OR skips   S- none    Amount able to eat per sitting: ~1/2 cup volume    Following 30/30/30 rule: yes    Food Intolerances/issues:  spicy    Client Concerns: decreased appetite / not going as suspected / feels nauseated in AM when thinking about food / pt feels like she is \"playing catch up\" lost her mom at the beginning of the year, does see a therapist     Goals:   - Consistently have 2 protein shake daily  - Track intake: aim for [id number] viviana / 60-80 gm    *Handouts: frozen meals / 1200 viviana post-op meal plan    Plan: f/u as directed     Lee FRANCISCA Man, CEE

## 2022-09-07 ENCOUNTER — OFFICE VISIT (OUTPATIENT)
Dept: BARIATRICS/WEIGHT MGMT | Age: 32
End: 2022-09-07
Payer: COMMERCIAL

## 2022-09-07 ENCOUNTER — TELEPHONE (OUTPATIENT)
Dept: BARIATRICS/WEIGHT MGMT | Age: 32
End: 2022-09-07

## 2022-09-07 VITALS
DIASTOLIC BLOOD PRESSURE: 88 MMHG | WEIGHT: 293 LBS | SYSTOLIC BLOOD PRESSURE: 128 MMHG | BODY MASS INDEX: 41.95 KG/M2 | HEART RATE: 89 BPM | HEIGHT: 70 IN

## 2022-09-07 DIAGNOSIS — Z98.84 STATUS POST LAPAROSCOPIC SLEEVE GASTRECTOMY: ICD-10-CM

## 2022-09-07 DIAGNOSIS — E66.01 MORBID OBESITY WITH BMI OF 40.0-44.9, ADULT (HCC): Primary | ICD-10-CM

## 2022-09-07 PROCEDURE — 99213 OFFICE O/P EST LOW 20 MIN: CPT | Performed by: SURGERY

## 2022-09-07 NOTE — TELEPHONE ENCOUNTER
Patient seen for follow-up appointment with Dr. Bela Schuler today. She is requesting a signed work excuse e-mailed to her. E-mail correct in [place].

## 2022-09-07 NOTE — PROGRESS NOTES
[facility]   General & Laparoscopic Surgery  [employer]       HPI:     Raji Yung is a very pleasant 27 y.o. obese female , Body mass index is 45.7 kg/m². Sherryle Moder And multiple medical problems who is presenting for bariatric follow up care. Raji Yung is s/p laparoscopic sleeve gastrectomy by me   Comes today to the clinic without any complaints. Patient denies any nausea, vomiting, fevers, chills, shortness of breath, chest pain, constipation or urinary symptoms. Denies any heartburn nor dysphagia. Patient is feeling very well, and is very active.      Has struggled some since her mother passed in January      Past Medical History:   Diagnosis Date    Asthma     controlled    Generalized anxiety disorder     Moderate episode of recurrent major depressive disorder ([facility] [address] 07/19/2018    Morbid obesity with BMI of 50.0-59.9, adult ([facility] [address]     Unspecified vitamin D deficiency 04/13/2011     Past Surgical History:   Procedure Laterality Date    DILATION AND CURETTAGE OF UTERUS  04/19/2013    ENDOSCOPY, COLON, DIAGNOSTIC      Pt denies colonoscopy (9/14/21)    SLEEVE GASTRECTOMY N/A 9/14/2021    ROBOTIC ASSISTED LAPAROSCOPIC SLEEVE GASTRECTOMY performed by Richard De La Cruz DO at [address] N/A 05/10/2021    EGD BIOPSY performed by Richard De La Cruz DO at [address] History   Problem Relation Age of Onset    Hypertension Mother     Migraines Mother     Cancer Father     Hypertension Father     Elevated Lipids Father     Diabetes Father     Heart Attack Paternal Grandmother     Hypertension Paternal Grandmother     Rheum Arthritis Neg Hx     Osteoarthritis Neg Hx     Asthma Neg Hx     Breast Cancer Neg Hx     Heart Failure Neg Hx     High Cholesterol Neg Hx     Ovarian Cancer Neg Hx     Rashes/Skin Problems Neg Hx     Seizures Neg Hx     Stroke Neg Hx     Thyroid Disease Neg Hx      Social History     Tobacco Use    Smoking status: Never    Smokeless tobacco: Never   Substance Use Topics    Alcohol use: Yes     Comment: rare     I counseled the patient on the importance of not smoking and risks of ETOH. No Known Allergies  Vitals:    09/07/22 0821   BP: 128/88   Pulse: 89   Weight: (!) 314 lb (142.4 kg)   Height: 5' 9.5\" (1.765 m)       Body mass index is 45.7 kg/m². Current Outpatient Medications:     Multiple Vitamin (MULTIVITAMIN, BARIATRIC FUSION COMPLETE, CHEW TAB), Take 4 tablets by mouth daily, Disp: , Rfl:     etonogestrel-ethinyl estradiol (ELURYNG) 0.12-0.015 MG/24HR vaginal ring, Place 1 each vaginally See Admin Instructions, Disp: 3 each, Rfl: 3    omeprazole (PRILOSEC) 20 MG delayed release capsule, Take 1 capsule by mouth every morning (before breakfast), Disp: 90 capsule, Rfl: 1    omeprazole (PRILOSEC) 20 MG delayed release capsule, Take 1 capsule by mouth Daily, Disp: 30 capsule, Rfl: 3    ondansetron (ZOFRAN) 4 MG tablet, Take 1 tablet by mouth every 6 hours as needed for Nausea or Vomiting, Disp: 30 tablet, Rfl: 0      Review of Systems - History obtained from the patient  General ROS: negative  Psychological ROS: negative  Hematological and Lymphatic ROS: negative  Endocrine ROS: negative  Respiratory ROS: negative  Cardiovascular ROS: negative  Gastrointestinal ROS:negative  Genito-Urinary ROS: negative  Musculoskeletal ROS: negative   Skin ROS: negative    Physical Exam   Vitals Reviewed   Constitutional: Patient is oriented to person, place, and time. Patient appears well-developed and well-nourished. Patient is active and cooperative. Non-toxic appearance. No distress. Neck: Trachea normal and normal range of motion. No JVD present. Pulmonary/Chest: Effort normal. No accessory muscle usage or stridor. No apnea. No respiratory distress. Cardiovascular: Normal rate and no JVD. Abdominal: Normal appearance. Patient exhibits no distension. Abdomen is soft, obese, non tender. Musculoskeletal: Normal range of motion.  Patient exhibits no edema. Neurological: Patient is alert and oriented to person, place, and time. Patient has normal strength. GCS eye subscore is 4. GCS verbal subscore is 5. GCS motor subscore is 6. Skin: Skin is warm and dry. No abrasion and no rash noted. Patient is not diaphoretic. No cyanosis or erythema. Psychiatric: Patient has a normal mood and affect. Speech is normal and behavior is normal. Cognition and memory are normal.         A/P     Raji Yung is 27 y.o. female , now with Body mass index is 45.7 kg/m². s/p Sleeve gastrectomy, has gained 4 lbs since last visit, total of 65 lbs weight loss. The patient underwent dietary counseling with registered dietician. I have reviewed, discussed and agree with the dietary plan. Patient is trying hard to keep good dietary and behavior modifications. Patient is monitoring portion sizes, food choices and liquid calories. Patient is trying to exercise regularly. Patient pleased with the surgery outcomes. We discussed how her weight affects her overall health including:  Ramírez Carpio was seen today for bariatrics post op follow up. Diagnoses and all orders for this visit:    Morbid obesity with BMI of 40.0-44.9, adult ([facility] [address]    Status post laparoscopic sleeve gastrectomy       and importance of weight loss to alleviate those co morbid conditions. I encouraged the patient to continue exercise and keeping healthy eating habits. Also counseled the patient extensively on post surgery care. Total encounter time:  20 minutes including any number of the following: review of labs, imaging, provider notes, outside hospital records; performing examination/evaluation; counseling patient and family; ordering medications/tests; placing referrals and communication with referring physicians; coordination of care, and documentation in the EHR.      RTC in 12 months  Diet and Exercise   Referred to [facility]     Patient advised that its their responsibility to follow up for studies

## 2022-11-18 ENCOUNTER — TELEPHONE (OUTPATIENT)
Dept: BARIATRICS/WEIGHT MGMT | Age: 32
End: 2022-11-18

## 2022-11-18 ENCOUNTER — TELEMEDICINE (OUTPATIENT)
Dept: BARIATRICS/WEIGHT MGMT | Age: 32
End: 2022-11-18
Payer: COMMERCIAL

## 2022-11-18 DIAGNOSIS — Z71.3 DIETARY COUNSELING AND SURVEILLANCE: ICD-10-CM

## 2022-11-18 DIAGNOSIS — Z98.84 STATUS POST LAPAROSCOPIC SLEEVE GASTRECTOMY: ICD-10-CM

## 2022-11-18 DIAGNOSIS — E66.01 MORBID OBESITY WITH BMI OF 40.0-44.9, ADULT (HCC): Primary | ICD-10-CM

## 2022-11-18 PROCEDURE — 99214 OFFICE O/P EST MOD 30 MIN: CPT | Performed by: FAMILY MEDICINE

## 2022-11-18 ASSESSMENT — ENCOUNTER SYMPTOMS
VOMITING: 0
CHEST TIGHTNESS: 0
DIARRHEA: 0
NAUSEA: 0
ABDOMINAL PAIN: 0
WHEEZING: 0
EYE PAIN: 0
CHOKING: 0
ABDOMINAL DISTENTION: 0
COUGH: 0
CONSTIPATION: 0
BLOOD IN STOOL: 0
PHOTOPHOBIA: 0
APNEA: 0
SHORTNESS OF BREATH: 0

## 2022-11-18 NOTE — PROGRESS NOTES
work   [] Insomnia  [] No specific triggers   [] Other    Food triggers:   [x] Stress   [x] Boredom   [] Fast food   [] Eating out   [] Seeking reward   [] Social     Have you ever taken prescription medications to help you lose weight? [] Yes  [x] No    Have you ever been on a meal replacement program?  [] Yes  [x] No    How many hours of sleep do you get each night? [x] <6 hours  [] 6-8 hours  [] >8 hours    Do you have sleep apnea? [x] Yes- not on CPAP   [] No   [] Unknown     Do you have frequent awakenings, difficulty falling asleep, feeling fatigued, unrefreshed sleep, daytime sleepiness? [] Yes  [x] No     The patient denies any significant cardiac or psychiatric disease. The patient denies a history thyroid disease. The patient denies a history of glaucoma. The patient denies a history of seizure disorders/epiliepsy. +Kidney stones     Dietitian's assessment reviewed and addressed with patient. Reviewed:  [x] Nutrition and the importance of regular protein intake  [x] Hidden CHO/carbohydrate sources  [x] Alcohol use  [x] Tobacco use  [x] Drug use- Denies   [x] Importance of exercise and reducing sedentary time        Controlled Substance Monitoring:     No flowsheet data found.      No Known Allergies      Current Outpatient Medications:     Multiple Vitamin (MULTIVITAMIN, BARIATRIC FUSION COMPLETE, CHEW TAB), Take 4 tablets by mouth daily, Disp: , Rfl:     etonogestrel-ethinyl estradiol (ELURYNG) 0.12-0.015 MG/24HR vaginal ring, Place 1 each vaginally See Admin Instructions, Disp: 3 each, Rfl: 3    omeprazole (PRILOSEC) 20 MG delayed release capsule, Take 1 capsule by mouth every morning (before breakfast), Disp: 90 capsule, Rfl: 1    omeprazole (PRILOSEC) 20 MG delayed release capsule, Take 1 capsule by mouth Daily, Disp: 30 capsule, Rfl: 3    ondansetron (ZOFRAN) 4 MG tablet, Take 1 tablet by mouth every 6 hours as needed for Nausea or Vomiting, Disp: 30 tablet, Rfl: 0    Patient Active Problem List   Diagnosis    Headache    Edema    Obesity    Vitamin D deficiency    Molar pregnancy    Contusion of right knee    Wrist sprain    Insomnia    Snores    Other fatigue    Moderate episode of recurrent major depressive disorder (HCC)    Generalized anxiety disorder    Tinea corporis    Morbid obesity with BMI of 50.0-59.9, adult (HCC)    Gastritis    Obstructive sleep apnea    Chronic GERD    Dehydration       Past Medical History:   Diagnosis Date    Asthma     controlled    Generalized anxiety disorder     Moderate episode of recurrent major depressive disorder ([facility] [address] 07/19/2018    Morbid obesity with BMI of 50.0-59.9, adult ([facility] [address]     Unspecified vitamin D deficiency 04/13/2011       Past Surgical History:   Procedure Laterality Date    DILATION AND CURETTAGE OF UTERUS  04/19/2013    ENDOSCOPY, COLON, DIAGNOSTIC      Pt denies colonoscopy (9/14/21)    SLEEVE GASTRECTOMY N/A 9/14/2021    ROBOTIC ASSISTED LAPAROSCOPIC SLEEVE GASTRECTOMY performed by Lilo Combs DO at [address] N/A 05/10/2021    EGD BIOPSY performed by Lilo Combs DO at [address] History   Problem Relation Age of Onset    Hypertension Mother     Migraines Mother     Cancer Father     Hypertension Father     Elevated Lipids Father     Diabetes Father     Heart Attack Paternal Grandmother     Hypertension Paternal Grandmother     Rheum Arthritis Neg Hx     Osteoarthritis Neg Hx     Asthma Neg Hx     Breast Cancer Neg Hx     Heart Failure Neg Hx     High Cholesterol Neg Hx     Ovarian Cancer Neg Hx     Rashes/Skin Problems Neg Hx     Seizures Neg Hx     Stroke Neg Hx     Thyroid Disease Neg Hx        Review of Systems   Constitutional:  Negative for fatigue. Eyes:  Negative for photophobia, pain and visual disturbance. Respiratory:  Negative for apnea, cough, choking, chest tightness, shortness of breath and wheezing.     Cardiovascular:  Negative for chest pain, palpitations and leg swelling. Gastrointestinal:  Negative for abdominal distention, abdominal pain, blood in stool, constipation, diarrhea, nausea and vomiting. Endocrine: Negative for cold intolerance and heat intolerance. Musculoskeletal:  Negative for arthralgias and myalgias. Skin:  Negative for rash. Neurological:  Negative for dizziness, tremors, syncope, weakness, numbness and headaches. Psychiatric/Behavioral:  Negative for agitation, confusion, decreased concentration, dysphoric mood, hallucinations, sleep disturbance and suicidal ideas. The patient is not nervous/anxious and is not hyperactive. Physical Exam  Constitutional:       Appearance: She is well-developed. HENT:      Head: Normocephalic. Eyes:      Conjunctiva/sclera: Conjunctivae normal.   Abdominal:      General: Abdomen is protuberant. Musculoskeletal:         General: No swelling. Neurological:      Mental Status: She is alert and oriented to person, place, and time. Psychiatric:         Mood and Affect: Mood normal.         Behavior: Behavior normal.         Thought Content: Thought content normal.         Judgment: Judgment normal.       Office Visit on 02/01/2022   Component Date Value Ref Range Status    C. trachomatis DNA 02/01/2022 Negative  Negative Final    N. gonorrhoeae DNA 02/01/2022 Negative  Negative Final         Assessment and Plan:    1. Morbid obesity with BMI of 40.0-44.9, adult ([facility] [address]  Heavily counseled on the importance of therapeutic lifestyle changes through diet and exercise. The patient understands that the goal of treatment is to reach and stay at a healthy weight. The initial treatment goal is to lose at least 5-10% of her body weight in 12 weeks. This will require changes in eating habits, increased physical activity, and behavior changes. Counseled on low carb/viviana diet. Patient handouts and education material provided and reviewed in detail with the patient. All questions answered.  Encouraged patient to keep a food journal and to bring it to her next visit. Discussed available treatment options in addition to lifestyle changes including medications or OPTIFAST. Anti-obesity medications/(Contrave or Saxenda) may be recommended at the next visit depending on her progress and lab results. Explained that medications/meal replacements are most effective as part of a comprehensive treatment plan that includes proper nutrition, physical activity, and behavior modification. The patient understands that she will need close follow-ups every 2-4 weeks if she starts treatment. Follow-up in 2 weeks to discuss lab results and treatment plan. Patient advised that it is her responsibility to follow up on any ordered tests/lab results. Patient understands and agrees with the plan. - TSH with Reflex; Future  - Vitamin B12 & Folate; Future  - Vitamin D 25 Hydroxy; Future  - Comprehensive Metabolic Panel; Future  - EKG 12 Lead; Future  - Hemoglobin A1C; Future  - Lipid Panel; Future  - CBC; Future    2. Dietary counseling and surveillance  1200-Phi/low carb meal plan. 3. Status post laparoscopic sleeve gastrectomy  Avoid all carbonated drinks. Choose fluids that are sugar-free. Eat small, but frequent meals including a protein source with each meal. Eat meals over 30 minutes, taking small bites and chewing well. Take MVIs as directed. - TSH with Reflex; Future  - Vitamin B12 & Folate; Future  - Vitamin D 25 Hydroxy; Future  - Comprehensive Metabolic Panel; Future  - EKG 12 Lead; Future  - Hemoglobin A1C; Future  - Lipid Panel; Future  - CBC;  Future       Nutrition:  [] LCHF/Ketogenic [x] Low carb/low-calorie diet [] Low-calorie diet     []Maintenance        FITTE:   [x] Cardio [] Resistance/stength exercises   [x] ACSM recommendations (150 minutes/week)           Behavior:   [x] Motivational interviewing performed    [] Referral for counseling  [x] Discussed strategies to overcome habits/challenges for focus      [x] Stress management   [x] Stimulus control  [x] Sleep hygiene      Orders Placed This Encounter   Procedures    TSH with Reflex     Standing Status:   Future     Standing Expiration Date:   11/18/2023    Vitamin B12 & Folate     Standing Status:   Future     Standing Expiration Date:   11/18/2023    Vitamin D 25 Hydroxy     Standing Status:   Future     Standing Expiration Date:   11/18/2023    Comprehensive Metabolic Panel     Standing Status:   Future     Standing Expiration Date:   11/18/2023    Hemoglobin A1C     Standing Status:   Future     Standing Expiration Date:   11/18/2023    Lipid Panel     Standing Status:   Future     Standing Expiration Date:   11/18/2023    CBC     Standing Status:   Future     Standing Expiration Date:   11/18/2023    EKG 12 Lead     Standing Status:   Future     Standing Expiration Date:   11/18/2023     Order Specific Question:   Reason for Exam?     Answer: Other         No follow-ups on file. Leila Saravia is a 27 y.o. female being evaluated by a Virtual Visit (video visit) encounter to address concerns as mentioned above. A caregiver was present when appropriate. Due to this being a TeleHealth encounter ([facility]), evaluation of the following organ systems was limited: Vitals/Constitutional/EENT/Resp/CV/GI//MS/Neuro/Skin/Heme-Lymph-Imm. Pursuant to the emergency declaration under the [address] [place] [address] authority and the [employer] General Act, this Virtual Visit was conducted with patient's (and/or legal guardian's) consent, to reduce the patient's risk of exposure to COVID-19 and provide necessary medical care. The patient (and/or legal guardian) has also been advised to contact this office for worsening conditions or problems, and seek emergency medical treatment and/or call 911 if deemed necessary.        Services were provided through a video synchronous discussion virtually to substitute for in-person clinic visit. Patient and provider were located at their individual homes. --James Weinstein MD on 11/18/2022 at 2:27 PM    An electronic signature was used to authenticate this note.      Greater than 50% of this 45 minute visit was used for  -Preparing to see the patient such as reviewing the pt records   Obtaining and/or reviewing separately obtained history   Performing a medically appropriate history    Counseling and educating the patient, family, and/or caregiver   Ordering prescirption medications, tests, or procedures   Documenting clinical information in the electronic or other health record

## 2022-11-18 NOTE — TELEPHONE ENCOUNTER
Called pt to review and emailed 1200 kcal LCMP.    ----- Message from Yvon Tijerina MD sent at 2022  1:39 PM EST -----  Regardin-Phi/low carb meal plan  Please call and email 1200-Phi/LCMP.  Thank you

## 2022-11-30 ENCOUNTER — HOSPITAL ENCOUNTER (OUTPATIENT)
Age: 32
Discharge: HOME | End: 2022-11-30
Payer: COMMERCIAL

## 2022-11-30 DIAGNOSIS — E66.01 MORBID OBESITY WITH BMI OF 40.0-44.9, ADULT (HCC): ICD-10-CM

## 2022-11-30 DIAGNOSIS — Z98.84 STATUS POST LAPAROSCOPIC SLEEVE GASTRECTOMY: ICD-10-CM

## 2022-11-30 LAB
A/G RATIO: 1.3 (ref 1.1–2.2)
ALBUMIN SERPL-MCNC: 3.9 G/DL (ref 3.4–5)
ALP BLD-CCNC: 69 U/L (ref 40–129)
ALT SERPL-CCNC: 9 U/L (ref 10–40)
ANION GAP SERPL CALCULATED.3IONS-SCNC: 14 MMOL/L (ref 3–16)
AST SERPL-CCNC: 11 U/L (ref 15–37)
BILIRUB SERPL-MCNC: <0.2 MG/DL (ref 0–1)
BUN BLDV-MCNC: 12 MG/DL (ref 7–20)
CALCIUM SERPL-MCNC: 9.6 MG/DL (ref 8.3–10.6)
CHLORIDE BLD-SCNC: 104 MMOL/L (ref 99–110)
CHOLESTEROL, TOTAL: 130 MG/DL (ref 0–199)
CO2: 21 MMOL/L (ref 21–32)
CREAT SERPL-MCNC: 0.6 MG/DL (ref 0.6–1.1)
FOLATE: 8.64 NG/ML (ref 4.78–24.2)
GFR SERPL CREATININE-BSD FRML MDRD: >60 ML/MIN/{1.73_M2}
GLUCOSE BLD-MCNC: 85 MG/DL (ref 70–99)
HCT VFR BLD CALC: 46.7 % (ref 36–48)
HDLC SERPL-MCNC: 56 MG/DL (ref 40–60)
HEMOGLOBIN: 15.2 G/DL (ref 12–16)
LDL CHOLESTEROL CALCULATED: 47 MG/DL
MCH RBC QN AUTO: 29.2 PG (ref 26–34)
MCHC RBC AUTO-ENTMCNC: 32.6 G/DL (ref 31–36)
MCV RBC AUTO: 89.7 FL (ref 80–100)
PDW BLD-RTO: 14.4 % (ref 12.4–15.4)
PLATELET # BLD: 265 K/UL (ref 135–450)
PMV BLD AUTO: 8.4 FL (ref 5–10.5)
POTASSIUM SERPL-SCNC: 4.6 MMOL/L (ref 3.5–5.1)
RBC # BLD: 5.2 M/UL (ref 4–5.2)
SODIUM BLD-SCNC: 139 MMOL/L (ref 136–145)
TOTAL PROTEIN: 6.9 G/DL (ref 6.4–8.2)
TRIGL SERPL-MCNC: 134 MG/DL (ref 0–150)
TSH REFLEX: 1.1 UIU/ML (ref 0.27–4.2)
VITAMIN B-12: 266 PG/ML (ref 211–911)
VITAMIN D 25-HYDROXY: 10.7 NG/ML
VLDLC SERPL CALC-MCNC: 27 MG/DL
WBC # BLD: 12.1 K/UL (ref 4–11)

## 2022-11-30 PROCEDURE — 93005 ELECTROCARDIOGRAM TRACING: CPT

## 2022-11-30 PROCEDURE — 82746 ASSAY OF FOLIC ACID SERUM: CPT

## 2022-11-30 PROCEDURE — 80061 LIPID PANEL: CPT

## 2022-11-30 PROCEDURE — 85027 COMPLETE CBC AUTOMATED: CPT

## 2022-11-30 PROCEDURE — 82306 VITAMIN D 25 HYDROXY: CPT

## 2022-11-30 PROCEDURE — 83036 HEMOGLOBIN GLYCOSYLATED A1C: CPT

## 2022-11-30 PROCEDURE — 82607 VITAMIN B-12: CPT

## 2022-11-30 PROCEDURE — 80053 COMPREHEN METABOLIC PANEL: CPT

## 2022-11-30 PROCEDURE — 36415 COLL VENOUS BLD VENIPUNCTURE: CPT

## 2022-11-30 PROCEDURE — 84443 ASSAY THYROID STIM HORMONE: CPT

## 2022-12-01 LAB
EKG ATRIAL RATE: 80 BPM
EKG DIAGNOSIS: NORMAL
EKG P AXIS: 56 DEGREES
EKG P-R INTERVAL: 178 MS
EKG Q-T INTERVAL: 368 MS
EKG QRS DURATION: 80 MS
EKG QTC CALCULATION (BAZETT): 424 MS
EKG R AXIS: 48 DEGREES
EKG T AXIS: 33 DEGREES
EKG VENTRICULAR RATE: 80 BPM
ESTIMATED AVERAGE GLUCOSE: 105.4 MG/DL
HBA1C MFR BLD: 5.3 %

## 2022-12-01 PROCEDURE — 93010 ELECTROCARDIOGRAM REPORT: CPT | Performed by: INTERNAL MEDICINE

## 2022-12-02 ENCOUNTER — TELEMEDICINE (OUTPATIENT)
Dept: BARIATRICS/WEIGHT MGMT | Age: 32
End: 2022-12-02
Payer: COMMERCIAL

## 2022-12-02 DIAGNOSIS — E55.9 VITAMIN D DEFICIENCY: ICD-10-CM

## 2022-12-02 DIAGNOSIS — E66.01 MORBID OBESITY WITH BMI OF 40.0-44.9, ADULT (HCC): Primary | ICD-10-CM

## 2022-12-02 DIAGNOSIS — Z71.3 DIETARY COUNSELING AND SURVEILLANCE: ICD-10-CM

## 2022-12-02 PROCEDURE — 99214 OFFICE O/P EST MOD 30 MIN: CPT | Performed by: FAMILY MEDICINE

## 2022-12-02 RX ORDER — ERGOCALCIFEROL 1.25 MG/1
50000 CAPSULE ORAL WEEKLY
Qty: 8 CAPSULE | Refills: 0 | Status: SHIPPED | OUTPATIENT
Start: 2022-12-02

## 2022-12-02 RX ORDER — NALTREXONE HYDROCHLORIDE AND BUPROPION HYDROCHLORIDE 8; 90 MG/1; MG/1
TABLET, EXTENDED RELEASE ORAL
Qty: 120 TABLET | Refills: 0 | Status: SHIPPED | OUTPATIENT
Start: 2022-12-02

## 2022-12-02 ASSESSMENT — ENCOUNTER SYMPTOMS
PHOTOPHOBIA: 0
ABDOMINAL PAIN: 0
ABDOMINAL DISTENTION: 0
WHEEZING: 0
APNEA: 0
VOMITING: 0
COUGH: 0
BLOOD IN STOOL: 0
CHOKING: 0
NAUSEA: 0
EYE PAIN: 0
SHORTNESS OF BREATH: 0
CONSTIPATION: 0
CHEST TIGHTNESS: 0
DIARRHEA: 0

## 2022-12-02 NOTE — PROGRESS NOTES
etonogestrel-ethinyl estradiol (ELURYNG) 0.12-0.015 MG/24HR vaginal ring, Place 1 each vaginally See Admin Instructions, Disp: 3 each, Rfl: 3    omeprazole (PRILOSEC) 20 MG delayed release capsule, Take 1 capsule by mouth every morning (before breakfast), Disp: 90 capsule, Rfl: 1    omeprazole (PRILOSEC) 20 MG delayed release capsule, Take 1 capsule by mouth Daily, Disp: 30 capsule, Rfl: 3    ondansetron (ZOFRAN) 4 MG tablet, Take 1 tablet by mouth every 6 hours as needed for Nausea or Vomiting, Disp: 30 tablet, Rfl: 0    Patient Active Problem List   Diagnosis    Headache    Edema    Obesity    Vitamin D deficiency    Molar pregnancy    Contusion of right knee    Wrist sprain    Insomnia    Snores    Other fatigue    Moderate episode of recurrent major depressive disorder (HCC)    Generalized anxiety disorder    Tinea corporis    Morbid obesity with BMI of 50.0-59.9, adult (HCC)    Gastritis    Obstructive sleep apnea    Chronic GERD    Dehydration       Review of Systems   Constitutional:  Negative for fatigue. Eyes:  Negative for photophobia, pain and visual disturbance. Respiratory:  Negative for apnea, cough, choking, chest tightness, shortness of breath and wheezing. Cardiovascular:  Negative for chest pain, palpitations and leg swelling. Gastrointestinal:  Negative for abdominal distention, abdominal pain, blood in stool, constipation, diarrhea, nausea and vomiting. Endocrine: Negative for cold intolerance and heat intolerance. Musculoskeletal:  Negative for arthralgias and myalgias. Skin:  Negative for rash. Neurological:  Negative for dizziness, tremors, syncope, weakness, numbness and headaches. Psychiatric/Behavioral:  Negative for agitation, confusion, decreased concentration, dysphoric mood, hallucinations, sleep disturbance and suicidal ideas. The patient is not nervous/anxious and is not hyperactive. Physical Exam  Constitutional:       Appearance: She is well-developed. HENT:      Head: Normocephalic. Eyes:      Conjunctiva/sclera: Conjunctivae normal.   Abdominal:      General: Abdomen is protuberant. Musculoskeletal:         General: No swelling. Neurological:      Mental Status: She is alert and oriented to person, place, and time. Psychiatric:         Mood and Affect: Mood normal.         Behavior: Behavior normal.         Thought Content: Thought content normal.         Judgment: Judgment normal.       Hospital Outpatient Visit on 11/30/2022   Component Date Value Ref Range Status    WBC 11/30/2022 12.1 (A)  4.0 - 11.0 K/uL Final    RBC 11/30/2022 5.20  4.00 - 5.20 M/uL Final    Hemoglobin 11/30/2022 15.2  12.0 - 16.0 g/dL Final    Hematocrit 11/30/2022 46.7  36.0 - 48.0 % Final    MCV 11/30/2022 89.7  80.0 - 100.0 fL Final    MCH 11/30/2022 29.2  26.0 - 34.0 pg Final    MCHC 11/30/2022 32.6  31.0 - 36.0 g/dL Final    RDW 11/30/2022 14.4  12.4 - 15.4 % Final    Platelets 31/72/7101 265  135 - 450 K/uL Final    MPV 11/30/2022 8.4  5.0 - 10.5 fL Final    Cholesterol, Total 11/30/2022 130  0 - 199 mg/dL Final    Triglycerides 11/30/2022 134  0 - 150 mg/dL Final    HDL 11/30/2022 56  40 - 60 mg/dL Final    Comment: An HDL cholesterol less than 40 mg/dL is low and  constitutes a coronary heart disease risk factor. An HDL cholesterol greater than 60 mg/dL is a  negative risk factor for coronary heart disease.       LDL Calculated 11/30/2022 47  <100 mg/dL Final    VLDL Cholesterol Calculated 11/30/2022 27  Not Established mg/dL Final    Hemoglobin A1C 11/30/2022 5.3  See comment % Final    Comment: Comment:  Diagnosis of Diabetes: > or = 6.5%  Increased risk of diabetes (Prediabetes): 5.7-6.4%  Glycemic Control: Nonpregnant Adults: <7.0%                    Pregnant: <6.0%        eAG 11/30/2022 105.4  mg/dL Final    Sodium 11/30/2022 139  136 - 145 mmol/L Final    Potassium 11/30/2022 4.6  3.5 - 5.1 mmol/L Final    Chloride 11/30/2022 104  99 - 110 mmol/L Final    CO2 11/30/2022 21  21 - 32 mmol/L Final    Anion Gap 11/30/2022 14  3 - 16 Final    Glucose 11/30/2022 85  70 - 99 mg/dL Final    BUN 11/30/2022 12  7 - 20 mg/dL Final    Creatinine 11/30/2022 0.6  0.6 - 1.1 mg/dL Final    Est, Glom Filt Rate 11/30/2022 >60  >60 Final    Comment: Pediatric calculator link  Fely.at. org/professionals/kdoqi/gfr_calculatorped  Effective Oct 3, 2022  These results are not intended for use in patients  <25years of age. eGFR results are calculated without  a race factor using the 2021 CKD-EPI equation. Careful  clinical correlation is recommended, particularly when  comparing to results calculated using previous equations. The CKD-EPI equation is less accurate in patients with  extremes of muscle mass, extra-renal metabolism of  creatinine, excessive creatinine ingestion, or following  therapy that affects renal tubular secretion. Calcium 11/30/2022 9.6  8.3 - 10.6 mg/dL Final    Total Protein 11/30/2022 6.9  6.4 - 8.2 g/dL Final    Albumin 11/30/2022 3.9  3.4 - 5.0 g/dL Final    Albumin/Globulin Ratio 11/30/2022 1.3  1.1 - 2.2 Final    Total Bilirubin 11/30/2022 <0.2  0.0 - 1.0 mg/dL Final    Alkaline Phosphatase 11/30/2022 69  40 - 129 U/L Final    ALT 11/30/2022 9 (A)  10 - 40 U/L Final    AST 11/30/2022 11 (A)  15 - 37 U/L Final    Vit D, 25-Hydroxy 11/30/2022 10.7 (A)  >=30 ng/mL Final    Comment: <=20 ng/mL. ........... Linda Balling Deficient  21-29 ng/mL. ......... Linda Balling Insufficient  >=30 ng/mL. ........ Linda Balling Sufficient      Vitamin B-12 11/30/2022 266  211 - 911 pg/mL Final    Folate 11/30/2022 8.64  4.78 - 24.20 ng/mL Final    Comment: Effective 11-15-16 10:00am EST  Please note reference ranges have  changed for Folate.       TSH 11/30/2022 1.10  0.27 - 4.20 uIU/mL Final    Ventricular Rate 11/30/2022 80  BPM Final    Atrial Rate 11/30/2022 80  BPM Final    P-R Interval 11/30/2022 178  ms Final    QRS Duration 11/30/2022 80  ms Final    Q-T Interval 11/30/2022 368  ms Final    QTc Calculation (Jax) 11/30/2022 424  ms Final    P Axis 11/30/2022 56  degrees Final    R Axis 11/30/2022 48  degrees Final    T Axis 11/30/2022 33  degrees Final    Diagnosis 11/30/2022 Normal sinus rhythmST elevation, consider early repolarizationNormal ECGWhen compared with ECG of 10-SEP-2021 16:21,No significant change was foundConfirmed by [facility] ROSS MD ([id number]) on 12/1/2022 11:18:15 AM   Final         Assessment and Plan:  1. Morbid obesity with BMI of 40.0-44.9, adult ([address]  Discussed risks, benefits and alternatives of Contrave. Patient meets BMI criteria. she denies MAOI use within the past 14 days, is not on any opioids, and does not have a seizure disorder. Start Contrave 8/90 mg. Use as directed. F/u in 2 weeks before titrating up to 3 pills a day. Explained to patient that I will monitor her weight loss every 12 weeks. Goal is to lose at least 5% of her body weight. Counseled patient on proper use and potential side effects including nausea/vomiting, constipation, headache, dizziness, insomnia, xerostomia, diarrhea, anxiety, increased blood pressure, flushing, fatigue, tremors, irritability, rash and/or palpitations. she understands that it is her responsibility to make sure that she does not run out of medications and to follow up to her appointments every 2-4 weeks as recommended. Heavily counseled on the importance of therapeutic lifestyle changes through diet and exercise. Patient is responsible for keeping his monthly appointments. Failure to comply with her monthly visits will result in discontinuing Contrave. Patient advised to report any side effects. 2. Dietary counseling and surveillance  1200-Phi/low carb meal plan. 3. Vitamin D deficiency  Start Vit D 50,000 IU weeky x 8 weeks. Check labs again in 3 months.        Nutrition Plan: [] LCHF/Ketogenic   [x] Modified low-calorie diet (low carb/low-phi)               [] Low-calorie diet    [] Maintenance []Other        Exercise: [x] Cardio     [] Resistance/strength training                       [x] ACSM recommendations (150 minutes/week in active weight loss)               Behavior: [x] Motivational interviewing performed    [] Referral for counseling                         [x] Discussed strategies to overcome habits/challenges for focus         [] Stress management   [x] Stimulus control         [] Sleep hygiene      Reviewed:  [x] Nutrition and the importance of regular protein intake  [x] Hidden carbohydrate sources  [x] Alcohol use  [x] Tobacco use   [x] Drug use- Denies  [x] Importance of exercise and reducing sedentary time  [x] Treatment consent- Patient understands and agrees with the treatment plan   [x] Proper use of medication and side effects  [x] Labs  [x] EKG         Treatment start date: 12/3/22  12 weeks from start of therapeutic dose: 4/3/23  Starting weight:  TBD- will go to office next week for weight check   Goal: TBD- 5% of start weight         Key dietary points:    - Meats (preferably organic or grass fed) are great sources of protein and have no carbohydrates. - Recommend coconut, olive, avocado, or almond oils. - When buying dairy, choose regular or full fat options. - Choose vegetables that grow above ground as they are generally lower in carbohydrates and higher in fiber.  - Avoid starches such as bread, rice, potatoes, pasta and all sources of simple sugars (desserts, soda, breakfast cereals). - Choose beverages that are calorie and sugar free. Reminder regarding weight loss medications: You must be seen in office every 2-4 weeks to haveyour prescriptions refilled. If you are off of your medication for longer than 7 days, you will not be able to restart the medication for at least 6 months. Always call our office to report any side effects. Females, it is your responsibility to obtain negative pregnancy tests each month.     No orders of the defined types were placed in this encounter. No follow-ups on file. Wenda Soulier is a 27 y.o. female being evaluated by a Virtual Visit (video visit) encounter to address concerns as mentioned above. A caregiver was present when appropriate. Due to this being a TeleHealth encounter (During PKEUG-15 public health emergency), evaluation of the following organ systems was limited: Vitals/Constitutional/EENT/Resp/CV/GI//MS/Neuro/Skin/Heme-Lymph-Imm. Pursuant to the emergency declaration under the [address] [place] [address], [address] [place] [address] authority and the [employer] and [employer] Act, this Virtual Visit was conducted with patient's (and/or legal guardian's) consent, to reduce the patient's risk of exposure to COVID-19 and provide necessary medical care. The patient (and/or legal guardian) has also been advised to contact this office for worsening conditions or problems, and seek emergency medical treatment and/or call 911 if deemed necessary.

## 2022-12-13 NOTE — TELEPHONE ENCOUNTER
Pt calling in, asking to have her contrave switched to mail order, place that order.  Sent over to provider

## 2023-01-04 DIAGNOSIS — E55.9 VITAMIN D DEFICIENCY: Primary | ICD-10-CM

## 2023-01-04 NOTE — TELEPHONE ENCOUNTER
Patient was prescribed Vitamin D 50,000iu/weekly. quantity of 8. Pharmacy could only fill 4 capsules at a time. She is now requesting refill of additional 4 capsules.

## 2023-01-04 NOTE — TELEPHONE ENCOUNTER
Spoke with patient  She will contact the office to schedule f/u once meds are received via [employer]

## 2023-01-06 RX ORDER — ERGOCALCIFEROL 1.25 MG/1
50000 CAPSULE ORAL WEEKLY
Qty: 4 CAPSULE | Refills: 0 | Status: SHIPPED | OUTPATIENT
Start: 2023-01-06

## 2023-01-06 NOTE — TELEPHONE ENCOUNTER
Patient also on Contrave (mail order)  Pt instructed to call the office to schedule 2 wk f/u once medication is received  Thanks

## 2023-01-28 ENCOUNTER — TELEPHONE (OUTPATIENT)
Dept: BARIATRICS/WEIGHT MGMT | Age: 33
End: 2023-01-28

## 2023-02-01 ENCOUNTER — TELEPHONE (OUTPATIENT)
Dept: GYNECOLOGY | Age: 33
End: 2023-02-01

## 2023-02-01 ENCOUNTER — TELEMEDICINE (OUTPATIENT)
Dept: BARIATRICS/WEIGHT MGMT | Age: 33
End: 2023-02-01
Payer: COMMERCIAL

## 2023-02-01 DIAGNOSIS — Z71.3 DIETARY COUNSELING AND SURVEILLANCE: ICD-10-CM

## 2023-02-01 DIAGNOSIS — E66.01 MORBID OBESITY WITH BMI OF 45.0-49.9, ADULT (HCC): Primary | ICD-10-CM

## 2023-02-01 PROCEDURE — 99214 OFFICE O/P EST MOD 30 MIN: CPT | Performed by: FAMILY MEDICINE

## 2023-02-01 ASSESSMENT — ENCOUNTER SYMPTOMS
BLOOD IN STOOL: 0
WHEEZING: 0
SHORTNESS OF BREATH: 0
PHOTOPHOBIA: 0
COUGH: 0
CHOKING: 0
ABDOMINAL DISTENTION: 0
APNEA: 0
CHEST TIGHTNESS: 0
CONSTIPATION: 0
ABDOMINAL PAIN: 0
NAUSEA: 0
EYE PAIN: 0
VOMITING: 0
DIARRHEA: 0

## 2023-02-01 NOTE — TELEPHONE ENCOUNTER
If patient is having a problem, then she should keep her appointment for tomorrow at 3 pm.     If she just needs an annual, tell her it will be March or April for a visit. I can see her even if she is on her period for a problem visit.

## 2023-02-01 NOTE — TELEPHONE ENCOUNTER
Patient phone number [phone]    Patient called needs to reschedule due to she is on her cycle & its heavy  and wants to be seen soon as she done please reach to patient to advise .

## 2023-02-01 NOTE — PROGRESS NOTES
Patient: Sharonda Reece                      Encounter Date: 2/1/2023    YOB: 1990                Age: 27 y.o. Chief Complaint   Patient presents with    Weight Management     F/u MWM         Patient identification was verified at the start of the visit. Patient-Reported Vitals 2/1/2023   Patient-Reported Weight 315.2   Patient-Reported Height 59   Patient-Reported Systolic 737   Patient-Reported Diastolic 68   Patient-Reported Pulse 90   Patient-Reported Temperature 97.2   Patient-Reported SpO2 99%         BP Readings from Last 1 Encounters:   09/07/22 128/88       BMI Readings from Last 1 Encounters:   09/07/22 45.70 kg/m²       Pulse Readings from Last 1 Encounters:   09/07/22 89     Wt Readings from Last 3 Encounters:   09/07/22 (!) 314 lb (142.4 kg)   06/06/22 (!) 308 lb (139.7 kg)   03/02/22 (!) 314 lb (142.4 kg)         HPI: 27 y.o. female with a long-standing history of obesity s/p sleeve gastrectomy in September 2021 presents today for virtual video follow-up. She has gained 8 pounds since her last visit in December. Current treatment includes low carb/viviana diet. She started Contrave 2 weeks ago. Currently on 2 pills/day. No issues. Medication(s): Appetite well controlled? [x]Yes      []No    Focus:     []Good     [x]Fair     []Poor    Side effects? Occasional nausea         Any recent change in medication(s)?  No      No Known Allergies      Current Outpatient Medications:     naltrexone-buPROPion (CONTRAVE) 8-90 MG per extended release tablet, Take 2 tablets by mouth 2 times daily Week1:1 tab morning x7days week 2:1tab morning, 1 tab evening x7days, week3: 2 tab morning, 1tab morning x7days, week4: 2tab morning, 2tab evening, Disp: 120 tablet, Rfl: 0    vitamin D (ERGOCALCIFEROL) 1.25 MG (66131 UT) CAPS capsule, Take 1 capsule by mouth once a week, Disp: 4 capsule, Rfl: 0    Multiple Vitamin (MULTIVITAMIN, BARIATRIC FUSION COMPLETE, CHEW TAB), Take 4 tablets by mouth daily, Disp: , Rfl:     etonogestrel-ethinyl estradiol (ELURYNG) 0.12-0.015 MG/24HR vaginal ring, Place 1 each vaginally See Admin Instructions, Disp: 3 each, Rfl: 3    omeprazole (PRILOSEC) 20 MG delayed release capsule, Take 1 capsule by mouth every morning (before breakfast), Disp: 90 capsule, Rfl: 1    omeprazole (PRILOSEC) 20 MG delayed release capsule, Take 1 capsule by mouth Daily, Disp: 30 capsule, Rfl: 3    ondansetron (ZOFRAN) 4 MG tablet, Take 1 tablet by mouth every 6 hours as needed for Nausea or Vomiting, Disp: 30 tablet, Rfl: 0    Patient Active Problem List   Diagnosis    Headache    Edema    Obesity    Vitamin D deficiency    Molar pregnancy    Contusion of right knee    Wrist sprain    Insomnia    Snores    Other fatigue    Moderate episode of recurrent major depressive disorder (HCC)    Generalized anxiety disorder    Tinea corporis    Morbid obesity with BMI of 50.0-59.9, adult (HCC)    Gastritis    Obstructive sleep apnea    Chronic GERD    Dehydration       Review of Systems   Constitutional:  Negative for fatigue.   Eyes:  Negative for photophobia, pain and visual disturbance.   Respiratory:  Negative for apnea, cough, choking, chest tightness, shortness of breath and wheezing.    Cardiovascular:  Negative for chest pain, palpitations and leg swelling.   Gastrointestinal:  Negative for abdominal distention, abdominal pain, blood in stool, constipation, diarrhea, nausea and vomiting.   Endocrine: Negative for cold intolerance and heat intolerance.   Musculoskeletal:  Negative for arthralgias and myalgias.   Skin:  Negative for rash.   Neurological:  Negative for dizziness, tremors, syncope, weakness, numbness and headaches.   Psychiatric/Behavioral:  Negative for agitation, confusion, decreased concentration, dysphoric mood, hallucinations, sleep disturbance and suicidal ideas. The patient is not nervous/anxious and is not hyperactive.      Physical Exam  Constitutional:       Appearance: She is  well-developed. HENT:      Head: Normocephalic. Eyes:      Conjunctiva/sclera: Conjunctivae normal.   Abdominal:      General: Abdomen is protuberant. Musculoskeletal:         General: No swelling. Neurological:      Mental Status: She is alert and oriented to person, place, and time. Psychiatric:         Mood and Affect: Mood normal.         Behavior: Behavior normal.         Thought Content: Thought content normal.         Judgment: Judgment normal.       Hospital Outpatient Visit on 11/30/2022   Component Date Value Ref Range Status    WBC 11/30/2022 12.1 (A)  4.0 - 11.0 K/uL Final    RBC 11/30/2022 5.20  4.00 - 5.20 M/uL Final    Hemoglobin 11/30/2022 15.2  12.0 - 16.0 g/dL Final    Hematocrit 11/30/2022 46.7  36.0 - 48.0 % Final    MCV 11/30/2022 89.7  80.0 - 100.0 fL Final    MCH 11/30/2022 29.2  26.0 - 34.0 pg Final    MCHC 11/30/2022 32.6  31.0 - 36.0 g/dL Final    RDW 11/30/2022 14.4  12.4 - 15.4 % Final    Platelets 41/02/4026 265  135 - 450 K/uL Final    MPV 11/30/2022 8.4  5.0 - 10.5 fL Final    Cholesterol, Total 11/30/2022 130  0 - 199 mg/dL Final    Triglycerides 11/30/2022 134  0 - 150 mg/dL Final    HDL 11/30/2022 56  40 - 60 mg/dL Final    Comment: An HDL cholesterol less than 40 mg/dL is low and  constitutes a coronary heart disease risk factor. An HDL cholesterol greater than 60 mg/dL is a  negative risk factor for coronary heart disease.       LDL Calculated 11/30/2022 47  <100 mg/dL Final    VLDL Cholesterol Calculated 11/30/2022 27  Not Established mg/dL Final    Hemoglobin A1C 11/30/2022 5.3  See comment % Final    Comment: Comment:  Diagnosis of Diabetes: > or = 6.5%  Increased risk of diabetes (Prediabetes): 5.7-6.4%  Glycemic Control: Nonpregnant Adults: <7.0%                    Pregnant: <6.0%        eAG 11/30/2022 105.4  mg/dL Final    Sodium 11/30/2022 139  136 - 145 mmol/L Final    Potassium 11/30/2022 4.6  3.5 - 5.1 mmol/L Final    Chloride 11/30/2022 104  99 - 110 mmol/L Final    CO2 11/30/2022 21  21 - 32 mmol/L Final    Anion Gap 11/30/2022 14  3 - 16 Final    Glucose 11/30/2022 85  70 - 99 mg/dL Final    BUN 11/30/2022 12  7 - 20 mg/dL Final    Creatinine 11/30/2022 0.6  0.6 - 1.1 mg/dL Final    Est, Glom Filt Rate 11/30/2022 >60  >60 Final    Comment: Pediatric calculator link  Fely.at. org/professionals/kdoqi/gfr_calculatorped  Effective Oct 3, 2022  These results are not intended for use in patients  <25years of age. eGFR results are calculated without  a race factor using the 2021 CKD-EPI equation. Careful  clinical correlation is recommended, particularly when  comparing to results calculated using previous equations. The CKD-EPI equation is less accurate in patients with  extremes of muscle mass, extra-renal metabolism of  creatinine, excessive creatinine ingestion, or following  therapy that affects renal tubular secretion. Calcium 11/30/2022 9.6  8.3 - 10.6 mg/dL Final    Total Protein 11/30/2022 6.9  6.4 - 8.2 g/dL Final    Albumin 11/30/2022 3.9  3.4 - 5.0 g/dL Final    Albumin/Globulin Ratio 11/30/2022 1.3  1.1 - 2.2 Final    Total Bilirubin 11/30/2022 <0.2  0.0 - 1.0 mg/dL Final    Alkaline Phosphatase 11/30/2022 69  40 - 129 U/L Final    ALT 11/30/2022 9 (A)  10 - 40 U/L Final    AST 11/30/2022 11 (A)  15 - 37 U/L Final    Vit D, 25-Hydroxy 11/30/2022 10.7 (A)  >=30 ng/mL Final    Comment: <=20 ng/mL. ........... Kiarra Quiver Deficient  21-29 ng/mL. ......... Kiarra Quiver Insufficient  >=30 ng/mL. ........ Kiarra Quiver Sufficient      Vitamin B-12 11/30/2022 266  211 - 911 pg/mL Final    Folate 11/30/2022 8.64  4.78 - 24.20 ng/mL Final    Comment: Effective 11-15-16 10:00am EST  Please note reference ranges have  changed for Folate.       TSH 11/30/2022 1.10  0.27 - 4.20 uIU/mL Final    Ventricular Rate 11/30/2022 80  BPM Final    Atrial Rate 11/30/2022 80  BPM Final    P-R Interval 11/30/2022 178  ms Final    QRS Duration 11/30/2022 80  ms Final    Q-T Interval 11/30/2022 368  ms Final QTc Calculation (Bazett) 11/30/2022 424  ms Final    P Axis 11/30/2022 56  degrees Final    R Axis 11/30/2022 48  degrees Final    T Axis 11/30/2022 33  degrees Final    Diagnosis 11/30/2022 Normal sinus rhythmST elevation, consider early repolarizationNormal ECGWhen compared with ECG of 10-SEP-2021 16:21,No significant change was foundConfirmed by [facility] ROSS MD ([id number]) on 12/1/2022 11:18:15 AM   Final         Assessment and Plan:  1. Morbid obesity with BMI of 45.0-49.9, adult (HCC)  Stable, not at goal.   Continue current management- increase Contrave as prescribed. In-office weight check this week. F/u 2 weeks. 2. Dietary counseling and surveillance  [id number]/low carb meal plan. Nutrition Plan: [] LCHF/Ketogenic   [x] Modified low-calorie diet (low carb/low-viviana)               [] Low-calorie diet    [] Maintenance       []Other        Exercise: [x] Cardio     [x] Resistance/strength training                       [x] ACSM recommendations (150 minutes/week in active weight loss)               Behavior: [x] Motivational interviewing performed    [] Referral for counseling                         [x] Discussed strategies to overcome habits/challenges for focus         [] Stress management   [x] Stimulus control         [] Sleep hygiene      Reviewed:  [x] Nutrition and the importance of regular protein intake  [x] Hidden carbohydrate sources  [x] Alcohol use  [x] Tobacco use   [x] Drug use- Denies  [x] Importance of exercise and reducing sedentary time  [x] Treatment consent- Patient understands and agrees with the treatment plan   [x] Proper use of medication and side effects      Controlled Substance Monitoring:    No flowsheet data found.          Treatment start date: 1/23/23  12 weeks from start of therapeutic dose: 5/23/23  Starting weight:  TBD- will go to office next week for weight check (home scale 315.2)  Goal: TBD- 5% of start weight  Total weight loss: 0 pounds           Key dietary points:    - Meats (preferably organic or grass fed) are great sources of protein and have no carbohydrates. - Recommend coconut, olive, avocado, or almond oils. - When buying dairy, choose regular or full fat options. - Choose vegetables that grow above ground as they are generally lower in carbohydrates and higher in fiber.  - Avoid starches such as bread, rice, potatoes, pasta and all sources of simple sugars (desserts, soda, breakfast cereals). - Choose beverages that are calorie and sugar free. Reminder regarding weight loss medications: You must be seen in office every 2-4 weeks to haveyour prescriptions refilled. If you are off of your medication for longer than 7 days, you will not be able to restart the medication for at least 6 months. Always call our office to report any side effects. Females, it is your responsibility to obtain negative pregnancy tests each month. No orders of the defined types were placed in this encounter. No follow-ups on file. Graciela Esparza is a 27 y.o. female being evaluated by a Virtual Visit (video visit) encounter to address concerns as mentioned above. A caregiver was present when appropriate. Due to this being a TeleHealth encounter evaluation of the following organ systems was limited: Vitals/Constitutional/EENT/Resp/CV/GI//MS/Neuro/Skin/Heme-Lymph-Imm. Graciela Esparza, was evaluated through a synchronous (real-time) audio-video encounter. The patient (or guardian if applicable) is aware that this is a billable service, which includes applicable co-pays. This Virtual Visit was conducted with patient's (and/or legal guardian's) consent. The visit was conducted pursuant to the emergency declaration under the [facility], [address] [place] [address] [facility] authority and the [employer] and [employer] Act. Patient identification was verified, and a caregiver was present when appropriate. The patient was located at home  Provider was located at home         Total time spent for this encounter: Not billed by time     --Erika Gaines MD on 2/1/2023 at 3:30 PM    An electronic signature was used to authenticate this note.

## 2023-02-13 VITALS — WEIGHT: 293 LBS | HEIGHT: 70 IN | BODY MASS INDEX: 41.95 KG/M2

## 2023-02-18 ENCOUNTER — TELEMEDICINE (OUTPATIENT)
Dept: BARIATRICS/WEIGHT MGMT | Age: 33
End: 2023-02-18
Payer: COMMERCIAL

## 2023-02-18 DIAGNOSIS — Z71.3 DIETARY COUNSELING AND SURVEILLANCE: ICD-10-CM

## 2023-02-18 DIAGNOSIS — E66.01 MORBID OBESITY WITH BMI OF 45.0-49.9, ADULT (HCC): Primary | ICD-10-CM

## 2023-02-18 PROCEDURE — 99214 OFFICE O/P EST MOD 30 MIN: CPT | Performed by: FAMILY MEDICINE

## 2023-02-18 RX ORDER — NALTREXONE HYDROCHLORIDE AND BUPROPION HYDROCHLORIDE 8; 90 MG/1; MG/1
2 TABLET, EXTENDED RELEASE ORAL 2 TIMES DAILY
Qty: 120 TABLET | Refills: 0 | Status: SHIPPED | OUTPATIENT
Start: 2023-02-18

## 2023-02-18 ASSESSMENT — ENCOUNTER SYMPTOMS
NAUSEA: 0
CONSTIPATION: 0
CHOKING: 0
DIARRHEA: 0
VOMITING: 0
COUGH: 0
ABDOMINAL DISTENTION: 0
BLOOD IN STOOL: 0
CHEST TIGHTNESS: 0
EYE PAIN: 0
PHOTOPHOBIA: 0
WHEEZING: 0
APNEA: 0
SHORTNESS OF BREATH: 0
ABDOMINAL PAIN: 0

## 2023-02-18 NOTE — PROGRESS NOTES
Patient: Regi Rashid                      Encounter Date: 2/18/2023    YOB: 1990                Age: 27 y.o. Chief Complaint   Patient presents with    Weight Management     F/u MWM           Patient identification was verified at the start of the visit. Patient-Reported Vitals 2/18/2023   Patient-Reported Weight 314   Patient-Reported Height 59   Patient-Reported Systolic 349   Patient-Reported Diastolic 88   Patient-Reported Pulse 89   Patient-Reported Temperature 98.2   Patient-Reported SpO2 98         BP Readings from Last 1 Encounters:   09/07/22 128/88       BMI Readings from Last 1 Encounters:   02/13/23 46.05 kg/m²       Pulse Readings from Last 1 Encounters:   09/07/22 89     Wt Readings from Last 3 Encounters:   02/13/23 (!) 316 lb 6.4 oz (143.5 kg)   09/07/22 (!) 314 lb (142.4 kg)   06/06/22 (!) 308 lb (139.7 kg)          HPI: 27 y.o. female with a long-standing history of obesity s/p sleeve gastrectomy in September 2021 presents today for virtual video follow-up. She has lost 2 pounds since her last visit. Current treatment includes Contrave and low carb/viviana diet. Motivated to continue losing weight. Medication(s): Appetite well controlled? [x]Yes      []No                          Focus:     []Good     [x]Fair     []Poor                          Side effects? Occasional headaches       Any recent change in medication(s)?  No       Exercise: []Cardio     []Resistance/strength training     [x]Other: Starling Blase during lunch break ~15 minutes/day     No Known Allergies      Current Outpatient Medications:     naltrexone-buPROPion (CONTRAVE) 8-90 MG per extended release tablet, Take 2 tablets by mouth 2 times daily, Disp: 120 tablet, Rfl: 0    vitamin D (ERGOCALCIFEROL) 1.25 MG (92597 UT) CAPS capsule, Take 1 capsule by mouth once a week, Disp: 4 capsule, Rfl: 0    Multiple Vitamin (MULTIVITAMIN, BARIATRIC FUSION COMPLETE, CHEW TAB), Take 4 tablets by mouth daily, Disp: , Rfl: etonogestrel-ethinyl estradiol (ELURYNG) 0.12-0.015 MG/24HR vaginal ring, Place 1 each vaginally See Admin Instructions, Disp: 3 each, Rfl: 3    omeprazole (PRILOSEC) 20 MG delayed release capsule, Take 1 capsule by mouth every morning (before breakfast), Disp: 90 capsule, Rfl: 1    omeprazole (PRILOSEC) 20 MG delayed release capsule, Take 1 capsule by mouth Daily, Disp: 30 capsule, Rfl: 3    ondansetron (ZOFRAN) 4 MG tablet, Take 1 tablet by mouth every 6 hours as needed for Nausea or Vomiting, Disp: 30 tablet, Rfl: 0    Patient Active Problem List   Diagnosis    Headache    Edema    Obesity    Vitamin D deficiency    Molar pregnancy    Contusion of right knee    Wrist sprain    Insomnia    Snores    Other fatigue    Moderate episode of recurrent major depressive disorder (HCC)    Generalized anxiety disorder    Tinea corporis    Morbid obesity with BMI of 50.0-59.9, adult (HCC)    Gastritis    Obstructive sleep apnea    Chronic GERD    Dehydration       Review of Systems   Constitutional:  Negative for fatigue. Eyes:  Negative for photophobia, pain and visual disturbance. Respiratory:  Negative for apnea, cough, choking, chest tightness, shortness of breath and wheezing. Cardiovascular:  Negative for chest pain, palpitations and leg swelling. Gastrointestinal:  Negative for abdominal distention, abdominal pain, blood in stool, constipation, diarrhea, nausea and vomiting. Endocrine: Negative for cold intolerance and heat intolerance. Musculoskeletal:  Negative for arthralgias and myalgias. Skin:  Negative for rash. Neurological:  Negative for dizziness, tremors, syncope, weakness, numbness and headaches. Psychiatric/Behavioral:  Negative for agitation, confusion, decreased concentration, dysphoric mood, hallucinations, sleep disturbance and suicidal ideas. The patient is not nervous/anxious and is not hyperactive. Physical Exam  Constitutional:       Appearance: She is well-developed. HENT:      Head: Normocephalic. Eyes:      Conjunctiva/sclera: Conjunctivae normal.   Abdominal:      General: Abdomen is protuberant. Musculoskeletal:         General: No swelling. Neurological:      Mental Status: She is alert and oriented to person, place, and time. Psychiatric:         Mood and Affect: Mood normal.         Behavior: Behavior normal.         Thought Content: Thought content normal.         Judgment: Judgment normal.       Hospital Outpatient Visit on 11/30/2022   Component Date Value Ref Range Status    WBC 11/30/2022 12.1 (A)  4.0 - 11.0 K/uL Final    RBC 11/30/2022 5.20  4.00 - 5.20 M/uL Final    Hemoglobin 11/30/2022 15.2  12.0 - 16.0 g/dL Final    Hematocrit 11/30/2022 46.7  36.0 - 48.0 % Final    MCV 11/30/2022 89.7  80.0 - 100.0 fL Final    MCH 11/30/2022 29.2  26.0 - 34.0 pg Final    MCHC 11/30/2022 32.6  31.0 - 36.0 g/dL Final    RDW 11/30/2022 14.4  12.4 - 15.4 % Final    Platelets 97/30/3100 265  135 - 450 K/uL Final    MPV 11/30/2022 8.4  5.0 - 10.5 fL Final    Cholesterol, Total 11/30/2022 130  0 - 199 mg/dL Final    Triglycerides 11/30/2022 134  0 - 150 mg/dL Final    HDL 11/30/2022 56  40 - 60 mg/dL Final    Comment: An HDL cholesterol less than 40 mg/dL is low and  constitutes a coronary heart disease risk factor. An HDL cholesterol greater than 60 mg/dL is a  negative risk factor for coronary heart disease.       LDL Calculated 11/30/2022 47  <100 mg/dL Final    VLDL Cholesterol Calculated 11/30/2022 27  Not Established mg/dL Final    Hemoglobin A1C 11/30/2022 5.3  See comment % Final    Comment: Comment:  Diagnosis of Diabetes: > or = 6.5%  Increased risk of diabetes (Prediabetes): 5.7-6.4%  Glycemic Control: Nonpregnant Adults: <7.0%                    Pregnant: <6.0%        eAG 11/30/2022 105.4  mg/dL Final    Sodium 11/30/2022 139  136 - 145 mmol/L Final    Potassium 11/30/2022 4.6  3.5 - 5.1 mmol/L Final    Chloride 11/30/2022 104  99 - 110 mmol/L Final    CO2 11/30/2022 21  21 - 32 mmol/L Final    Anion Gap 11/30/2022 14  3 - 16 Final    Glucose 11/30/2022 85  70 - 99 mg/dL Final    BUN 11/30/2022 12  7 - 20 mg/dL Final    Creatinine 11/30/2022 0.6  0.6 - 1.1 mg/dL Final    Est, Glom Filt Rate 11/30/2022 >60  >60 Final    Comment: Pediatric calculator link  Fely.at. org/professionals/kdoqi/gfr_calculatorped  Effective Oct 3, 2022  These results are not intended for use in patients  <25years of age. eGFR results are calculated without  a race factor using the 2021 CKD-EPI equation. Careful  clinical correlation is recommended, particularly when  comparing to results calculated using previous equations. The CKD-EPI equation is less accurate in patients with  extremes of muscle mass, extra-renal metabolism of  creatinine, excessive creatinine ingestion, or following  therapy that affects renal tubular secretion. Calcium 11/30/2022 9.6  8.3 - 10.6 mg/dL Final    Total Protein 11/30/2022 6.9  6.4 - 8.2 g/dL Final    Albumin 11/30/2022 3.9  3.4 - 5.0 g/dL Final    Albumin/Globulin Ratio 11/30/2022 1.3  1.1 - 2.2 Final    Total Bilirubin 11/30/2022 <0.2  0.0 - 1.0 mg/dL Final    Alkaline Phosphatase 11/30/2022 69  40 - 129 U/L Final    ALT 11/30/2022 9 (A)  10 - 40 U/L Final    AST 11/30/2022 11 (A)  15 - 37 U/L Final    Vit D, 25-Hydroxy 11/30/2022 10.7 (A)  >=30 ng/mL Final    Comment: <=20 ng/mL. ........... Hardik Bethelridge Deficient  21-29 ng/mL. ......... Hardik Bethelridge Insufficient  >=30 ng/mL. ........ Hardik Bethelridge Sufficient      Vitamin B-12 11/30/2022 266  211 - 911 pg/mL Final    Folate 11/30/2022 8.64  4.78 - 24.20 ng/mL Final    Comment: Effective 11-15-16 10:00am EST  Please note reference ranges have  changed for Folate.       TSH 11/30/2022 1.10  0.27 - 4.20 uIU/mL Final    Ventricular Rate 11/30/2022 80  BPM Final    Atrial Rate 11/30/2022 80  BPM Final    P-R Interval 11/30/2022 178  ms Final    QRS Duration 11/30/2022 80  ms Final    Q-T Interval 11/30/2022 368  ms Final    QTc Calculation (Bazett) 11/30/2022 424  ms Final    P Axis 11/30/2022 56  degrees Final    R Axis 11/30/2022 48  degrees Final    T Axis 11/30/2022 33  degrees Final    Diagnosis 11/30/2022 Normal sinus rhythmST elevation, consider early repolarizationNormal ECGWhen compared with ECG of 10-SEP-2021 16:21,No significant change was foundConfirmed by [facility] ROSS MD ([id number]) on 12/1/2022 11:18:15 AM   Final         Assessment and Plan:  1. Morbid obesity with BMI of 45.0-49.9, adult (HCC)  Improving, not at goal.  Continue current management- Contrave (refill provided), low carb/viviana diet. Increase exercise. F/u 4 weeks. 2. Dietary counseling and surveillance  Low carb/viviana diet. Encouraged adequate hydration to help with headaches. F/u if no improvement. Avoid skipping meals. Avoid evening/nighttime snacking. Use distraction techniques to avoid boredom/stress eating. Plan/prep meals ahead of time. Avoid soda/juice/sugary drinks. Be mindful of portion sizes.        Nutrition Plan: [] LCHF/Ketogenic   [x] Modified low-calorie diet (low carb/low-viviana)               [] Low-calorie diet    [] Maintenance       []Other        Exercise: [x] Cardio     [x] Resistance/strength training                       [x] ACSM recommendations (150 minutes/week in active weight loss)               Behavior: [x] Motivational interviewing performed    [] Referral for counseling                         [x] Discussed strategies to overcome habits/challenges for focus         [] Stress management   [x] Stimulus control         [] Sleep hygiene      Reviewed:  [x] Nutrition and the importance of regular protein intake  [x] Hidden carbohydrate sources  [x] Alcohol use  [x] Tobacco use   [x] Drug use- Denies  [x] Importance of exercise and reducing sedentary time  [x] Treatment consent- Patient understands and agrees with the treatment plan   [x] Proper use of medication and side effects      Treatment start date: 1/23/23  12 weeks from start of therapeutic dose: 5/23/23  Starting weight:  316 pounds (office scale)  (home scale 315.2 pounds)  Goal: At least 5% (15.5 pounds)   Total weight loss: 2 pounds         Key dietary points:    - Meats (preferably organic or grass fed) are great sources of protein and have no carbohydrates. - Recommend coconut, olive, avocado, or almond oils. - When buying dairy, choose regular or full fat options. - Choose vegetables that grow above ground as they are generally lower in carbohydrates and higher in fiber.  - Avoid starches such as bread, rice, potatoes, pasta and all sources of simple sugars (desserts, soda, breakfast cereals). - Choose beverages that are calorie and sugar free. Reminder regarding weight loss medications: You must be seen in office every 2-4 weeks to haveyour prescriptions refilled. If you are off of your medication for longer than 7 days, you will not be able to restart the medication for at least 6 months. Always call our office to report any side effects. Females, it is your responsibility to obtain negative pregnancy tests each month. No orders of the defined types were placed in this encounter. No follow-ups on file. Antonina Lucas is a 27 y.o. female being evaluated by a Virtual Visit (video visit) encounter to address concerns as mentioned above. A caregiver was present when appropriate. Due to this being a TeleHealth encounter evaluation of the following organ systems was limited: Vitals/Constitutional/EENT/Resp/CV/GI//MS/Neuro/Skin/Heme-Lymph-Imm. Antonina Lcuas, was evaluated through a synchronous (real-time) audio-video encounter. The patient (or guardian if applicable) is aware that this is a billable service, which includes applicable co-pays. This Virtual Visit was conducted with patient's (and/or legal guardian's) consent.  The visit was conducted pursuant to the emergency declaration under the [facility] Act, [facility] waiver authority and the [employer] General Act. Patient identification was verified, and a caregiver was present when appropriate. The patient was located at Home: [address] [address]  Provider was located at Home ([address] [facility] [place]         Total time spent for this encounter: Not billed by time    --Lewis Schulte MD on 2/18/2023 at 11:56 AM    An electronic signature was used to authenticate this note.

## 2023-03-15 ENCOUNTER — OFFICE VISIT (OUTPATIENT)
Dept: GYNECOLOGY | Age: 33
End: 2023-03-15
Payer: COMMERCIAL

## 2023-03-15 VITALS
HEIGHT: 69 IN | SYSTOLIC BLOOD PRESSURE: 129 MMHG | BODY MASS INDEX: 43.4 KG/M2 | DIASTOLIC BLOOD PRESSURE: 79 MMHG | HEART RATE: 93 BPM | RESPIRATION RATE: 17 BRPM | OXYGEN SATURATION: 98 % | WEIGHT: 293 LBS

## 2023-03-15 DIAGNOSIS — Z01.419 WELL WOMAN EXAM WITH ROUTINE GYNECOLOGICAL EXAM: Primary | ICD-10-CM

## 2023-03-15 DIAGNOSIS — Z30.49 ENCOUNTER FOR SURVEILLANCE OF OTHER CONTRACEPTIVE: ICD-10-CM

## 2023-03-15 PROCEDURE — 99395 PREV VISIT EST AGE 18-39: CPT | Performed by: OBSTETRICS & GYNECOLOGY

## 2023-03-15 RX ORDER — ETONOGESTREL AND ETHINYL ESTRADIOL 11.7; 2.7 MG/1; MG/1
1 INSERT, EXTENDED RELEASE VAGINAL SEE ADMIN INSTRUCTIONS
Qty: 3 EACH | Refills: 3 | Status: SHIPPED | OUTPATIENT
Start: 2023-03-15

## 2023-03-16 ASSESSMENT — ENCOUNTER SYMPTOMS
EYES NEGATIVE: 1
ALLERGIC/IMMUNOLOGIC NEGATIVE: 1
GASTROINTESTINAL NEGATIVE: 1
RESPIRATORY NEGATIVE: 1

## 2023-03-16 NOTE — PROGRESS NOTES
Subjective:      Patient ID: Dyana Dowling is a 27 y.o. female. Patient is here for annual.     Gynecologic Exam      Review of Systems   Constitutional: Negative. HENT: Negative. Eyes: Negative. Respiratory: Negative. Cardiovascular: Negative. Gastrointestinal: Negative. Endocrine: Negative. Genitourinary: Negative. Musculoskeletal: Negative. Skin: Negative. Allergic/Immunologic: Negative. Neurological: Negative. Hematological: Negative. Psychiatric/Behavioral: Negative.        Date of Birth 1990  Past Medical History:   Diagnosis Date    Asthma     controlled    Generalized anxiety disorder     Moderate episode of recurrent major depressive disorder ([facility] [address] 2018    Morbid obesity with BMI of 50.0-59.9, adult ([facility] [address]     Unspecified vitamin D deficiency 2011     Past Surgical History:   Procedure Laterality Date    DILATION AND CURETTAGE OF UTERUS  2013    ENDOSCOPY, COLON, DIAGNOSTIC      Pt denies colonoscopy (21)    SLEEVE GASTRECTOMY N/A 2021    ROBOTIC ASSISTED LAPAROSCOPIC SLEEVE GASTRECTOMY performed by Carolyn Robles DO at [address] [place] [address] N/A 05/10/2021    EGD BIOPSY performed by Carolyn Robles DO at [facility] ENDOSCOPY     OB History    Para Term  AB Living   4 1 1 0 2 1   SAB IAB Ectopic Molar Multiple Live Births   0 2 0   0 1      # Outcome Date GA Lbr Be/2nd Weight Sex Delivery Anes PTL Lv   4          FD   3 Term  37w0d   F Vag-Spont   RANCHO   2 IAB         FD   1 IAB         FD     Social History     Socioeconomic History    Marital status: Single     Spouse name: Not on file    Number of children: Not on file    Years of education: Not on file    Highest education level: Not on file   Occupational History    Not on file   Tobacco Use    Smoking status: Never    Smokeless tobacco: Never   Vaping Use    Vaping Use: Never used   Substance and Sexual Activity    Alcohol use: Yes     Comment: rare    Drug use: No    Sexual activity: Yes     Partners: Male   Other Topics Concern    Not on file   Social History Narrative    Not on file     Social Determinants of Health     Financial Resource Strain: Not on file   Food Insecurity: Not on file   Transportation Needs: Not on file   Physical Activity: Not on file   Stress: Not on file   Social Connections: Not on file   Intimate Partner Violence: Not on file   Housing Stability: Not on file     No Known Allergies  Outpatient Medications Marked as Taking for the 3/15/23 encounter (Office Visit) with Robe Live MD   Medication Sig Dispense Refill    etonogestrel-ethinyl estradiol [place]) 0.12-0.015 MG/24HR vaginal ring Place 1 each vaginally See Admin Instructions 3 each 3    naltrexone-buPROPion (CONTRAVE) 8-90 MG per extended release tablet Take 2 tablets by mouth 2 times daily 120 tablet 0    vitamin D (ERGOCALCIFEROL) 1.25 MG (78538 UT) CAPS capsule Take 1 capsule by mouth once a week 4 capsule 0    Multiple Vitamin (MULTIVITAMIN, BARIATRIC FUSION COMPLETE, CHEW TAB) Take 4 tablets by mouth daily      ondansetron (ZOFRAN) 4 MG tablet Take 1 tablet by mouth every 6 hours as needed for Nausea or Vomiting 30 tablet 0     Family History   Problem Relation Age of Onset    Hypertension Mother     Migraines Mother     Cancer Father     Hypertension Father     Elevated Lipids Father     Diabetes Father     Heart Attack Paternal Grandmother     Hypertension Paternal Grandmother     Rheum Arthritis Neg Hx     Osteoarthritis Neg Hx     Asthma Neg Hx     Breast Cancer Neg Hx     Heart Failure Neg Hx     High Cholesterol Neg Hx     Ovarian Cancer Neg Hx     Rashes/Skin Problems Neg Hx     Seizures Neg Hx     Stroke Neg Hx     Thyroid Disease Neg Hx      /79 (Site: Right Upper Arm, Position: Sitting, Cuff Size: Large Adult)   Pulse 93   Resp 17   Ht 5' 9\" (1.753 m)   Wt (!) 321 lb 9.6 oz (145.9 kg)   LMP 02/28/2023   SpO2 98%   BMI 47.49 kg/m² Objective:   Physical Exam  Constitutional:       Appearance: Normal appearance. She is well-developed and normal weight. HENT:      Head: Normocephalic and atraumatic. Nose: Nose normal.      Mouth/Throat:      Mouth: Mucous membranes are moist.      Pharynx: Oropharynx is clear. Eyes:      Extraocular Movements: Extraocular movements intact. Neck:      Thyroid: No thyromegaly. Cardiovascular:      Rate and Rhythm: Normal rate and regular rhythm. Pulses: Normal pulses. Heart sounds: Normal heart sounds. No murmur heard. No friction rub. No gallop. Pulmonary:      Effort: Pulmonary effort is normal. No respiratory distress. Breath sounds: Normal breath sounds. No stridor. No wheezing, rhonchi or rales. Chest:      Chest wall: No tenderness. Breasts:     Right: Normal. No swelling, bleeding, inverted nipple, mass, nipple discharge, skin change or tenderness. Left: Normal. No swelling, bleeding, inverted nipple, mass, nipple discharge, skin change or tenderness. Abdominal:      General: Bowel sounds are normal. There is no distension. Palpations: Abdomen is soft. There is no mass. Tenderness: There is no abdominal tenderness. There is no guarding or rebound. Hernia: No hernia is present. There is no hernia in the left inguinal area or right inguinal area. Genitourinary:     General: Normal vulva. Exam position: Lithotomy position. Pubic Area: No rash. Labia:         Right: No rash, tenderness, lesion or injury. Left: No rash, tenderness, lesion or injury. Urethra: No prolapse, urethral pain, urethral swelling or urethral lesion. Vagina: No signs of injury and foreign body. No vaginal discharge, erythema, tenderness, bleeding, lesions or prolapsed vaginal walls. Cervix: No cervical motion tenderness, discharge, friability, lesion, erythema, cervical bleeding or eversion.       Uterus: Not deviated, not enlarged, not fixed, not tender and no uterine prolapse. Adnexa:         Right: No mass, tenderness or fullness. Left: No mass, tenderness or fullness. Rectum: No anal fissure or external hemorrhoid. Comments: Normal urethral meatus, normal urethra, nl bladder  Musculoskeletal:         General: No swelling, tenderness, deformity or signs of injury. Normal range of motion. Cervical back: Normal range of motion and neck supple. No rigidity. Lymphadenopathy:      Cervical: No cervical adenopathy. Lower Body: No right inguinal adenopathy. No left inguinal adenopathy. Skin:     General: Skin is warm and dry. Coloration: Skin is not jaundiced or pale. Findings: No bruising, erythema, lesion or rash. Neurological:      General: No focal deficit present. Mental Status: She is alert and oriented to person, place, and time. Mental status is at baseline. Deep Tendon Reflexes: Reflexes are normal and symmetric. Psychiatric:         Mood and Affect: Mood normal.         Behavior: Behavior normal.         Thought Content:  Thought content normal.         Judgment: Judgment normal.       Assessment:      Annual  Birth control      Plan:      Pap, calcium, exercise  Refill for one year Gable Closs, MD

## 2023-04-10 ENCOUNTER — HOSPITAL ENCOUNTER (EMERGENCY)
Age: 33
Discharge: HOME | End: 2023-04-11
Attending: EMERGENCY MEDICINE
Payer: COMMERCIAL

## 2023-04-10 DIAGNOSIS — R51.9 ACUTE NONINTRACTABLE HEADACHE, UNSPECIFIED HEADACHE TYPE: Primary | ICD-10-CM

## 2023-04-10 LAB
ANION GAP SERPL CALCULATED.3IONS-SCNC: 7 MMOL/L (ref 3–16)
BASOPHILS # BLD: 0 K/UL (ref 0–0.2)
BASOPHILS NFR BLD: 0 %
BUN SERPL-MCNC: 7 MG/DL (ref 7–20)
CALCIUM SERPL-MCNC: 9.4 MG/DL (ref 8.3–10.6)
CHLORIDE SERPL-SCNC: 106 MMOL/L (ref 99–110)
CO2 SERPL-SCNC: 28 MMOL/L (ref 21–32)
CREAT SERPL-MCNC: 0.6 MG/DL (ref 0.6–1.1)
DEPRECATED RDW RBC AUTO: 15.1 % (ref 12.4–15.4)
EOSINOPHIL # BLD: 0 K/UL (ref 0–0.6)
EOSINOPHIL NFR BLD: 0 %
GFR SERPLBLD CREATININE-BSD FMLA CKD-EPI: >60 ML/MIN/{1.73_M2}
GLUCOSE SERPL-MCNC: 102 MG/DL (ref 70–99)
HCG SERPL QL: NEGATIVE
HCT VFR BLD AUTO: 42.1 % (ref 36–48)
HGB BLD-MCNC: 14.1 G/DL (ref 12–16)
LYMPHOCYTES # BLD: 6 K/UL (ref 1–5.1)
LYMPHOCYTES NFR BLD: 43 %
MCH RBC QN AUTO: 29.1 PG (ref 26–34)
MCHC RBC AUTO-ENTMCNC: 33.6 G/DL (ref 31–36)
MCV RBC AUTO: 86.5 FL (ref 80–100)
MONOCYTES # BLD: 0.8 K/UL (ref 0–1.3)
MONOCYTES NFR BLD: 6 %
NEUTROPHILS # BLD: 6 K/UL (ref 1.7–7.7)
NEUTROPHILS NFR BLD: 47 %
PATH INTERP BLD-IMP: YES
PLATELET # BLD AUTO: 326 K/UL (ref 135–450)
PLATELET BLD QL SMEAR: ADEQUATE
PMV BLD AUTO: 7.9 FL (ref 5–10.5)
POTASSIUM SERPL-SCNC: 4.3 MMOL/L (ref 3.5–5.1)
RBC # BLD AUTO: 4.87 M/UL (ref 4–5.2)
RBC MORPH BLD: NORMAL
SMUDGE CELLS BLD QL SMEAR: PRESENT
SODIUM SERPL-SCNC: 141 MMOL/L (ref 136–145)
VARIANT LYMPHS NFR BLD MANUAL: 4 % (ref 0–6)
WBC # BLD AUTO: 12.8 K/UL (ref 4–11)

## 2023-04-10 PROCEDURE — 96375 TX/PRO/DX INJ NEW DRUG ADDON: CPT

## 2023-04-10 PROCEDURE — 84703 CHORIONIC GONADOTROPIN ASSAY: CPT

## 2023-04-10 PROCEDURE — 2580000003 HC RX 258: Performed by: EMERGENCY MEDICINE

## 2023-04-10 PROCEDURE — 99284 EMERGENCY DEPT VISIT MOD MDM: CPT

## 2023-04-10 PROCEDURE — 80048 BASIC METABOLIC PNL TOTAL CA: CPT

## 2023-04-10 PROCEDURE — 6360000002 HC RX W HCPCS: Performed by: EMERGENCY MEDICINE

## 2023-04-10 PROCEDURE — 96372 THER/PROPH/DIAG INJ SC/IM: CPT

## 2023-04-10 PROCEDURE — 96374 THER/PROPH/DIAG INJ IV PUSH: CPT

## 2023-04-10 PROCEDURE — 85025 COMPLETE CBC W/AUTO DIFF WBC: CPT

## 2023-04-10 PROCEDURE — 6370000000 HC RX 637 (ALT 250 FOR IP): Performed by: EMERGENCY MEDICINE

## 2023-04-10 RX ORDER — KETOROLAC TROMETHAMINE 30 MG/ML
30 INJECTION, SOLUTION INTRAMUSCULAR; INTRAVENOUS ONCE
Status: COMPLETED | OUTPATIENT
Start: 2023-04-10 | End: 2023-04-10

## 2023-04-10 RX ORDER — ONDANSETRON 2 MG/ML
4 INJECTION INTRAMUSCULAR; INTRAVENOUS ONCE
Status: COMPLETED | OUTPATIENT
Start: 2023-04-10 | End: 2023-04-10

## 2023-04-10 RX ORDER — 0.9 % SODIUM CHLORIDE 0.9 %
1000 INTRAVENOUS SOLUTION INTRAVENOUS ONCE
Status: COMPLETED | OUTPATIENT
Start: 2023-04-10 | End: 2023-04-11

## 2023-04-10 RX ORDER — PROCHLORPERAZINE MALEATE 10 MG
10 TABLET ORAL ONCE
Status: COMPLETED | OUTPATIENT
Start: 2023-04-10 | End: 2023-04-10

## 2023-04-10 RX ADMIN — PROCHLORPERAZINE MALEATE 10 MG: 10 TABLET ORAL at 22:38

## 2023-04-10 RX ADMIN — SODIUM CHLORIDE 1000 ML: 0.9 INJECTION, SOLUTION INTRAVENOUS at 22:40

## 2023-04-10 RX ADMIN — KETOROLAC TROMETHAMINE 30 MG: 30 INJECTION, SOLUTION INTRAMUSCULAR at 22:40

## 2023-04-10 RX ADMIN — ONDANSETRON 4 MG: 2 INJECTION INTRAMUSCULAR; INTRAVENOUS at 22:40

## 2023-04-10 ASSESSMENT — PAIN SCALES - GENERAL
PAINLEVEL_OUTOF10: 8
PAINLEVEL_OUTOF10: 8

## 2023-04-10 ASSESSMENT — PAIN DESCRIPTION - DESCRIPTORS
DESCRIPTORS: ACHING
DESCRIPTORS: ACHING

## 2023-04-10 ASSESSMENT — PAIN - FUNCTIONAL ASSESSMENT: PAIN_FUNCTIONAL_ASSESSMENT: 0-10

## 2023-04-10 ASSESSMENT — PAIN DESCRIPTION - LOCATION
LOCATION: HEAD
LOCATION: HEAD

## 2023-04-11 ENCOUNTER — APPOINTMENT (OUTPATIENT)
Dept: CT IMAGING | Age: 33
End: 2023-04-11
Payer: COMMERCIAL

## 2023-04-11 VITALS
TEMPERATURE: 98.4 F | SYSTOLIC BLOOD PRESSURE: 138 MMHG | DIASTOLIC BLOOD PRESSURE: 91 MMHG | HEART RATE: 86 BPM | OXYGEN SATURATION: 100 % | BODY MASS INDEX: 47.33 KG/M2 | RESPIRATION RATE: 16 BRPM | WEIGHT: 293 LBS

## 2023-04-11 LAB
BILIRUB UR QL STRIP.AUTO: NEGATIVE
CLARITY UR: CLEAR
COLOR UR: YELLOW
GLUCOSE UR STRIP.AUTO-MCNC: NEGATIVE MG/DL
HGB UR QL STRIP.AUTO: NEGATIVE
KETONES UR STRIP.AUTO-MCNC: NEGATIVE MG/DL
LEUKOCYTE ESTERASE UR QL STRIP.AUTO: NEGATIVE
NITRITE UR QL STRIP.AUTO: NEGATIVE
PH UR STRIP.AUTO: 6.5 [PH] (ref 5–8)
PROT UR STRIP.AUTO-MCNC: NEGATIVE MG/DL
SP GR UR STRIP.AUTO: 1.01 (ref 1–1.03)
UA DIPSTICK W REFLEX MICRO PNL UR: NORMAL
URN SPEC COLLECT METH UR: NORMAL
UROBILINOGEN UR STRIP-ACNC: 1 E.U./DL

## 2023-04-11 PROCEDURE — 70450 CT HEAD/BRAIN W/O DYE: CPT

## 2023-04-11 PROCEDURE — 6370000000 HC RX 637 (ALT 250 FOR IP): Performed by: EMERGENCY MEDICINE

## 2023-04-11 PROCEDURE — 6360000002 HC RX W HCPCS: Performed by: EMERGENCY MEDICINE

## 2023-04-11 PROCEDURE — 81003 URINALYSIS AUTO W/O SCOPE: CPT

## 2023-04-11 RX ORDER — PROCHLORPERAZINE MALEATE 10 MG
10 TABLET ORAL EVERY 8 HOURS PRN
Qty: 12 TABLET | Refills: 0 | Status: SHIPPED | OUTPATIENT
Start: 2023-04-11 | End: 2023-04-14 | Stop reason: SDUPTHER

## 2023-04-11 RX ORDER — SUMATRIPTAN 6 MG/.5ML
6 INJECTION, SOLUTION SUBCUTANEOUS ONCE
Status: COMPLETED | OUTPATIENT
Start: 2023-04-11 | End: 2023-04-11

## 2023-04-11 RX ORDER — BUTALBITAL, ACETAMINOPHEN AND CAFFEINE 50; 325; 40 MG/1; MG/1; MG/1
1 TABLET ORAL EVERY 6 HOURS PRN
Qty: 16 TABLET | Refills: 0 | Status: SHIPPED | OUTPATIENT
Start: 2023-04-11 | End: 2023-04-15

## 2023-04-11 RX ORDER — DEXAMETHASONE SODIUM PHOSPHATE 4 MG/ML
10 INJECTION, SOLUTION INTRA-ARTICULAR; INTRALESIONAL; INTRAMUSCULAR; INTRAVENOUS; SOFT TISSUE ONCE
Status: COMPLETED | OUTPATIENT
Start: 2023-04-11 | End: 2023-04-11

## 2023-04-11 RX ORDER — BUTALBITAL, ACETAMINOPHEN AND CAFFEINE 50; 325; 40 MG/1; MG/1; MG/1
2 TABLET ORAL ONCE
Status: COMPLETED | OUTPATIENT
Start: 2023-04-11 | End: 2023-04-11

## 2023-04-11 RX ADMIN — BUTALBITAL, ACETAMINOPHEN, AND CAFFEINE 2 TABLET: 50; 325; 40 TABLET ORAL at 01:08

## 2023-04-11 RX ADMIN — DEXAMETHASONE SODIUM PHOSPHATE 10 MG: 4 INJECTION, SOLUTION INTRAMUSCULAR; INTRAVENOUS at 00:15

## 2023-04-11 RX ADMIN — SUMATRIPTAN 6 MG: 6 INJECTION, SOLUTION SUBCUTANEOUS at 00:37

## 2023-04-11 ASSESSMENT — PAIN DESCRIPTION - LOCATION
LOCATION: HEAD
LOCATION: HEAD

## 2023-04-11 ASSESSMENT — PAIN SCALES - GENERAL
PAINLEVEL_OUTOF10: 7
PAINLEVEL_OUTOF10: 10

## 2023-04-11 ASSESSMENT — PAIN DESCRIPTION - DESCRIPTORS: DESCRIPTORS: POUNDING;THROBBING

## 2023-04-11 NOTE — ED PROVIDER NOTES
Emergency Department Provider Note  Location: [address] [place] [address]  4/10/2023     Patient Identification  Marcelo Apodaca is a 26 y.o. female    Chief Complaint  Headache (Pt states she has a history of Vertigo and is having similar symptoms now with a HA; Recently came back from a cruise this past week and has been having issues since. BP is not normally elevated but during triage: 152/111 and 158/104. No other illness symptoms. No vision changes. No N/V/D)      Mode of Arrival  private car    HPI  (History provided by patient)  This is a 26 y.o. female with a PMH significant for vertigo presented today for headache. Patient said her headache started last week after she got back from a cruise. She denies getting motion sickness while on the cruise. She describe gradual onset of headache that is pressure/tight sensation. She has photophobia and nausea associated with the headache. No vomiting. She has dizziness associated with this but denies room spinning sensation. No fever. No stiff neck or URI symptoms. She tried [employer] powder, ibuprofen, Tylenol with minimal relief. She states the headache has been pretty persistent for a full week and she finally \"had enough\" which prompted her to come to the ED today. Patient states she has had headache in the past and she would take [employer] powder for it. Her mom has a history of migraine but she does not. She states at the onset of her headache, she had some flashes of light in her vision but that has resolved. No other complaints, modifying factors or associated symptoms.     ROS  No fever no URI symptoms  No stiff neck  No chest pain or shortness of breath  No abdominal pain or diarrhea    I have reviewed the following nursing documentation:  Allergies: No Known Allergies    Past medical history:  has a past medical history of Asthma, Generalized anxiety disorder, Moderate episode of recurrent major depressive disorder ([facility] [address] (07/19/2018), Morbid

## 2023-04-12 LAB — PATH INTERP BLD-IMP: NORMAL

## 2023-04-14 PROBLEM — F33.1 MODERATE EPISODE OF RECURRENT MAJOR DEPRESSIVE DISORDER (HCC): Status: RESOLVED | Noted: 2018-07-19 | Resolved: 2023-04-14

## 2023-05-10 DIAGNOSIS — G43.909 ACUTE MIGRAINE: ICD-10-CM

## 2023-05-10 RX ORDER — ATENOLOL 25 MG/1
TABLET ORAL
Qty: 30 TABLET | Refills: 3 | Status: SHIPPED | OUTPATIENT
Start: 2023-05-10

## 2023-05-10 NOTE — TELEPHONE ENCOUNTER
Medication:   Requested Prescriptions     Pending Prescriptions Disp Refills    atenolol (TENORMIN) 25 MG tablet [Pharmacy Med Name: ATENOLOL 25 MG TABLET] 30 tablet 3     Sig: TAKE 1 TABLET BY MOUTH EVERY DAY        Last Filled:      Patient Phone Number: [phone] (home) [phone] (work)    Last appt: 4/14/2023   Next appt: Visit date not found    Last OARRS: No flowsheet data found.

## 2023-05-15 ENCOUNTER — TELEPHONE (OUTPATIENT)
Dept: BARIATRICS/WEIGHT MGMT | Age: 33
End: 2023-05-15

## 2023-05-15 NOTE — TELEPHONE ENCOUNTER
Pt came to office in 2022 for a possible revision, was sent to dr Lazara Velarde for follow up, pt is not back on your schedule to follow up to that possible revision,     Do you still want to see this pt for revision?   Please advise

## 2023-07-12 ENCOUNTER — OFFICE VISIT (OUTPATIENT)
Dept: PRIMARY CARE CLINIC | Age: 33
End: 2023-07-12
Payer: COMMERCIAL

## 2023-07-12 VITALS
SYSTOLIC BLOOD PRESSURE: 120 MMHG | HEART RATE: 86 BPM | HEIGHT: 69 IN | BODY MASS INDEX: 43.4 KG/M2 | WEIGHT: 293 LBS | DIASTOLIC BLOOD PRESSURE: 75 MMHG | TEMPERATURE: 97.3 F

## 2023-07-12 DIAGNOSIS — Z23 NEED FOR DIPHTHERIA-TETANUS-PERTUSSIS (TDAP) VACCINE: ICD-10-CM

## 2023-07-12 DIAGNOSIS — R51.9 ACUTE NONINTRACTABLE HEADACHE, UNSPECIFIED HEADACHE TYPE: ICD-10-CM

## 2023-07-12 DIAGNOSIS — Z01.818 PREOP EXAM FOR INTERNAL MEDICINE: ICD-10-CM

## 2023-07-12 PROCEDURE — 99214 OFFICE O/P EST MOD 30 MIN: CPT | Performed by: INTERNAL MEDICINE

## 2023-07-12 RX ORDER — BUTALBITAL, ACETAMINOPHEN AND CAFFEINE 50; 325; 40 MG/1; MG/1; MG/1
1 TABLET ORAL EVERY 6 HOURS PRN
Qty: 30 TABLET | Refills: 1 | Status: SHIPPED | OUTPATIENT
Start: 2023-07-12

## 2023-07-12 RX ORDER — ATENOLOL 25 MG/1
25 TABLET ORAL DAILY
Qty: 90 TABLET | Refills: 4 | Status: SHIPPED | OUTPATIENT
Start: 2023-07-12

## 2023-07-12 ASSESSMENT — ENCOUNTER SYMPTOMS
SHORTNESS OF BREATH: 0
CONSTIPATION: 0
EYES NEGATIVE: 1
CHEST TIGHTNESS: 0
BLOOD IN STOOL: 0
COUGH: 0
DIARRHEA: 0
ABDOMINAL PAIN: 0
GASTROINTESTINAL NEGATIVE: 1
WHEEZING: 0
ABDOMINAL DISTENTION: 0

## 2023-07-12 NOTE — PROGRESS NOTES
Subjective:      Patient ID: Howie Iverson is a 26 y.o. female. 7/12/2023 Patient presents with:  Pre-op Exam: Per pt will be having the Davide procedure with Dr. Lee Verma    Has been going to [facility]     Has had Surgery before / gastric sleeve ; no personal or FH of anaesthesia problems or bleeding disorders                 Last seen   VV 4/14/2023 Patient presents with: Follow-up: Aden Robin to [id number] 4/10/2023 for a migraine, still having a migraine    Started 1 wk ago . Was on a cruise before that !           9/8/2021 Patient presents with:  Pre-op Exam: gastric sleeve, surgery 9/14/21, Dr Kristi Salmeron, surgery at [facility]    [facility] or [facility]     Has never had Anaesthesia . No bleeding issues               7/30/2020   Dr Dez Burden Patient presents with:  Established New Doctor    Ch anxiety . Getting worse . Was given Zoloft but never tried it     Wants to try Vistaril     Nurse by profession            Review of Systems   Constitutional:  Negative for activity change, appetite change, fatigue, fever and unexpected weight change. Flu vac     Tdap     Pneum Vac 7/18    covid vac   HENT: Negative. Dental care reg    Eyes: Negative. Negative for visual disturbance. Wears glasses     Last exam 2022   Respiratory:  Negative for cough, chest tightness, shortness of breath and wheezing. Does not smoke     No Etoh     asthma    Cardiovascular: Negative. Negative for chest pain. No HTN / CAD     FH of HTN but no CAD    Gastrointestinal: Negative. Negative for abdominal distention, abdominal pain, blood in stool, constipation and diarrhea. No FH of ca colon     S/P Gastric Sleeve  2021 ; lost 70 lbs foll that   Endocrine:        FH of Diabetes    Genitourinary:  Negative for dysuria, menstrual problem, urgency and vaginal discharge. Periods reg     LMP   7/6/23    One child 6 .   Nl gestation     On nuvaring     Last pelvic

## 2023-07-26 ENCOUNTER — TELEPHONE (OUTPATIENT)
Dept: PRIMARY CARE CLINIC | Age: 33
End: 2023-07-26

## 2023-07-26 NOTE — TELEPHONE ENCOUNTER
----- Message from Nahum Herman, [address] sent at 7/26/2023  4:50 PM EDT -----  Subject: Message to Provider    QUESTIONS  Information for Provider? Patient needs to speak with the clinical staff   in regards to her pre-op paperwork for weight loss surgery. She needs this   paperwork ASAP.   ---------------------------------------------------------------------------  --------------  Briana Foster [facility]  [phone]; OK to leave message on voicemail  ---------------------------------------------------------------------------  --------------  SCRIPT ANSWERS  Relationship to Patient?  Self

## 2023-08-01 ENCOUNTER — TELEPHONE (OUTPATIENT)
Dept: PRIMARY CARE CLINIC | Age: 33
End: 2023-08-01

## 2023-08-01 NOTE — TELEPHONE ENCOUNTER
Patient would like to know asap if she has been cleared for surgery at [address] left message on 7/26/23, patient stated that she has done all test required at [address]; please call patient at [phone] in order to schedule surgery date.

## 2023-08-01 NOTE — TELEPHONE ENCOUNTER
----- Message from Annette Sheets sent at 7/31/2023  3:30 PM EDT -----  Subject: Message to Provider    QUESTIONS  Information for Provider? Please call patient regarding previous message. She has question regarding Pre -op paper work to be filled out. Thank you   :)   ---------------------------------------------------------------------------  --------------  Charbel CAVAZOS  [phone]; OK to leave message on voicemail  ---------------------------------------------------------------------------  --------------  SCRIPT ANSWERS  Relationship to Patient?  Self

## 2023-08-01 NOTE — TELEPHONE ENCOUNTER
Printed Pre op note clearance. Called pt. She asked that we send it to [facility]; Vamsi Doty  fax:  [phone].

## 2023-09-07 ENCOUNTER — TELEPHONE (OUTPATIENT)
Dept: PRIMARY CARE CLINIC | Age: 33
End: 2023-09-07

## 2023-09-07 DIAGNOSIS — R51.9 ACUTE NONINTRACTABLE HEADACHE, UNSPECIFIED HEADACHE TYPE: ICD-10-CM

## 2023-09-07 RX ORDER — ATENOLOL 50 MG/1
50 TABLET ORAL DAILY
Qty: 90 TABLET | Refills: 1 | Status: SHIPPED | OUTPATIENT
Start: 2023-09-07

## 2023-09-07 RX ORDER — ACETAMINOPHEN 500 MG
500 TABLET ORAL EVERY 6 HOURS PRN
Qty: 90 TABLET | Refills: 0 | Status: SHIPPED | OUTPATIENT
Start: 2023-09-07

## 2023-09-07 NOTE — TELEPHONE ENCOUNTER
Patient having headache , no fever ear aches as well she would like something called in for this please advise.  I did advise patient to test for covid and she said she was going to see if she had one at home

## 2023-09-08 ENCOUNTER — TELEMEDICINE (OUTPATIENT)
Dept: PRIMARY CARE CLINIC | Age: 33
End: 2023-09-08
Payer: COMMERCIAL

## 2023-09-08 DIAGNOSIS — B96.89 BACTERIAL SINUSITIS: Primary | ICD-10-CM

## 2023-09-08 DIAGNOSIS — J32.9 BACTERIAL SINUSITIS: Primary | ICD-10-CM

## 2023-09-08 PROCEDURE — 99213 OFFICE O/P EST LOW 20 MIN: CPT | Performed by: FAMILY MEDICINE

## 2023-09-08 RX ORDER — FLUTICASONE PROPIONATE 50 MCG
2 SPRAY, SUSPENSION (ML) NASAL DAILY
Qty: 16 G | Refills: 0 | Status: SHIPPED | OUTPATIENT
Start: 2023-09-08

## 2023-09-08 RX ORDER — AMOXICILLIN AND CLAVULANATE POTASSIUM 875; 125 MG/1; MG/1
1 TABLET, FILM COATED ORAL 2 TIMES DAILY
Qty: 20 TABLET | Refills: 0 | Status: SHIPPED | OUTPATIENT
Start: 2023-09-08 | End: 2023-09-18

## 2023-09-08 ASSESSMENT — ENCOUNTER SYMPTOMS
SINUS PAIN: 1
SORE THROAT: 0
SINUS PRESSURE: 1
TROUBLE SWALLOWING: 0
RHINORRHEA: 1
VOICE CHANGE: 1

## 2023-09-08 NOTE — PROGRESS NOTES
Angel Frank, was evaluated through a synchronous (real-time) audio-video encounter. The patient (or guardian if applicable) is aware that this is a billable service, which includes applicable co-pays. This Virtual Visit was conducted with patient's (and/or legal guardian's) consent. Patient identification was verified, and a caregiver was present when appropriate. The patient was located at Home: [address] [place] [address]  Provider was located at [facility] (Appt Dept): [address],  [address] [facility]      Angel Frakn (:  1990) is a Established patient, presenting virtually for evaluation of the followin [address]   Below is the assessment and plan developed based on review of pertinent history, physical exam, labs, studies, and medications. 1. Bacterial sinusitis  -     amoxicillin-clavulanate (AUGMENTIN) 875-125 MG per tablet; Take 1 tablet by mouth 2 times daily for 10 days, Disp-20 tablet, R-0Normal  -     fluticasone (FLONASE) 50 MCG/ACT nasal spray; 2 sprays by Each Nostril route daily, Disp-16 g, R-0Normal    Return if symptoms worsen or fail to improve. Subjective   9.8.23 - virtual visit  Pt of Dr. Dwight Cannon. Pt with nasal congestion, rhinorrhea, ear fullness, muffled hearing, and both maxillary and frontal sinus congestion, along with chills and fatigue. Recently had ear infection for which she admits she did not complete the full abx prescription. States she was feeling better from the congestion which started 4-5 days ago, but then as of yesterday she started feeling much worse and called off work for today -- she works at [employer]. Denies sore throat, negative COVID swab at home x 2.   -- will rx augmentin x 10 days + flonase  -- needs off work note for today and tomorrow, may return in 2 days after 24 hr of antibiotic coverage    Review of Systems   Constitutional:  Positive for chills and fatigue. Negative for fever.    HENT:  Positive for

## 2023-09-11 ENCOUNTER — TELEPHONE (OUTPATIENT)
Dept: PRIMARY CARE CLINIC | Age: 33
End: 2023-09-11

## 2023-10-27 ENCOUNTER — OFFICE VISIT (OUTPATIENT)
Dept: PRIMARY CARE CLINIC | Age: 33
End: 2023-10-27
Payer: COMMERCIAL

## 2023-10-27 VITALS
BODY MASS INDEX: 50.95 KG/M2 | DIASTOLIC BLOOD PRESSURE: 71 MMHG | WEIGHT: 293 LBS | TEMPERATURE: 97.3 F | RESPIRATION RATE: 18 BRPM | SYSTOLIC BLOOD PRESSURE: 107 MMHG | HEART RATE: 93 BPM

## 2023-10-27 DIAGNOSIS — H92.01 RIGHT EAR PAIN: Primary | ICD-10-CM

## 2023-10-27 DIAGNOSIS — J31.0 CHRONIC RHINITIS: ICD-10-CM

## 2023-10-27 PROCEDURE — 99213 OFFICE O/P EST LOW 20 MIN: CPT | Performed by: FAMILY MEDICINE

## 2023-10-27 RX ORDER — ERGOCALCIFEROL 1.25 MG/1
50000 CAPSULE ORAL WEEKLY
Qty: 12 CAPSULE | Refills: 1 | Status: CANCELLED | OUTPATIENT
Start: 2023-10-27

## 2023-10-27 RX ORDER — AMOXICILLIN 500 MG/1
500 CAPSULE ORAL 2 TIMES DAILY
Qty: 20 CAPSULE | Refills: 0 | Status: SHIPPED | OUTPATIENT
Start: 2023-10-27 | End: 2023-11-06

## 2023-10-27 RX ORDER — FLUTICASONE PROPIONATE 50 MCG
2 SPRAY, SUSPENSION (ML) NASAL DAILY
Qty: 16 G | Refills: 0 | Status: SHIPPED | OUTPATIENT
Start: 2023-10-27

## 2023-10-27 ASSESSMENT — ENCOUNTER SYMPTOMS
ABDOMINAL PAIN: 0
BLOOD IN STOOL: 0
DIARRHEA: 0
CONSTIPATION: 0
SHORTNESS OF BREATH: 0

## 2023-10-27 NOTE — PATIENT INSTRUCTIONS
GENERAL OFFICE POLICIES        Telephone Calls: Messages will be answered within 1-2 business days, unless the provider is out of the office. If it is urgent a covering provider will answer. (this does not include Medication refills). MyChart: We recommend all patients sign up for [employer]. Through this portal you can see your lab results, request refills, schedule appointments, pay your bill and send messages to the office. [employer] messages will be answered within 1-2 business days unless the provider is out of the office. For urgent matters, please call the office. Appointments:  All appointments must be scheduled. We ask all patients to schedule their next follow up appointment before they leave the office to make sure you will be able to be seen before you run out of medications. 24 hours' notice is required to cancel or reschedule an appointment to avoid being marked as a no show. You may be dismissed from the practice after 3 no shows. LATE for Appointment: If you are 15 or more minutes late for your appointment, you may be asked to reschedule. MA/LAB APPTS: Must be scheduled, cannot accept walk in lab visits. We only draw labs for patients established in our office. We only do injections for medications ordered by our office. Acute Sick Visits:  Nothing other than acute complaint will be addressed at this visit. TRADITIONAL MEDICARE DOES NOT COVER PHYSICALS  MEDICARE WELLNESS VISITS: These are NOT physicals, but the free annual visit offered by Medicare to discuss wellness issues. Medication refills, checkups, etc. will not be addressed during this visit. Medication Refills: Refills are handled electronically; please contact your pharmacy for refills even if current refills have been exhausted. If you are on a controlled medication, you will be referred to a specialist (pain specialist, psychiatry, etc). Forms:  There is a $35 fee to fill out FMLA/Disability paperwork,

## 2023-10-27 NOTE — PROGRESS NOTES
10/27/2023     Coni Anthony (:  1990) is a 26 y.o. female, here for evaluation of the following medical concerns:    Otalgia   Pertinent negatives include no abdominal pain or diarrhea. Patient presented  to the office for checkup. She complain of intermittent right ear pain with fullness for the past 6 months. It was worst 2 weeks ago  when she went to [facility] in [place] for vacation. Has ear fullness on both ear worse on the right ear. She denies headache nausea vomiting denies fever and chills denies chest pain or shortness of breath. Review of Systems   Constitutional:  Negative for activity change and appetite change. HENT:  Positive for congestion and ear pain. Eyes:  Negative for visual disturbance. Respiratory:  Negative for shortness of breath. Cardiovascular:  Negative for chest pain and leg swelling. Gastrointestinal:  Negative for abdominal pain, blood in stool, constipation and diarrhea. Genitourinary:  Negative for difficulty urinating, frequency, hematuria, menstrual problem and urgency. Neurological:  Negative for dizziness and syncope. Psychiatric/Behavioral:  Negative for behavioral problems. Prior to Visit Medications    Medication Sig Taking?  Authorizing Provider   amoxicillin (AMOXIL) 500 MG capsule Take 1 capsule by mouth 2 times daily for 10 days Yes Ron Verma MD   fluticasone (FLONASE) 50 MCG/ACT nasal spray 2 sprays by Each Nostril route daily Yes Ron Verma MD   fluticasone (FLONASE) 50 MCG/ACT nasal spray 2 sprays by Each Nostril route daily Yes Karin Silvestre,    atenolol (TENORMIN) 50 MG tablet Take 1 tablet by mouth daily Yes Brooklyn Tesfaye MD   acetaminophen (TYLENOL) 500 MG tablet Take 1 tablet by mouth every 6 hours as needed for Pain Yes Brooklyn Tesfaye MD   butalbital-acetaminophen-caffeine (FIORICET, ESGIC) -40 MG per tablet Take 1 tablet by mouth every 6 hours as needed for Headaches Yes Peng,

## 2023-11-07 ENCOUNTER — OFFICE VISIT (OUTPATIENT)
Dept: ENT CLINIC | Age: 33
End: 2023-11-07
Payer: COMMERCIAL

## 2023-11-07 VITALS
WEIGHT: 293 LBS | HEIGHT: 69 IN | DIASTOLIC BLOOD PRESSURE: 93 MMHG | BODY MASS INDEX: 43.4 KG/M2 | SYSTOLIC BLOOD PRESSURE: 146 MMHG | HEART RATE: 114 BPM

## 2023-11-07 DIAGNOSIS — H69.93 DYSFUNCTION OF BOTH EUSTACHIAN TUBES: ICD-10-CM

## 2023-11-07 DIAGNOSIS — H92.01 RIGHT EAR PAIN: Primary | ICD-10-CM

## 2023-11-07 DIAGNOSIS — H91.93 BILATERAL HEARING LOSS, UNSPECIFIED HEARING LOSS TYPE: ICD-10-CM

## 2023-11-07 PROCEDURE — 99204 OFFICE O/P NEW MOD 45 MIN: CPT | Performed by: STUDENT IN AN ORGANIZED HEALTH CARE EDUCATION/TRAINING PROGRAM

## 2023-11-07 NOTE — PROGRESS NOTES
[address] [facility] (:  1990) is a 26 y.o. female, here for evaluation of the following chief complaint(s):  Ear Problem      ASSESSMENT/PLAN:  1. Right ear pain  2. Dysfunction of both eustachian tubes  3. Bilateral hearing loss, unspecified hearing loss type      This is a very pleasant 26 y.o. female here today for evaluation of the the above-noted complaints.     -Increase fluticasone to 1 spray twice daily  -Short course of Afrin and Sudafed  -Follow-up in 6 weeks with an audiogram beforehand. Discussed that we could consider placement of tympanostomy tube depending on her audiogram results. The risks, benefits and alternatives to intranasal steroid use were discussed with the patient in detail, including the risks of burning, dryness, crusting, and occasional nosebleeds. Additional rare risks include septal perforations, mucosal atrophy, contact dermatitis as well as the potential risk of glaucoma or cataracts with sustained and prolonged use. The patient expressed understanding of the risks and wished to proceed with therapy. Medical Decision Making: The following items were considered in medical decision making:  Independent review of images  Review / order clinical lab tests  Review / order radiology tests  Decision to obtain old records  Review and summation of old records as accessed through [facility] if applicable    SUBJECTIVE/OBJECTIVE:  HPI    Rosangela Matthews is here today for evaluation of ear issues. Patient states her symptoms have been ongoing for the last 6 weeks. They include ear pain and pressure, decreased hearing, crackling in her ear. Denies tinnitus, prior history of ear surgery, vertigo or disequilibrium. She feels like she has some associated hearing loss. No history of ear tubes as a child.   No recent hearing test.  Was put on medications by her primary care provider and referred here for

## 2023-12-13 DIAGNOSIS — H91.90 HEARING LOSS, UNSPECIFIED HEARING LOSS TYPE, UNSPECIFIED LATERALITY: Primary | ICD-10-CM

## 2024-01-04 ENCOUNTER — OFFICE VISIT (OUTPATIENT)
Dept: GYNECOLOGY | Age: 34
End: 2024-01-04
Payer: COMMERCIAL

## 2024-01-04 VITALS
DIASTOLIC BLOOD PRESSURE: 64 MMHG | HEIGHT: 69 IN | WEIGHT: 293 LBS | BODY MASS INDEX: 43.4 KG/M2 | RESPIRATION RATE: 17 BRPM | HEART RATE: 72 BPM | SYSTOLIC BLOOD PRESSURE: 118 MMHG

## 2024-01-04 DIAGNOSIS — R35.0 URINARY FREQUENCY: Primary | ICD-10-CM

## 2024-01-04 DIAGNOSIS — N89.8 VAGINAL DISCHARGE: ICD-10-CM

## 2024-01-04 DIAGNOSIS — R33.9 INCOMPLETE BLADDER EMPTYING: ICD-10-CM

## 2024-01-04 PROCEDURE — 99213 OFFICE O/P EST LOW 20 MIN: CPT | Performed by: OBSTETRICS & GYNECOLOGY

## 2024-01-05 LAB
BACTERIA GENITAL QL WET PREP: NORMAL
CLUE CELLS SPEC QL WET PREP: NORMAL
EPI CELLS SPEC QL WET PREP: NORMAL
RBC SPEC QL WET PREP: NORMAL
SPECIMEN SOURCE FLD: NORMAL
T VAGINALIS GENITAL QL WET PREP: NORMAL
WBC SPEC QL WET PREP: NORMAL
YEAST GENITAL QL WET PREP: NORMAL

## 2024-01-06 LAB — BACTERIA UR CULT: NORMAL

## 2024-01-07 LAB — BACTERIA GENITAL AEROBE CULT: NORMAL

## 2024-01-07 ASSESSMENT — ENCOUNTER SYMPTOMS
ALLERGIC/IMMUNOLOGIC NEGATIVE: 1
GASTROINTESTINAL NEGATIVE: 1
RESPIRATORY NEGATIVE: 1
EYES NEGATIVE: 1

## 2024-01-08 LAB
BACTERIA GENITAL AEROBE CULT: ABNORMAL
BACTERIA GENITAL AEROBE CULT: ABNORMAL
ORGANISM: ABNORMAL

## 2024-01-09 LAB
C TRACH DNA CVX QL NAA+PROBE: NEGATIVE
N GONORRHOEA DNA CERV MUCUS QL NAA+PROBE: NEGATIVE

## 2024-01-10 DIAGNOSIS — N76.0 BV (BACTERIAL VAGINOSIS): Primary | ICD-10-CM

## 2024-01-10 DIAGNOSIS — B96.89 BV (BACTERIAL VAGINOSIS): Primary | ICD-10-CM

## 2024-01-10 RX ORDER — CLINDAMYCIN PHOSPHATE 20 MG/G
1 CREAM VAGINAL NIGHTLY
Qty: 1 G | Refills: 0 | Status: SHIPPED | OUTPATIENT
Start: 2024-01-10 | End: 2024-01-17

## 2024-01-23 ENCOUNTER — E-VISIT (OUTPATIENT)
Dept: GYNECOLOGY | Age: 34
End: 2024-01-23
Payer: COMMERCIAL

## 2024-01-23 DIAGNOSIS — R30.0 DYSURIA: Primary | ICD-10-CM

## 2024-01-23 PROCEDURE — 99421 OL DIG E/M SVC 5-10 MIN: CPT | Performed by: OBSTETRICS & GYNECOLOGY

## 2024-01-23 RX ORDER — CIPROFLOXACIN 500 MG/1
500 TABLET, FILM COATED ORAL 2 TIMES DAILY
Qty: 14 TABLET | Refills: 0 | Status: SHIPPED | OUTPATIENT
Start: 2024-01-23 | End: 2024-01-30

## 2024-01-23 RX ORDER — FLUCONAZOLE 150 MG/1
150 TABLET ORAL ONCE
Qty: 1 TABLET | Refills: 1 | Status: SHIPPED | OUTPATIENT
Start: 2024-01-23 | End: 2024-01-23

## 2024-01-23 NOTE — PROGRESS NOTES
Patient is a 33 year old F with dysuria. Patient with urinary frequency. Patient with urinary odor.     Review of Systems: as above  General ROS: negative  Psychological ROS: negative  Ophthalmic ROS: negative  ENT ROS: negative  Allergy and Immunology ROS: negative  Hematological and Lymphatic ROS: negative  Endocrine ROS: negative  Breast ROS: negative  Respiratory ROS: negative  Cardiovascular ROS: negative  Gastrointestinal ROS: negative  Genito-Urinary ROS: as above  Musculoskeletal ROS: negative  Neurological ROS: negative  Dermatological ROS: negative     Date of Birth 1990  Past Medical History:   Diagnosis Date    Asthma     controlled    Generalized anxiety disorder     Moderate episode of recurrent major depressive disorder (HCC) 2018    Morbid obesity with BMI of 50.0-59.9, adult ([facility])     Unspecified vitamin D deficiency 2011    Vertigo      Past Surgical History:   Procedure Laterality Date    DILATION AND CURETTAGE OF UTERUS  2013    ENDOSCOPY, COLON, DIAGNOSTIC      Pt denies colonoscopy (21)    SLEEVE GASTRECTOMY N/A 2021    ROBOTIC ASSISTED LAPAROSCOPIC SLEEVE GASTRECTOMY performed by Yovani Landon DO at [facility] OR    UPPER GASTROINTESTINAL ENDOSCOPY N/A 05/10/2021    EGD BIOPSY performed by Yovani Landon DO at [facility] ENDOSCOPY     OB History    Para Term  AB Living   4 1 1 0 2 1   SAB IAB Ectopic Molar Multiple Live Births   0 2 0   0 1      # Outcome Date GA Lbr Be/2nd Weight Sex Delivery Anes PTL Lv   4          FD   3 Term  40w0d   F Vag-Spont   RANCHO   2 IAB         FD   1 IAB         FD     Social History     Socioeconomic History    Marital status: Single     Spouse name: Not on file    Number of children: Not on file    Years of education: Not on file    Highest education level: Not on file   Occupational History    Not on file   Tobacco Use    Smoking status: Never    Smokeless tobacco: Never   Vaping Use    Vaping Use: Never used   Substance and

## 2024-01-30 ENCOUNTER — TELEPHONE (OUTPATIENT)
Dept: GYNECOLOGY | Age: 34
End: 2024-01-30

## 2024-01-30 DIAGNOSIS — R30.0 DYSURIA: ICD-10-CM

## 2024-01-30 NOTE — TELEPHONE ENCOUNTER
Tell patient that she needs to do the urine culture that I ordered. Please inform her to go to the lab to get done.

## 2024-01-30 NOTE — TELEPHONE ENCOUNTER
Patient is being treated for UTI and hasn't noticed a change and was wondering if she needs an appointment     Patient can be reached at [phone]

## 2024-01-31 ENCOUNTER — HOSPITAL ENCOUNTER (OUTPATIENT)
Dept: ULTRASOUND IMAGING | Age: 34
Discharge: HOME | End: 2024-01-31
Attending: OBSTETRICS & GYNECOLOGY
Payer: COMMERCIAL

## 2024-01-31 DIAGNOSIS — R33.9 INCOMPLETE BLADDER EMPTYING: ICD-10-CM

## 2024-01-31 LAB — BACTERIA UR CULT: NORMAL

## 2024-01-31 PROCEDURE — 76830 TRANSVAGINAL US NON-OB: CPT

## 2024-01-31 PROCEDURE — 76856 US EXAM PELVIC COMPLETE: CPT

## 2024-02-07 ENCOUNTER — TELEPHONE (OUTPATIENT)
Dept: GYNECOLOGY | Age: 34
End: 2024-02-07

## 2024-02-07 DIAGNOSIS — R35.0 URINARY FREQUENCY: Primary | ICD-10-CM

## 2024-02-07 NOTE — TELEPHONE ENCOUNTER
While speaking to patient about her results she stated she is still having pelvic pain its more of a cramping. She has been made aware that we will put her on a cancellation list. She also is still having urinary symptoms a referral has been put into system for her to see Urologist

## 2024-02-12 ENCOUNTER — OFFICE VISIT (OUTPATIENT)
Dept: GYNECOLOGY | Age: 34
End: 2024-02-12
Payer: COMMERCIAL

## 2024-02-12 VITALS
SYSTOLIC BLOOD PRESSURE: 118 MMHG | WEIGHT: 293 LBS | BODY MASS INDEX: 43.4 KG/M2 | HEIGHT: 69 IN | TEMPERATURE: 97.2 F | HEART RATE: 78 BPM | OXYGEN SATURATION: 98 % | DIASTOLIC BLOOD PRESSURE: 78 MMHG

## 2024-02-12 DIAGNOSIS — N94.6 DYSMENORRHEA: ICD-10-CM

## 2024-02-12 DIAGNOSIS — R10.2 PELVIC PAIN IN FEMALE: ICD-10-CM

## 2024-02-12 DIAGNOSIS — R30.0 DYSURIA: Primary | ICD-10-CM

## 2024-02-12 PROCEDURE — 99213 OFFICE O/P EST LOW 20 MIN: CPT | Performed by: OBSTETRICS & GYNECOLOGY

## 2024-02-12 RX ORDER — RELUGOLIX, ESTRADIOL HEMIHYDRATE, AND NORETHINDRONE ACETATE 40; 1; .5 MG/1; MG/1; MG/1
1 TABLET, FILM COATED ORAL DAILY
Qty: 90 TABLET | Refills: 3 | Status: SHIPPED | OUTPATIENT
Start: 2024-02-12

## 2024-02-13 NOTE — PROGRESS NOTES
controlled    Generalized anxiety disorder     Moderate episode of recurrent major depressive disorder (HCC) 07/19/2018    Morbid obesity with BMI of 50.0-59.9, adult (HCC)     Unspecified vitamin D deficiency 04/13/2011    Vertigo        Past Surgical History:   Procedure Laterality Date    DILATION AND CURETTAGE OF UTERUS  04/19/2013    ENDOSCOPY, COLON, DIAGNOSTIC      Pt denies colonoscopy (9/14/21)    SLEEVE GASTRECTOMY N/A 9/14/2021    ROBOTIC ASSISTED LAPAROSCOPIC SLEEVE GASTRECTOMY performed by Yovani Landon DO at [facility] OR    UPPER GASTROINTESTINAL ENDOSCOPY N/A 05/10/2021    EGD BIOPSY performed by Yovani Landon DO at [facility] ENDOSCOPY       Social History     Socioeconomic History    Marital status: Single     Spouse name: Not on file    Number of children: Not on file    Years of education: Not on file    Highest education level: Not on file   Occupational History    Not on file   Tobacco Use    Smoking status: Never    Smokeless tobacco: Never   Vaping Use    Vaping Use: Never used   Substance and Sexual Activity    Alcohol use: Yes     Comment: rare    Drug use: No    Sexual activity: Yes     Partners: Male   Other Topics Concern    Not on file   Social History Narrative    Not on file     Social Determinants of Health     Financial Resource Strain: Low Risk  (4/14/2023)    Overall Financial Resource Strain (CARDIA)     Difficulty of Paying Living Expenses: Not hard at all   Food Insecurity: Not on file (4/14/2023)   Transportation Needs: Unknown (4/14/2023)    PRAPARE - Transportation     Lack of Transportation (Medical): Not on file     Lack of Transportation (Non-Medical): No   Physical Activity: Not on file   Stress: Not on file   Social Connections: Not on file   Intimate Partner Violence: Not on file   Housing Stability: Unknown (4/14/2023)    Housing Stability Vital Sign     Unable to Pay for Housing in the Last Year: Not on file     Number of Places Lived in the Last Year: Not on file     Unstable

## 2024-02-14 LAB — BACTERIA UR CULT: NORMAL

## 2024-02-27 ENCOUNTER — TELEPHONE (OUTPATIENT)
Dept: GYNECOLOGY | Age: 34
End: 2024-02-27

## 2024-02-27 DIAGNOSIS — N92.6 IRREGULAR MENSTRUAL BLEEDING: Primary | ICD-10-CM

## 2024-02-27 RX ORDER — VITAMIN A ACETATE, BETA CAROTENE, ASCORBIC ACID, CHOLECALCIFEROL, .ALPHA.-TOCOPHEROL ACETATE, DL-, THIAMINE MONONITRATE, RIBOFLAVIN, NIACINAMIDE, PYRIDOXINE HYDROCHLORIDE, FOLIC ACID, CYANOCOBALAMIN, CALCIUM CARBONATE, FERROUS FUMARATE, ZINC OXIDE, CUPRIC OXIDE 3080; 12; 120; 400; 1; 1.84; 3; 20; 22; 920; 25; 200; 27; 10; 2 [IU]/1; UG/1; MG/1; [IU]/1; MG/1; MG/1; MG/1; MG/1; MG/1; [IU]/1; MG/1; MG/1; MG/1; MG/1; MG/1
1 TABLET, FILM COATED ORAL DAILY
Qty: 90 TABLET | Refills: 3 | Status: SHIPPED | OUTPATIENT
Start: 2024-02-27

## 2024-02-27 NOTE — TELEPHONE ENCOUNTER
Patient was seen at [facility] ER yesterday (urinary & abdominal issues). She needs to follow up with Dr Rosario. Said that she had a positive pregnancy test but they didn't see anything in ultrasound. Patient said she was told that she needs to be tested again within 48 hours. Please advise. Patient can be reached at [phone]

## 2024-02-27 NOTE — TELEPHONE ENCOUNTER
Tell patient that she needs to go to lab tomorrow to get pregnancy level done. Her pregnancy level was too low to see anything. Once her level hits 5000, then we will order another ultrasound.     I called in a prenatal vitamin for patient.     Tell patient to make sure she is not taking myfembree.

## 2024-02-28 DIAGNOSIS — N92.6 IRREGULAR MENSTRUAL BLEEDING: ICD-10-CM

## 2024-02-28 LAB
ABO + RH BLD: NORMAL
B-HCG SERPL EIA 3RD IS-ACNC: 1786 MIU/ML
BLD GP AB SCN SERPL QL: NORMAL
DEPRECATED RDW RBC AUTO: 17 % (ref 12.4–15.4)
HCT VFR BLD AUTO: 42 % (ref 36–48)
HGB BLD-MCNC: 14.2 G/DL (ref 12–16)
MCH RBC QN AUTO: 28.9 PG (ref 26–34)
MCHC RBC AUTO-ENTMCNC: 33.9 G/DL (ref 31–36)
MCV RBC AUTO: 85.3 FL (ref 80–100)
PLATELET # BLD AUTO: 287 K/UL (ref 135–450)
PMV BLD AUTO: 9.5 FL (ref 5–10.5)
PROGEST SERPL-MCNC: 8.58 NG/ML
RBC # BLD AUTO: 4.92 M/UL (ref 4–5.2)
WBC # BLD AUTO: 10.1 K/UL (ref 4–11)

## 2024-03-04 DIAGNOSIS — Z34.90 PREGNANCY, UNSPECIFIED GESTATIONAL AGE: Primary | ICD-10-CM

## 2024-03-04 DIAGNOSIS — Z34.90 PREGNANCY, UNSPECIFIED GESTATIONAL AGE: ICD-10-CM

## 2024-03-04 LAB — B-HCG SERPL EIA 3RD IS-ACNC: 6123 MIU/ML

## 2024-03-05 DIAGNOSIS — N92.6 IRREGULAR MENSTRUAL BLEEDING: Primary | ICD-10-CM

## 2024-03-06 ENCOUNTER — HOSPITAL ENCOUNTER (OUTPATIENT)
Dept: ULTRASOUND IMAGING | Age: 34
Discharge: HOME | End: 2024-03-06
Attending: OBSTETRICS & GYNECOLOGY
Payer: COMMERCIAL

## 2024-03-06 DIAGNOSIS — N92.6 IRREGULAR MENSTRUAL BLEEDING: ICD-10-CM

## 2024-03-06 PROCEDURE — 76817 TRANSVAGINAL US OBSTETRIC: CPT

## 2024-03-07 ENCOUNTER — TELEPHONE (OUTPATIENT)
Dept: GYNECOLOGY | Age: 34
End: 2024-03-07

## 2024-03-07 DIAGNOSIS — Z34.90 PREGNANCY, UNSPECIFIED GESTATIONAL AGE: Primary | ICD-10-CM

## 2024-03-07 NOTE — TELEPHONE ENCOUNTER
Called and spoke to patient about US matching up with levels. Need to repeat US to hopefully see embryo next week. Patient understands. No sign of molar pregnancy at this time.

## 2024-03-07 NOTE — TELEPHONE ENCOUNTER
Called and spoke to patient, went over her results with her. She says that she would like an appointment to speak with Dr. Rosario about this situation. She says would this pregnancy be healthy with her having fibroids and the other issues she has. Informed patient that a message would be put into Dr Rosario    Patient can be contacted at [phone]

## 2024-03-08 ENCOUNTER — TELEPHONE (OUTPATIENT)
Dept: GYNECOLOGY | Age: 34
End: 2024-03-08

## 2024-03-08 NOTE — TELEPHONE ENCOUNTER
Tell her it is because she is pregnant right now.     She will need to see how her bladder is after pregnancy, then we can see if we need urology to reassess.

## 2024-03-08 NOTE — TELEPHONE ENCOUNTER
Patient called office stating the she was seen by urologist and was told that there is nothing that can be done and to follow up with Dr. Rosario.    Patient can be reached at [phone]

## 2024-03-13 ENCOUNTER — OFFICE VISIT (OUTPATIENT)
Dept: PRIMARY CARE CLINIC | Age: 34
End: 2024-03-13
Payer: COMMERCIAL

## 2024-03-13 VITALS
DIASTOLIC BLOOD PRESSURE: 78 MMHG | TEMPERATURE: 98.4 F | HEART RATE: 86 BPM | BODY MASS INDEX: 43.4 KG/M2 | WEIGHT: 293 LBS | HEIGHT: 69 IN | SYSTOLIC BLOOD PRESSURE: 124 MMHG | OXYGEN SATURATION: 99 %

## 2024-03-13 DIAGNOSIS — K62.9 RECTAL ABNORMALITY: Primary | ICD-10-CM

## 2024-03-13 DIAGNOSIS — N31.8 FREQUENCY-URGENCY SYNDROME: ICD-10-CM

## 2024-03-13 LAB
BILIRUB UR QL STRIP.AUTO: NEGATIVE
CLARITY UR: ABNORMAL
COLOR UR: YELLOW
CRYSTALS URNS MICRO: ABNORMAL /HPF
EPI CELLS #/AREA URNS HPF: ABNORMAL /HPF (ref 0–5)
GLUCOSE UR STRIP.AUTO-MCNC: NEGATIVE MG/DL
HGB UR QL STRIP.AUTO: NEGATIVE
KETONES UR STRIP.AUTO-MCNC: 15 MG/DL
LEUKOCYTE ESTERASE UR QL STRIP.AUTO: ABNORMAL
MUCOUS THREADS #/AREA URNS LPF: ABNORMAL /LPF
NITRITE UR QL STRIP.AUTO: NEGATIVE
PH UR STRIP.AUTO: 6 [PH] (ref 5–8)
PROT UR STRIP.AUTO-MCNC: 30 MG/DL
RBC #/AREA URNS HPF: ABNORMAL /HPF (ref 0–4)
SP GR UR STRIP.AUTO: 1.03 (ref 1–1.03)
UA DIPSTICK W REFLEX MICRO PNL UR: YES
URN SPEC COLLECT METH UR: ABNORMAL
UROBILINOGEN UR STRIP-ACNC: 1 E.U./DL
WBC #/AREA URNS HPF: ABNORMAL /HPF (ref 0–5)

## 2024-03-13 PROCEDURE — 99214 OFFICE O/P EST MOD 30 MIN: CPT | Performed by: INTERNAL MEDICINE

## 2024-03-13 RX ORDER — HYDROCORTISONE ACETATE 25 MG/1
25 SUPPOSITORY RECTAL 2 TIMES DAILY
Qty: 12 SUPPOSITORY | Refills: 0 | Status: SHIPPED | OUTPATIENT
Start: 2024-03-13

## 2024-03-13 ASSESSMENT — ENCOUNTER SYMPTOMS
WHEEZING: 0
CHEST TIGHTNESS: 0
RECTAL PAIN: 1
DIARRHEA: 1
COUGH: 0
ABDOMINAL DISTENTION: 0
SHORTNESS OF BREATH: 0
CONSTIPATION: 0
BLOOD IN STOOL: 0
ABDOMINAL PAIN: 0
EYES NEGATIVE: 1

## 2024-03-13 ASSESSMENT — PATIENT HEALTH QUESTIONNAIRE - PHQ9
SUM OF ALL RESPONSES TO PHQ QUESTIONS 1-9: 0
SUM OF ALL RESPONSES TO PHQ QUESTIONS 1-9: 0
SUM OF ALL RESPONSES TO PHQ9 QUESTIONS 1 & 2: 0
SUM OF ALL RESPONSES TO PHQ QUESTIONS 1-9: 0
SUM OF ALL RESPONSES TO PHQ QUESTIONS 1-9: 0
1. LITTLE INTEREST OR PLEASURE IN DOING THINGS: 0
2. FEELING DOWN, DEPRESSED OR HOPELESS: 0

## 2024-03-13 NOTE — PROGRESS NOTES
External Referral To Proctology    Frequency-urgency syndrome  -     Urinalysis                       BMI  44     . BMI > 47    . Was  BMI> 53 .  S/p   Gastric Sleeve   -               Plan:              ATILIO MCDONALD MD

## 2024-03-14 RX ORDER — VITAMIN A ACETATE, BETA CAROTENE, ASCORBIC ACID, CHOLECALCIFEROL, .ALPHA.-TOCOPHEROL ACETATE, DL-, THIAMINE MONONITRATE, RIBOFLAVIN, NIACINAMIDE, PYRIDOXINE HYDROCHLORIDE, FOLIC ACID, CYANOCOBALAMIN, CALCIUM CARBONATE, FERROUS FUMARATE, ZINC OXIDE, CUPRIC OXIDE 3080; 12; 120; 400; 1; 1.84; 3; 20; 22; 920; 25; 200; 27; 10; 2 [IU]/1; UG/1; MG/1; [IU]/1; MG/1; MG/1; MG/1; MG/1; MG/1; [IU]/1; MG/1; MG/1; MG/1; MG/1; MG/1
1 TABLET, FILM COATED ORAL DAILY
Qty: 90 TABLET | Refills: 3 | Status: SHIPPED | OUTPATIENT
Start: 2024-03-14

## 2024-03-15 ENCOUNTER — TELEPHONE (OUTPATIENT)
Dept: SURGERY | Age: 34
End: 2024-03-15

## 2024-03-15 ENCOUNTER — PATIENT MESSAGE (OUTPATIENT)
Dept: PRIMARY CARE CLINIC | Age: 34
End: 2024-03-15

## 2024-03-15 ENCOUNTER — OFFICE VISIT (OUTPATIENT)
Dept: SURGERY | Age: 34
End: 2024-03-15
Payer: COMMERCIAL

## 2024-03-15 ENCOUNTER — HOSPITAL ENCOUNTER (OUTPATIENT)
Dept: ULTRASOUND IMAGING | Age: 34
Discharge: HOME | End: 2024-03-15
Payer: COMMERCIAL

## 2024-03-15 VITALS
BODY MASS INDEX: 44.45 KG/M2 | HEART RATE: 79 BPM | SYSTOLIC BLOOD PRESSURE: 122 MMHG | TEMPERATURE: 98.2 F | WEIGHT: 293 LBS | DIASTOLIC BLOOD PRESSURE: 79 MMHG

## 2024-03-15 DIAGNOSIS — Z34.90 PREGNANCY, UNSPECIFIED GESTATIONAL AGE: ICD-10-CM

## 2024-03-15 DIAGNOSIS — R19.7 DIARRHEA, UNSPECIFIED TYPE: ICD-10-CM

## 2024-03-15 DIAGNOSIS — K60.2 ANAL FISSURE: Primary | ICD-10-CM

## 2024-03-15 PROCEDURE — 76817 TRANSVAGINAL US OBSTETRIC: CPT

## 2024-03-15 PROCEDURE — 99204 OFFICE O/P NEW MOD 45 MIN: CPT | Performed by: SURGERY

## 2024-03-15 NOTE — TELEPHONE ENCOUNTER
From: Lory Cr  To: Dr. uMsa Khoury  Sent: 3/15/2024 10:23 AM EDT  Subject: Urinary symptoms     Good morning. I was wondering after reviewing the urine culture, was there anything that I could be given for the discomfort? Was there anything abnormal about my results?

## 2024-03-15 NOTE — PROGRESS NOTES
[facility] SPECIALTY CARE [facility] COLON AND RECTAL SURGERY  [address]  [address]  [place] [address]  Dept: [phone]  Dept Fax: [phone]  Loc: [phone]    Visit Date: 3/15/2024    Lory Cr is a 33 y.o. female who presents today for: New Patient (hemorrhoids)      HPI:       Lory Cr is a 33 y.o. female referred  for anorectal discomfort.    Patient has had several weeks of anorectal pain and discomfort. Symptoms occur after BMs.    Has been recently experiencing a lot of diarrhea due to recent bariatric surgery.    Bleeding: no  Constipation: yes  Patient has tried: anusol supp  Previous similar symptoms: no  Previous anorectal surgeries: no    Denies fever, chills, abd pain, nausea, emesis, weight loss.    Patient's problem list, medications, past medical, surgical, family, and social histories were reviewed and updated in the chart as indicated today.    Past Medical History:   Diagnosis Date    Asthma     controlled    Generalized anxiety disorder     Moderate episode of recurrent major depressive disorder (HCC) 07/19/2018    Morbid obesity with BMI of 50.0-59.9, adult (HCC)     Unspecified vitamin D deficiency 04/13/2011    Vertigo        Past Surgical History:   Procedure Laterality Date    DILATION AND CURETTAGE OF UTERUS  04/19/2013    ENDOSCOPY, COLON, DIAGNOSTIC      Pt denies colonoscopy (9/14/21)    SLEEVE GASTRECTOMY N/A 9/14/2021    ROBOTIC ASSISTED LAPAROSCOPIC SLEEVE GASTRECTOMY performed by Yovani Landon DO at [facility] OR    UPPER GASTROINTESTINAL ENDOSCOPY N/A 05/10/2021    EGD BIOPSY performed by Yovani Landon DO at [facility] ENDOSCOPY       Family History: Family history of colorectal cancer/polyps: no    Social History:   Social History     Tobacco Use    Smoking status: Never    Smokeless tobacco: Never   Substance Use Topics    Alcohol use: Yes     Comment: rare      Tobacco cessation counseling provided as

## 2024-03-15 NOTE — TELEPHONE ENCOUNTER
Prescription called in to [facility] for nifedipine 0.3%, lidocaine 5%. Instructed to use BID. 2 refills requested.

## 2024-03-20 DIAGNOSIS — K62.9 RECTAL ABNORMALITY: ICD-10-CM

## 2024-03-20 RX ORDER — HYDROCORTISONE ACETATE 25 MG/1
25 SUPPOSITORY RECTAL 2 TIMES DAILY
Qty: 12 SUPPOSITORY | Refills: 0 | OUTPATIENT
Start: 2024-03-20

## 2024-03-21 DIAGNOSIS — Z34.90 PREGNANCY, UNSPECIFIED GESTATIONAL AGE: Primary | ICD-10-CM

## 2024-03-28 ENCOUNTER — HOSPITAL ENCOUNTER (OUTPATIENT)
Dept: ULTRASOUND IMAGING | Age: 34
Discharge: HOME | End: 2024-03-28
Payer: COMMERCIAL

## 2024-03-28 ENCOUNTER — TELEPHONE (OUTPATIENT)
Dept: GYNECOLOGY | Age: 34
End: 2024-03-28

## 2024-03-28 DIAGNOSIS — Z34.90 PREGNANCY, UNSPECIFIED GESTATIONAL AGE: ICD-10-CM

## 2024-03-28 PROCEDURE — 76817 TRANSVAGINAL US OBSTETRIC: CPT

## 2024-03-29 ENCOUNTER — PREP FOR PROCEDURE (OUTPATIENT)
Dept: GYNECOLOGY | Age: 34
End: 2024-03-29

## 2024-03-29 DIAGNOSIS — O03.9 MISCARRIAGE: ICD-10-CM

## 2024-03-29 NOTE — PROGRESS NOTES
[facility] PRE-SURGICAL TESTING INSTRUCTIONS                      PRIOR TO PROCEDURE DATE:    1. PLEASE FOLLOW ANY INSTRUCTIONS GIVEN TO YOU PER YOUR SURGEON.      2. Arrange for someone to drive you home and be with you for the first 24 hours after discharge for your safety after your procedure for which you received sedation. Ensure it is someone we can share information with regarding your discharge.     NOTE: At this time ONLY 2 ADULTS may accompany you   One person ENCOURAGED to stay at hospital entire time if outpatient surgery      3. You must contact your surgeon for instructions IF:  You are taking any blood thinners, aspirin, anti-inflammatory or vitamins.  There is a change in your physical condition such as a cold, fever, rash, cuts, sores, or any other infection, especially near your surgical site.    4. Do not drink alcohol the day before or day of your procedure.  Do not use any recreational marijuana at least 24 hours or street drugs (heroin, cocaine) at minimum 5 days prior to your procedure.     5. A Pre-Surgical History and Physical MUST be completed WITHIN 30 DAYS OR LESS prior to your procedure.by your Physician or an Urgent Care        THE DAY OF YOUR PROCEDURE:  1.  Follow instructions for ARRIVAL TIME as DIRECTED BY YOUR SURGEON.     2. Enter the MAIN entrance from [facility] and follow the signs to the free Parking Garage or  Parking (offered free of charge 7 am-5pm).      3. Enter the Main Entrance of the hospital (do not enter from the lower level of the parking garage). Upon entrance, check in with the  at the surgical information desk on your LEFT.   Bring your insurance card and photo ID to register      4. DO NOT EAT ANYTHING 8 hours prior to arrival for surgery.  You may have up to 8 ounces of water 4 hours prior to your arrival for surgery.   NOTE: ALL Gastric, Bariatric & Bowel surgery patients - you MUST follow your surgeon's instructions regarding

## 2024-03-31 ENCOUNTER — ANESTHESIA EVENT (OUTPATIENT)
Dept: OPERATING ROOM | Age: 34
End: 2024-03-31
Payer: COMMERCIAL

## 2024-03-31 RX ORDER — SODIUM CHLORIDE 9 MG/ML
INJECTION, SOLUTION INTRAVENOUS PRN
Status: CANCELLED | OUTPATIENT
Start: 2024-04-01

## 2024-03-31 RX ORDER — SODIUM CHLORIDE, SODIUM LACTATE, POTASSIUM CHLORIDE, CALCIUM CHLORIDE 600; 310; 30; 20 MG/100ML; MG/100ML; MG/100ML; MG/100ML
INJECTION, SOLUTION INTRAVENOUS CONTINUOUS
Status: CANCELLED | OUTPATIENT
Start: 2024-04-01

## 2024-03-31 RX ORDER — SODIUM CHLORIDE 0.9 % (FLUSH) 0.9 %
5-40 SYRINGE (ML) INJECTION EVERY 12 HOURS SCHEDULED
Status: CANCELLED | OUTPATIENT
Start: 2024-04-01

## 2024-03-31 RX ORDER — METRONIDAZOLE 500 MG/100ML
500 INJECTION, SOLUTION INTRAVENOUS ONCE
Status: CANCELLED | OUTPATIENT
Start: 2024-04-01 | End: 2024-03-31

## 2024-03-31 RX ORDER — SODIUM CHLORIDE 0.9 % (FLUSH) 0.9 %
5-40 SYRINGE (ML) INJECTION PRN
Status: CANCELLED | OUTPATIENT
Start: 2024-04-01

## 2024-04-01 ENCOUNTER — ANESTHESIA (OUTPATIENT)
Dept: OPERATING ROOM | Age: 34
End: 2024-04-01
Payer: COMMERCIAL

## 2024-04-01 ENCOUNTER — PREP FOR PROCEDURE (OUTPATIENT)
Dept: SURGERY | Age: 34
End: 2024-04-01

## 2024-04-01 ENCOUNTER — HOSPITAL ENCOUNTER (OUTPATIENT)
Age: 34
Setting detail: OUTPATIENT SURGERY
Discharge: HOME | End: 2024-04-01
Attending: OBSTETRICS & GYNECOLOGY | Admitting: OBSTETRICS & GYNECOLOGY
Payer: COMMERCIAL

## 2024-04-01 VITALS
WEIGHT: 249.8 LBS | HEIGHT: 69 IN | HEART RATE: 99 BPM | DIASTOLIC BLOOD PRESSURE: 53 MMHG | SYSTOLIC BLOOD PRESSURE: 140 MMHG | TEMPERATURE: 97.6 F | BODY MASS INDEX: 37 KG/M2 | OXYGEN SATURATION: 97 % | RESPIRATION RATE: 16 BRPM

## 2024-04-01 DIAGNOSIS — Z98.890 S/P DILATATION AND CURETTAGE: Primary | ICD-10-CM

## 2024-04-01 DIAGNOSIS — K60.2 ANAL FISSURE: ICD-10-CM

## 2024-04-01 DIAGNOSIS — O03.9 MISCARRIAGE: ICD-10-CM

## 2024-04-01 LAB
ABO + RH BLD: NORMAL
ALBUMIN SERPL-MCNC: 3.8 G/DL (ref 3.4–5)
ALBUMIN/GLOB SERPL: 1 {RATIO} (ref 1.1–2.2)
ALP SERPL-CCNC: 54 U/L (ref 40–129)
ALT SERPL-CCNC: 20 U/L (ref 10–40)
ANION GAP SERPL CALCULATED.3IONS-SCNC: 8 MMOL/L (ref 3–16)
AST SERPL-CCNC: 38 U/L (ref 15–37)
BILIRUB SERPL-MCNC: 0.6 MG/DL (ref 0–1)
BLD GP AB SCN SERPL QL: NORMAL
BUN SERPL-MCNC: 5 MG/DL (ref 7–20)
CALCIUM SERPL-MCNC: 9 MG/DL (ref 8.3–10.6)
CHLORIDE SERPL-SCNC: 102 MMOL/L (ref 99–110)
CO2 SERPL-SCNC: 23 MMOL/L (ref 21–32)
CREAT SERPL-MCNC: 0.5 MG/DL (ref 0.6–1.1)
DEPRECATED RDW RBC AUTO: 17.3 % (ref 12.4–15.4)
EKG ATRIAL RATE: 84 BPM
EKG DIAGNOSIS: NORMAL
EKG P AXIS: 46 DEGREES
EKG P-R INTERVAL: 176 MS
EKG Q-T INTERVAL: 384 MS
EKG QRS DURATION: 82 MS
EKG QTC CALCULATION (BAZETT): 453 MS
EKG R AXIS: 18 DEGREES
EKG T AXIS: 13 DEGREES
EKG VENTRICULAR RATE: 84 BPM
GFR SERPLBLD CREATININE-BSD FMLA CKD-EPI: >90 ML/MIN/{1.73_M2}
GLUCOSE SERPL-MCNC: 102 MG/DL (ref 70–99)
HCG UR QL: POSITIVE
HCT VFR BLD AUTO: 45.5 % (ref 36–48)
HGB BLD-MCNC: 15.3 G/DL (ref 12–16)
MCH RBC QN AUTO: 29.2 PG (ref 26–34)
MCHC RBC AUTO-ENTMCNC: 33.7 G/DL (ref 31–36)
MCV RBC AUTO: 86.6 FL (ref 80–100)
PLATELET # BLD AUTO: 301 K/UL (ref 135–450)
PMV BLD AUTO: 8.7 FL (ref 5–10.5)
POTASSIUM SERPL-SCNC: 5.7 MMOL/L (ref 3.5–5.1)
PROT SERPL-MCNC: 7.5 G/DL (ref 6.4–8.2)
RBC # BLD AUTO: 5.25 M/UL (ref 4–5.2)
SODIUM SERPL-SCNC: 133 MMOL/L (ref 136–145)
WBC # BLD AUTO: 9.2 K/UL (ref 4–11)

## 2024-04-01 PROCEDURE — 7100000000 HC PACU RECOVERY - FIRST 15 MIN: Performed by: OBSTETRICS & GYNECOLOGY

## 2024-04-01 PROCEDURE — 2580000003 HC RX 258: Performed by: OBSTETRICS & GYNECOLOGY

## 2024-04-01 PROCEDURE — 59820 CARE OF MISCARRIAGE: CPT | Performed by: OBSTETRICS & GYNECOLOGY

## 2024-04-01 PROCEDURE — 2709999900 HC NON-CHARGEABLE SUPPLY: Performed by: OBSTETRICS & GYNECOLOGY

## 2024-04-01 PROCEDURE — 3700000001 HC ADD 15 MINUTES (ANESTHESIA): Performed by: OBSTETRICS & GYNECOLOGY

## 2024-04-01 PROCEDURE — 3600000004 HC SURGERY LEVEL 4 BASE: Performed by: OBSTETRICS & GYNECOLOGY

## 2024-04-01 PROCEDURE — 86901 BLOOD TYPING SEROLOGIC RH(D): CPT

## 2024-04-01 PROCEDURE — 2500000003 HC RX 250 WO HCPCS

## 2024-04-01 PROCEDURE — 80053 COMPREHEN METABOLIC PANEL: CPT

## 2024-04-01 PROCEDURE — 86900 BLOOD TYPING SEROLOGIC ABO: CPT

## 2024-04-01 PROCEDURE — 6360000002 HC RX W HCPCS

## 2024-04-01 PROCEDURE — 93010 ELECTROCARDIOGRAM REPORT: CPT | Performed by: INTERNAL MEDICINE

## 2024-04-01 PROCEDURE — 6360000002 HC RX W HCPCS: Performed by: OBSTETRICS & GYNECOLOGY

## 2024-04-01 PROCEDURE — 93005 ELECTROCARDIOGRAM TRACING: CPT | Performed by: OBSTETRICS & GYNECOLOGY

## 2024-04-01 PROCEDURE — 85027 COMPLETE CBC AUTOMATED: CPT

## 2024-04-01 PROCEDURE — 7100000001 HC PACU RECOVERY - ADDTL 15 MIN: Performed by: OBSTETRICS & GYNECOLOGY

## 2024-04-01 PROCEDURE — 86850 RBC ANTIBODY SCREEN: CPT

## 2024-04-01 PROCEDURE — 7100000011 HC PHASE II RECOVERY - ADDTL 15 MIN: Performed by: OBSTETRICS & GYNECOLOGY

## 2024-04-01 PROCEDURE — 2580000003 HC RX 258: Performed by: ANESTHESIOLOGY

## 2024-04-01 PROCEDURE — 88305 TISSUE EXAM BY PATHOLOGIST: CPT

## 2024-04-01 PROCEDURE — 7100000010 HC PHASE II RECOVERY - FIRST 15 MIN: Performed by: OBSTETRICS & GYNECOLOGY

## 2024-04-01 PROCEDURE — 3600000014 HC SURGERY LEVEL 4 ADDTL 15MIN: Performed by: OBSTETRICS & GYNECOLOGY

## 2024-04-01 PROCEDURE — 84703 CHORIONIC GONADOTROPIN ASSAY: CPT

## 2024-04-01 PROCEDURE — 3700000000 HC ANESTHESIA ATTENDED CARE: Performed by: OBSTETRICS & GYNECOLOGY

## 2024-04-01 PROCEDURE — 6370000000 HC RX 637 (ALT 250 FOR IP): Performed by: ANESTHESIOLOGY

## 2024-04-01 RX ORDER — HYDROMORPHONE HYDROCHLORIDE 1 MG/ML
0.5 INJECTION, SOLUTION INTRAMUSCULAR; INTRAVENOUS; SUBCUTANEOUS EVERY 5 MIN PRN
Status: DISCONTINUED | OUTPATIENT
Start: 2024-04-01 | End: 2024-04-01 | Stop reason: HOSPADM

## 2024-04-01 RX ORDER — IBUPROFEN 600 MG/1
600 TABLET ORAL EVERY 6 HOURS PRN
Qty: 40 TABLET | Refills: 1 | Status: SHIPPED | OUTPATIENT
Start: 2024-04-01

## 2024-04-01 RX ORDER — SODIUM CHLORIDE 0.9 % (FLUSH) 0.9 %
5-40 SYRINGE (ML) INJECTION PRN
Status: CANCELLED | OUTPATIENT
Start: 2024-04-08

## 2024-04-01 RX ORDER — SODIUM CHLORIDE 9 MG/ML
INJECTION, SOLUTION INTRAVENOUS PRN
Status: DISCONTINUED | OUTPATIENT
Start: 2024-04-01 | End: 2024-04-01 | Stop reason: HOSPADM

## 2024-04-01 RX ORDER — FENTANYL CITRATE 50 UG/ML
25 INJECTION, SOLUTION INTRAMUSCULAR; INTRAVENOUS EVERY 5 MIN PRN
Status: DISCONTINUED | OUTPATIENT
Start: 2024-04-01 | End: 2024-04-01 | Stop reason: HOSPADM

## 2024-04-01 RX ORDER — FENTANYL CITRATE 50 UG/ML
INJECTION, SOLUTION INTRAMUSCULAR; INTRAVENOUS PRN
Status: DISCONTINUED | OUTPATIENT
Start: 2024-04-01 | End: 2024-04-01 | Stop reason: SDUPTHER

## 2024-04-01 RX ORDER — KETAMINE HCL IN NACL, ISO-OSM 20 MG/2 ML
SYRINGE (ML) INJECTION PRN
Status: DISCONTINUED | OUTPATIENT
Start: 2024-04-01 | End: 2024-04-01 | Stop reason: SDUPTHER

## 2024-04-01 RX ORDER — SODIUM CHLORIDE 0.9 % (FLUSH) 0.9 %
5-40 SYRINGE (ML) INJECTION PRN
Status: DISCONTINUED | OUTPATIENT
Start: 2024-04-01 | End: 2024-04-01 | Stop reason: HOSPADM

## 2024-04-01 RX ORDER — ACETAMINOPHEN 325 MG/1
650 TABLET ORAL
Status: COMPLETED | OUTPATIENT
Start: 2024-04-01 | End: 2024-04-01

## 2024-04-01 RX ORDER — LIDOCAINE HYDROCHLORIDE 10 MG/ML
1 INJECTION, SOLUTION EPIDURAL; INFILTRATION; INTRACAUDAL; PERINEURAL
Status: DISCONTINUED | OUTPATIENT
Start: 2024-04-01 | End: 2024-04-01 | Stop reason: HOSPADM

## 2024-04-01 RX ORDER — ONDANSETRON 2 MG/ML
INJECTION INTRAMUSCULAR; INTRAVENOUS PRN
Status: DISCONTINUED | OUTPATIENT
Start: 2024-04-01 | End: 2024-04-01 | Stop reason: SDUPTHER

## 2024-04-01 RX ORDER — ONDANSETRON 2 MG/ML
4 INJECTION INTRAMUSCULAR; INTRAVENOUS
Status: DISCONTINUED | OUTPATIENT
Start: 2024-04-01 | End: 2024-04-01 | Stop reason: HOSPADM

## 2024-04-01 RX ORDER — MIDAZOLAM HYDROCHLORIDE 1 MG/ML
INJECTION INTRAMUSCULAR; INTRAVENOUS PRN
Status: DISCONTINUED | OUTPATIENT
Start: 2024-04-01 | End: 2024-04-01 | Stop reason: SDUPTHER

## 2024-04-01 RX ORDER — SODIUM CHLORIDE 0.9 % (FLUSH) 0.9 %
5-40 SYRINGE (ML) INJECTION EVERY 12 HOURS SCHEDULED
Status: DISCONTINUED | OUTPATIENT
Start: 2024-04-01 | End: 2024-04-01 | Stop reason: HOSPADM

## 2024-04-01 RX ORDER — LABETALOL HYDROCHLORIDE 5 MG/ML
10 INJECTION, SOLUTION INTRAVENOUS
Status: DISCONTINUED | OUTPATIENT
Start: 2024-04-01 | End: 2024-04-01 | Stop reason: HOSPADM

## 2024-04-01 RX ORDER — SODIUM CHLORIDE 9 MG/ML
INJECTION, SOLUTION INTRAVENOUS PRN
Status: CANCELLED | OUTPATIENT
Start: 2024-04-08

## 2024-04-01 RX ORDER — SODIUM CHLORIDE 0.9 % (FLUSH) 0.9 %
5-40 SYRINGE (ML) INJECTION EVERY 12 HOURS SCHEDULED
Status: CANCELLED | OUTPATIENT
Start: 2024-04-08

## 2024-04-01 RX ORDER — METHYLERGONOVINE MALEATE 0.2 MG/ML
INJECTION INTRAVENOUS PRN
Status: DISCONTINUED | OUTPATIENT
Start: 2024-04-01 | End: 2024-04-01 | Stop reason: SDUPTHER

## 2024-04-01 RX ORDER — PROPOFOL 10 MG/ML
INJECTION, EMULSION INTRAVENOUS PRN
Status: DISCONTINUED | OUTPATIENT
Start: 2024-04-01 | End: 2024-04-01 | Stop reason: SDUPTHER

## 2024-04-01 RX ORDER — NALOXONE HYDROCHLORIDE 0.4 MG/ML
INJECTION, SOLUTION INTRAMUSCULAR; INTRAVENOUS; SUBCUTANEOUS PRN
Status: DISCONTINUED | OUTPATIENT
Start: 2024-04-01 | End: 2024-04-01 | Stop reason: HOSPADM

## 2024-04-01 RX ORDER — METRONIDAZOLE 500 MG/100ML
500 INJECTION, SOLUTION INTRAVENOUS ONCE
Status: DISCONTINUED | OUTPATIENT
Start: 2024-04-01 | End: 2024-04-01 | Stop reason: HOSPADM

## 2024-04-01 RX ORDER — SODIUM CHLORIDE, SODIUM LACTATE, POTASSIUM CHLORIDE, CALCIUM CHLORIDE 600; 310; 30; 20 MG/100ML; MG/100ML; MG/100ML; MG/100ML
INJECTION, SOLUTION INTRAVENOUS CONTINUOUS
Status: DISCONTINUED | OUTPATIENT
Start: 2024-04-01 | End: 2024-04-01 | Stop reason: HOSPADM

## 2024-04-01 RX ORDER — PROCHLORPERAZINE EDISYLATE 5 MG/ML
5 INJECTION INTRAMUSCULAR; INTRAVENOUS
Status: DISCONTINUED | OUTPATIENT
Start: 2024-04-01 | End: 2024-04-01 | Stop reason: HOSPADM

## 2024-04-01 RX ORDER — IPRATROPIUM BROMIDE AND ALBUTEROL SULFATE 2.5; .5 MG/3ML; MG/3ML
1 SOLUTION RESPIRATORY (INHALATION)
Status: DISCONTINUED | OUTPATIENT
Start: 2024-04-01 | End: 2024-04-01 | Stop reason: HOSPADM

## 2024-04-01 RX ORDER — LIDOCAINE HYDROCHLORIDE 20 MG/ML
INJECTION, SOLUTION INTRAVENOUS PRN
Status: DISCONTINUED | OUTPATIENT
Start: 2024-04-01 | End: 2024-04-01 | Stop reason: SDUPTHER

## 2024-04-01 RX ORDER — DEXAMETHASONE SODIUM PHOSPHATE 4 MG/ML
INJECTION, SOLUTION INTRA-ARTICULAR; INTRALESIONAL; INTRAMUSCULAR; INTRAVENOUS; SOFT TISSUE PRN
Status: DISCONTINUED | OUTPATIENT
Start: 2024-04-01 | End: 2024-04-01 | Stop reason: SDUPTHER

## 2024-04-01 RX ORDER — OXYCODONE HYDROCHLORIDE AND ACETAMINOPHEN 5; 325 MG/1; MG/1
1-2 TABLET ORAL EVERY 4 HOURS PRN
Qty: 20 TABLET | Refills: 0 | Status: SHIPPED | OUTPATIENT
Start: 2024-04-01 | End: 2024-04-08

## 2024-04-01 RX ORDER — GLYCOPYRROLATE 0.2 MG/ML
INJECTION INTRAMUSCULAR; INTRAVENOUS PRN
Status: DISCONTINUED | OUTPATIENT
Start: 2024-04-01 | End: 2024-04-01 | Stop reason: SDUPTHER

## 2024-04-01 RX ORDER — ACETAMINOPHEN 325 MG/1
1000 TABLET ORAL ONCE
Status: CANCELLED | OUTPATIENT
Start: 2024-04-08 | End: 2024-04-01

## 2024-04-01 RX ADMIN — PROPOFOL 260 MG: 10 INJECTION, EMULSION INTRAVENOUS at 07:53

## 2024-04-01 RX ADMIN — GLYCOPYRROLATE 0.2 MG: 0.2 INJECTION INTRAMUSCULAR; INTRAVENOUS at 08:22

## 2024-04-01 RX ADMIN — MIDAZOLAM HYDROCHLORIDE 2 MG: 2 INJECTION, SOLUTION INTRAMUSCULAR; INTRAVENOUS at 07:43

## 2024-04-01 RX ADMIN — METHYLERGONOVINE MALEATE 200 MCG: 0.2 INJECTION, SOLUTION INTRAMUSCULAR; INTRAVENOUS at 08:07

## 2024-04-01 RX ADMIN — SODIUM CHLORIDE, POTASSIUM CHLORIDE, SODIUM LACTATE AND CALCIUM CHLORIDE: 600; 310; 30; 20 INJECTION, SOLUTION INTRAVENOUS at 07:12

## 2024-04-01 RX ADMIN — METRONIDAZOLE 500 MG: 500 INJECTION, SOLUTION INTRAVENOUS at 08:02

## 2024-04-01 RX ADMIN — SODIUM CHLORIDE, SODIUM LACTATE, POTASSIUM CHLORIDE, AND CALCIUM CHLORIDE: .6; .31; .03; .02 INJECTION, SOLUTION INTRAVENOUS at 07:46

## 2024-04-01 RX ADMIN — LIDOCAINE HYDROCHLORIDE 100 MG: 20 INJECTION, SOLUTION INTRAVENOUS at 07:53

## 2024-04-01 RX ADMIN — Medication 20 MG: at 08:03

## 2024-04-01 RX ADMIN — DEXAMETHASONE SODIUM PHOSPHATE 4 MG: 4 INJECTION INTRA-ARTICULAR; INTRALESIONAL; INTRAMUSCULAR; INTRAVENOUS; SOFT TISSUE at 08:02

## 2024-04-01 RX ADMIN — ONDANSETRON 4 MG: 2 INJECTION INTRAMUSCULAR; INTRAVENOUS at 08:02

## 2024-04-01 RX ADMIN — OXYTOCIN 999 MILLI-UNITS/MIN: 10 INJECTION, SOLUTION INTRAMUSCULAR; INTRAVENOUS at 08:13

## 2024-04-01 RX ADMIN — SODIUM CHLORIDE 3000 MG: 900 INJECTION INTRAVENOUS at 07:55

## 2024-04-01 RX ADMIN — ACETAMINOPHEN 650 MG: 325 TABLET ORAL at 09:50

## 2024-04-01 RX ADMIN — FENTANYL CITRATE 100 MCG: 50 INJECTION, SOLUTION INTRAMUSCULAR; INTRAVENOUS at 07:49

## 2024-04-01 ASSESSMENT — PAIN DESCRIPTION - DESCRIPTORS: DESCRIPTORS: CRAMPING

## 2024-04-01 ASSESSMENT — PAIN - FUNCTIONAL ASSESSMENT: PAIN_FUNCTIONAL_ASSESSMENT: 0-10

## 2024-04-01 NOTE — DISCHARGE INSTRUCTIONS
instructions were reviewed with patient/significant other.  The following additional patient specific information was reviewed with the patient/significant other:  [x]Procedure/physician specific instructions  [x]FAQ Surgical Site Infections  []Other-    I have read and understand the instructions given to me: ____________________________________________   (Patient/S.O. Signature)            Date/time 4/1/2024 8:49 AM                 If you smoke STOP. We care about your health!

## 2024-04-01 NOTE — BRIEF OP NOTE
Brief Postoperative Note      Patient: Lory Cr  YOB: 1990  MRN: [id number]    Date of Procedure: 4/1/2024    Pre-Op Diagnosis Codes:     * Miscarriage [O03.9], missed Ab 6 weeks    Post-Op Diagnosis: Same       Procedure(s):  DILATATION AND CURETTAGE SUCTION    Surgeon(s):  Noreen Rosario MD    Assistant:  Surgical Assistant: Tierra Richard    Anesthesia: General    Estimated Blood Loss (mL): less than 100     Complications: None    Specimens:   * No specimens in log *    Implants:  * No implants in log *      Drains: * No LDAs found *    Findings: POC      Electronically signed by Noreen Rosario MD on 4/1/2024 at 8:18 AM   Colchicine Counseling:  Patient counseled regarding adverse effects including but not limited to stomach upset (nausea, vomiting, stomach pain, or diarrhea).  Patient instructed to limit alcohol consumption while taking this medication.  Colchicine may reduce blood counts especially with prolonged use.  The patient understands that monitoring of kidney function and blood counts may be required, especially at baseline. The patient verbalized understanding of the proper use and possible adverse effects of colchicine.  All of the patient's questions and concerns were addressed.

## 2024-04-01 NOTE — ANESTHESIA PRE PROCEDURE
IntraVENous Once Noreen Rosario MD       • oxytocin (PITOCIN) 30 Units in lactated ringers IV soln 500 mL infusion  1-20 han-units/min IntraVENous Continuous Noreen Rosario MD           Allergies:  No Known Allergies    Problem List:    Patient Active Problem List   Diagnosis Code   • Obesity E66.9   • Vitamin D deficiency E55.9   • Insomnia G47.00   • Generalized anxiety disorder F41.1   • Obstructive sleep apnea G47.33   • Chronic GERD K21.9   • Miscarriage O03.9       Past Medical History:        Diagnosis Date   • Asthma     controlled   • Generalized anxiety disorder    • Migraines    • Moderate episode of recurrent major depressive disorder (HCC) 07/19/2018   • Morbid obesity with BMI of 50.0-59.9, adult (HCC)    • Unspecified vitamin D deficiency 04/13/2011   • Vertigo        Past Surgical History:        Procedure Laterality Date   • DILATION AND CURETTAGE OF UTERUS  04/19/2013   • ENDOSCOPY, COLON, DIAGNOSTIC      Pt denies colonoscopy (9/14/21)   • OTHER SURGICAL HISTORY  12/2023    REDO OF GASTRIC SLEEVE   • SLEEVE GASTRECTOMY N/A 09/14/2021    ROBOTIC ASSISTED LAPAROSCOPIC SLEEVE GASTRECTOMY performed by Yovani Landon DO at [facility] OR   • UPPER GASTROINTESTINAL ENDOSCOPY N/A 05/10/2021    EGD BIOPSY performed by Yovani Landon DO at [facility] ENDOSCOPY       Social History:    Social History     Tobacco Use   • Smoking status: Never   • Smokeless tobacco: Never   Substance Use Topics   • Alcohol use: Not Currently     Comment: rare                                Counseling given: Not Answered      Vital Signs (Current):   Vitals:    03/29/24 1327 04/01/24 0637   BP:  129/85   Pulse:  97   Resp:  16   Temp:  98 °F (36.7 °C)   TempSrc:  Temporal   SpO2:  96%   Weight: 134.3 kg (296 lb) 113.3 kg (249 lb 12.8 oz)   Height: 1.753 m (5' 9\") 1.753 m (5' 9\")                                              BP Readings from Last 3 Encounters:   04/01/24 129/85   03/15/24 122/79   03/13/24 124/78       NPO Status: Time

## 2024-04-01 NOTE — H&P
mmm  CAR-RRR  Lungs-CTA  ABD-soft, NT, ND, no hsm  EXT-no c/c/e, no cords, NT  Neuro-grossly intact    Plan-missed ab at 6 weeks-for suction d and c. All the risks, benefits alternatives discussed with patient including bleeding, blood transfusion, infection, damage to the bowel, bladder, other local organs with need for secondary surgery, anesthesia risks, blood clots in the lungs, legs, pelvis after surgery. Patient understands these risks and agrees to the procedure. Patient also understands there may be other unforseen risks.     Spoke with Dr. Cole-he can do her surgery once she is recovered from d and c.

## 2024-04-01 NOTE — PROGRESS NOTES
PACU Transfer to [facility]    Vitals:    04/01/24 0915   BP: 135/82   Pulse: 70   Resp: 21   Temp: 97.9 °F (36.6 °C)   SpO2:          Intake/Output Summary (Last 24 hours) at 4/1/2024 0920  Last data filed at 4/1/2024 0915  Gross per 24 hour   Intake 870 ml   Output --   Net 870 ml       Pain assessment:  present - adequately treated       Patient transferred to care of [facility] RN.    4/1/2024 9:20 AM

## 2024-04-01 NOTE — PROGRESS NOTES
Ambulatory Surgery/Procedure Discharge Note    Vitals:    04/01/24 0948   BP: (!) 140/53   Pulse: 99   Resp: 16   Temp: 97.6 °F (36.4 °C)   SpO2: 97%       In: 870 [P.O.:120; I.V.:750]  Out: -     Restroom use offered before discharge.  Yes    Pain assessment:  present - adequately treated  Pain Level: 7    Pt and S.O./family states \"ready to go home\". Pt alert and oriented x4. IV removed. Denies N/V or pain.   Voided prior to discharge. Pt tolerating po intake. Discharge instructions given to pt and friends with pt permission. Pt and friends verbalized understanding of all instructions. Left with all belongings, and discharge instructions. 2 prescriptions picked up from [facility] prior to d/c.    Patient discharged to home/self care. Patient discharged via wheel chair by transporter to waiting family/S.O.       4/1/2024 10:08 AM

## 2024-04-01 NOTE — PROGRESS NOTES
Pt c/o pain to her bottom. Repositioned for comfort. Pt states she's been seen by Dr. Cole for her anal fissure. Pt is alert oriented ambulatory.   Pregnancy result is positive and pt is scheduled for D&C today.

## 2024-04-02 NOTE — OP NOTE
the patient was given IV Pitocin and Methergine.  The single-tooth tenaculum was removed.  There was minimal bleeding.   The patient was then taken to the recovery room in stable condition.          REMI HOGAN MD      D:  04/02/2024 13:57:53     T:  04/02/2024 16:11:41     SONIA/DEVORA  Job #:  [id number]     Doc#:  [id number]

## 2024-04-02 NOTE — ANESTHESIA POSTPROCEDURE EVALUATION
Department of Anesthesiology  Postprocedure Note    Patient: Lory Cr  MRN: [id number]  YOB: 1990  Date of evaluation: 4/2/2024    Procedure Summary       Date: 04/01/24 Room / Location: [address]    Anesthesia Start: 0746 Anesthesia Stop: 0831    Procedure: DILATATION AND CURETTAGE SUCTION Diagnosis:       Miscarriage      (Miscarriage [O03.9])    Surgeons: Noreen Rosario MD Responsible Provider: Saad Lutz MD    Anesthesia Type: general ASA Status: 3            Anesthesia Type: No value filed.    Leonardo Phase I: Leonardo Score: 6    Leonardo Phase II: Leonardo Score: 10    Anesthesia Post Evaluation    Patient location during evaluation: PACU  Patient participation: complete - patient participated  Level of consciousness: awake  Pain score: 0  Nausea & Vomiting: no nausea and no vomiting  Cardiovascular status: blood pressure returned to baseline  Respiratory status: acceptable  Hydration status: euvolemic  Pain management: adequate    No notable events documented.

## 2024-04-03 ENCOUNTER — TELEPHONE (OUTPATIENT)
Dept: SURGERY | Age: 34
End: 2024-04-03

## 2024-04-03 PROBLEM — K60.2 ANAL FISSURE: Status: ACTIVE | Noted: 2024-04-01

## 2024-04-03 NOTE — TELEPHONE ENCOUNTER
Patient has been scheduled for:    Procedure:EUA, fissurectomy, botox injection anal fissure   Date:4/8/24  Time:7:30 AM  Arrival:5:30 AM   Hospital:[facility]     ASA?:none  Prep? NPO    Pre-op?N/A    Post-op Appt? WEST 5/9/24 at 9:00 AM     Patient advised they will need a .    Orders faxed to surgery scheduling.    Medication sent to Pharmacy:     Stents or ostomy marking?    Instructions have been mailed/emailed to: Lashanda    Added to outlook calendar

## 2024-04-04 RX ORDER — CHOLESTYRAMINE 4 G/9G
POWDER, FOR SUSPENSION ORAL
COMMUNITY
Start: 2024-01-25 | End: 2024-04-04

## 2024-04-04 RX ORDER — DIPHENOXYLATE HYDROCHLORIDE AND ATROPINE SULFATE 2.5; .025 MG/1; MG/1
1 TABLET ORAL 4 TIMES DAILY PRN
COMMUNITY
Start: 2024-03-14 | End: 2024-04-04

## 2024-04-04 RX ORDER — METOCLOPRAMIDE HYDROCHLORIDE 5 MG/5ML
SOLUTION ORAL
COMMUNITY
Start: 2024-02-16 | End: 2024-04-04

## 2024-04-04 RX ORDER — NALOXONE HYDROCHLORIDE 4 MG/.1ML
1 SPRAY NASAL PRN
COMMUNITY
Start: 2023-12-09 | End: 2024-04-04

## 2024-04-04 RX ORDER — FLUCONAZOLE 150 MG/1
TABLET ORAL
COMMUNITY
Start: 2024-01-23 | End: 2024-04-04

## 2024-04-04 RX ORDER — PANTOPRAZOLE SODIUM 40 MG/1
40 TABLET, DELAYED RELEASE ORAL
COMMUNITY
Start: 2023-12-09 | End: 2024-04-04

## 2024-04-04 RX ORDER — METHENAMINE, SODIUM PHOSPHATE, MONOBASIC, MONOHYDRATE, PHENYL SALICYLATE, METHYLENE BLUE, AND HYOSCYAMINE SULFATE 118; 40.8; 36; 10; .12 MG/1; MG/1; MG/1; MG/1; MG/1
CAPSULE ORAL
COMMUNITY
Start: 2024-02-23 | End: 2024-04-04

## 2024-04-04 RX ORDER — ALBUTEROL SULFATE 90 UG/1
2 AEROSOL, METERED RESPIRATORY (INHALATION) EVERY 6 HOURS PRN
COMMUNITY

## 2024-04-04 RX ORDER — ONDANSETRON 4 MG/1
4 TABLET, FILM COATED ORAL EVERY 8 HOURS PRN
COMMUNITY
Start: 2023-12-09 | End: 2024-04-04

## 2024-04-05 ENCOUNTER — TELEPHONE (OUTPATIENT)
Dept: GYNECOLOGY | Age: 34
End: 2024-04-05

## 2024-04-05 ENCOUNTER — TELEPHONE (OUTPATIENT)
Dept: SURGERY | Age: 34
End: 2024-04-05

## 2024-04-05 NOTE — TELEPHONE ENCOUNTER
Patient is requesting doctor note sent through [employer] for past procedure    Patient can be reached at  [phone]

## 2024-04-05 NOTE — TELEPHONE ENCOUNTER
Spoke with patient to confirm surgery on 4/8. Arrival time 10:15 am, npo after midnight, need  18 or older.

## 2024-04-06 ENCOUNTER — ANESTHESIA EVENT (OUTPATIENT)
Dept: OPERATING ROOM | Age: 34
End: 2024-04-06
Payer: COMMERCIAL

## 2024-04-08 ENCOUNTER — ANESTHESIA (OUTPATIENT)
Dept: OPERATING ROOM | Age: 34
End: 2024-04-08
Payer: COMMERCIAL

## 2024-04-08 ENCOUNTER — HOSPITAL ENCOUNTER (OUTPATIENT)
Age: 34
Setting detail: OUTPATIENT SURGERY
Discharge: HOME | End: 2024-04-08
Attending: SURGERY | Admitting: SURGERY
Payer: COMMERCIAL

## 2024-04-08 VITALS
OXYGEN SATURATION: 100 % | HEART RATE: 58 BPM | SYSTOLIC BLOOD PRESSURE: 117 MMHG | RESPIRATION RATE: 16 BRPM | DIASTOLIC BLOOD PRESSURE: 98 MMHG | TEMPERATURE: 97 F | BODY MASS INDEX: 36.88 KG/M2 | WEIGHT: 249 LBS | HEIGHT: 69 IN

## 2024-04-08 DIAGNOSIS — K60.2 ANAL FISSURE: Primary | ICD-10-CM

## 2024-04-08 LAB — HCG UR QL: POSITIVE

## 2024-04-08 PROCEDURE — 2580000003 HC RX 258: Performed by: NURSE ANESTHETIST, CERTIFIED REGISTERED

## 2024-04-08 PROCEDURE — 6360000002 HC RX W HCPCS: Performed by: NURSE ANESTHETIST, CERTIFIED REGISTERED

## 2024-04-08 PROCEDURE — 7100000001 HC PACU RECOVERY - ADDTL 15 MIN: Performed by: SURGERY

## 2024-04-08 PROCEDURE — 6370000000 HC RX 637 (ALT 250 FOR IP): Performed by: SURGERY

## 2024-04-08 PROCEDURE — 7100000010 HC PHASE II RECOVERY - FIRST 15 MIN: Performed by: SURGERY

## 2024-04-08 PROCEDURE — 2500000003 HC RX 250 WO HCPCS: Performed by: NURSE ANESTHETIST, CERTIFIED REGISTERED

## 2024-04-08 PROCEDURE — 2580000003 HC RX 258: Performed by: ANESTHESIOLOGY

## 2024-04-08 PROCEDURE — 6360000002 HC RX W HCPCS: Performed by: SURGERY

## 2024-04-08 PROCEDURE — 7100000011 HC PHASE II RECOVERY - ADDTL 15 MIN: Performed by: SURGERY

## 2024-04-08 PROCEDURE — 7100000000 HC PACU RECOVERY - FIRST 15 MIN: Performed by: SURGERY

## 2024-04-08 PROCEDURE — 3600000013 HC SURGERY LEVEL 3 ADDTL 15MIN: Performed by: SURGERY

## 2024-04-08 PROCEDURE — C9290 INJ, BUPIVACAINE LIPOSOME: HCPCS | Performed by: SURGERY

## 2024-04-08 PROCEDURE — 84703 CHORIONIC GONADOTROPIN ASSAY: CPT

## 2024-04-08 PROCEDURE — 6360000002 HC RX W HCPCS: Performed by: ANESTHESIOLOGY

## 2024-04-08 PROCEDURE — 3700000000 HC ANESTHESIA ATTENDED CARE: Performed by: SURGERY

## 2024-04-08 PROCEDURE — 3600000003 HC SURGERY LEVEL 3 BASE: Performed by: SURGERY

## 2024-04-08 PROCEDURE — 3700000001 HC ADD 15 MINUTES (ANESTHESIA): Performed by: SURGERY

## 2024-04-08 PROCEDURE — 2709999900 HC NON-CHARGEABLE SUPPLY: Performed by: SURGERY

## 2024-04-08 RX ORDER — SODIUM CHLORIDE 9 MG/ML
INJECTION, SOLUTION INTRAVENOUS PRN
Status: DISCONTINUED | OUTPATIENT
Start: 2024-04-08 | End: 2024-04-08 | Stop reason: HOSPADM

## 2024-04-08 RX ORDER — IPRATROPIUM BROMIDE AND ALBUTEROL SULFATE 2.5; .5 MG/3ML; MG/3ML
1 SOLUTION RESPIRATORY (INHALATION)
Status: DISCONTINUED | OUTPATIENT
Start: 2024-04-08 | End: 2024-04-09 | Stop reason: HOSPADM

## 2024-04-08 RX ORDER — ONDANSETRON 2 MG/ML
INJECTION INTRAMUSCULAR; INTRAVENOUS PRN
Status: DISCONTINUED | OUTPATIENT
Start: 2024-04-08 | End: 2024-04-08 | Stop reason: SDUPTHER

## 2024-04-08 RX ORDER — NALOXONE HYDROCHLORIDE 0.4 MG/ML
INJECTION, SOLUTION INTRAMUSCULAR; INTRAVENOUS; SUBCUTANEOUS PRN
Status: DISCONTINUED | OUTPATIENT
Start: 2024-04-08 | End: 2024-04-09 | Stop reason: HOSPADM

## 2024-04-08 RX ORDER — ACETAMINOPHEN 325 MG/1
650 TABLET ORAL
Status: DISCONTINUED | OUTPATIENT
Start: 2024-04-08 | End: 2024-04-09 | Stop reason: HOSPADM

## 2024-04-08 RX ORDER — FENTANYL CITRATE 50 UG/ML
INJECTION, SOLUTION INTRAMUSCULAR; INTRAVENOUS PRN
Status: DISCONTINUED | OUTPATIENT
Start: 2024-04-08 | End: 2024-04-08 | Stop reason: SDUPTHER

## 2024-04-08 RX ORDER — DOCUSATE SODIUM 100 MG/1
100 CAPSULE, LIQUID FILLED ORAL 2 TIMES DAILY
Qty: 30 CAPSULE | Refills: 0 | Status: SHIPPED | OUTPATIENT
Start: 2024-04-08

## 2024-04-08 RX ORDER — PROPOFOL 10 MG/ML
INJECTION, EMULSION INTRAVENOUS PRN
Status: DISCONTINUED | OUTPATIENT
Start: 2024-04-08 | End: 2024-04-08 | Stop reason: SDUPTHER

## 2024-04-08 RX ORDER — PROCHLORPERAZINE EDISYLATE 5 MG/ML
5 INJECTION INTRAMUSCULAR; INTRAVENOUS
Status: DISCONTINUED | OUTPATIENT
Start: 2024-04-08 | End: 2024-04-09 | Stop reason: HOSPADM

## 2024-04-08 RX ORDER — OXYCODONE HYDROCHLORIDE 5 MG/1
5 TABLET ORAL PRN
Status: DISCONTINUED | OUTPATIENT
Start: 2024-04-08 | End: 2024-04-08 | Stop reason: HOSPADM

## 2024-04-08 RX ORDER — GLYCOPYRROLATE 0.2 MG/ML
INJECTION INTRAMUSCULAR; INTRAVENOUS PRN
Status: DISCONTINUED | OUTPATIENT
Start: 2024-04-08 | End: 2024-04-08 | Stop reason: SDUPTHER

## 2024-04-08 RX ORDER — SODIUM CHLORIDE 0.9 % (FLUSH) 0.9 %
5-40 SYRINGE (ML) INJECTION EVERY 12 HOURS SCHEDULED
Status: DISCONTINUED | OUTPATIENT
Start: 2024-04-08 | End: 2024-04-08 | Stop reason: HOSPADM

## 2024-04-08 RX ORDER — FENTANYL CITRATE 50 UG/ML
25 INJECTION, SOLUTION INTRAMUSCULAR; INTRAVENOUS EVERY 5 MIN PRN
Status: DISCONTINUED | OUTPATIENT
Start: 2024-04-08 | End: 2024-04-09 | Stop reason: HOSPADM

## 2024-04-08 RX ORDER — HYDROMORPHONE HYDROCHLORIDE 1 MG/ML
0.5 INJECTION, SOLUTION INTRAMUSCULAR; INTRAVENOUS; SUBCUTANEOUS EVERY 5 MIN PRN
Status: COMPLETED | OUTPATIENT
Start: 2024-04-08 | End: 2024-04-08

## 2024-04-08 RX ORDER — HYDRALAZINE HYDROCHLORIDE 20 MG/ML
10 INJECTION INTRAMUSCULAR; INTRAVENOUS
Status: DISCONTINUED | OUTPATIENT
Start: 2024-04-08 | End: 2024-04-09 | Stop reason: HOSPADM

## 2024-04-08 RX ORDER — ACETAMINOPHEN 325 MG/1
1000 TABLET ORAL ONCE
Status: COMPLETED | OUTPATIENT
Start: 2024-04-08 | End: 2024-04-08

## 2024-04-08 RX ORDER — LIDOCAINE HYDROCHLORIDE 20 MG/ML
INJECTION, SOLUTION INTRAVENOUS PRN
Status: DISCONTINUED | OUTPATIENT
Start: 2024-04-08 | End: 2024-04-08 | Stop reason: SDUPTHER

## 2024-04-08 RX ORDER — LABETALOL HYDROCHLORIDE 5 MG/ML
10 INJECTION, SOLUTION INTRAVENOUS
Status: DISCONTINUED | OUTPATIENT
Start: 2024-04-08 | End: 2024-04-09 | Stop reason: HOSPADM

## 2024-04-08 RX ORDER — SODIUM CHLORIDE 0.9 % (FLUSH) 0.9 %
5-40 SYRINGE (ML) INJECTION PRN
Status: DISCONTINUED | OUTPATIENT
Start: 2024-04-08 | End: 2024-04-08 | Stop reason: HOSPADM

## 2024-04-08 RX ORDER — ONDANSETRON 2 MG/ML
4 INJECTION INTRAMUSCULAR; INTRAVENOUS
Status: DISCONTINUED | OUTPATIENT
Start: 2024-04-08 | End: 2024-04-09 | Stop reason: HOSPADM

## 2024-04-08 RX ORDER — OXYCODONE HYDROCHLORIDE 5 MG/1
10 TABLET ORAL PRN
Status: DISCONTINUED | OUTPATIENT
Start: 2024-04-08 | End: 2024-04-08 | Stop reason: HOSPADM

## 2024-04-08 RX ORDER — OXYCODONE HYDROCHLORIDE AND ACETAMINOPHEN 5; 325 MG/1; MG/1
1 TABLET ORAL EVERY 6 HOURS PRN
Qty: 12 TABLET | Refills: 0 | Status: SHIPPED | OUTPATIENT
Start: 2024-04-08 | End: 2024-04-11

## 2024-04-08 RX ORDER — MIDAZOLAM HYDROCHLORIDE 1 MG/ML
INJECTION INTRAMUSCULAR; INTRAVENOUS PRN
Status: DISCONTINUED | OUTPATIENT
Start: 2024-04-08 | End: 2024-04-08 | Stop reason: SDUPTHER

## 2024-04-08 RX ORDER — SODIUM CHLORIDE, SODIUM LACTATE, POTASSIUM CHLORIDE, CALCIUM CHLORIDE 600; 310; 30; 20 MG/100ML; MG/100ML; MG/100ML; MG/100ML
INJECTION, SOLUTION INTRAVENOUS CONTINUOUS
Status: DISCONTINUED | OUTPATIENT
Start: 2024-04-08 | End: 2024-04-08 | Stop reason: HOSPADM

## 2024-04-08 RX ORDER — MEPERIDINE HYDROCHLORIDE 25 MG/ML
12.5 INJECTION INTRAMUSCULAR; INTRAVENOUS; SUBCUTANEOUS EVERY 5 MIN PRN
Status: DISCONTINUED | OUTPATIENT
Start: 2024-04-08 | End: 2024-04-09 | Stop reason: HOSPADM

## 2024-04-08 RX ORDER — SODIUM CHLORIDE 0.9 % (FLUSH) 0.9 %
5-40 SYRINGE (ML) INJECTION EVERY 12 HOURS SCHEDULED
Status: DISCONTINUED | OUTPATIENT
Start: 2024-04-08 | End: 2024-04-09 | Stop reason: HOSPADM

## 2024-04-08 RX ORDER — LIDOCAINE HYDROCHLORIDE 10 MG/ML
1 INJECTION, SOLUTION EPIDURAL; INFILTRATION; INTRACAUDAL; PERINEURAL
Status: DISCONTINUED | OUTPATIENT
Start: 2024-04-08 | End: 2024-04-08 | Stop reason: HOSPADM

## 2024-04-08 RX ORDER — SODIUM CHLORIDE 9 MG/ML
INJECTION, SOLUTION INTRAVENOUS PRN
Status: DISCONTINUED | OUTPATIENT
Start: 2024-04-08 | End: 2024-04-09 | Stop reason: HOSPADM

## 2024-04-08 RX ORDER — SODIUM CHLORIDE 0.9 % (FLUSH) 0.9 %
5-40 SYRINGE (ML) INJECTION PRN
Status: DISCONTINUED | OUTPATIENT
Start: 2024-04-08 | End: 2024-04-09 | Stop reason: HOSPADM

## 2024-04-08 RX ADMIN — LIDOCAINE HYDROCHLORIDE 100 MG: 20 INJECTION, SOLUTION INTRAVENOUS at 11:30

## 2024-04-08 RX ADMIN — FENTANYL CITRATE 50 MCG: 50 INJECTION, SOLUTION INTRAMUSCULAR; INTRAVENOUS at 11:50

## 2024-04-08 RX ADMIN — DEXMEDETOMIDINE HYDROCHLORIDE 6 MCG: 100 INJECTION, SOLUTION INTRAVENOUS at 11:56

## 2024-04-08 RX ADMIN — PROPOFOL 200 MG: 10 INJECTION, EMULSION INTRAVENOUS at 11:30

## 2024-04-08 RX ADMIN — MIDAZOLAM HYDROCHLORIDE 2 MG: 2 INJECTION, SOLUTION INTRAMUSCULAR; INTRAVENOUS at 11:20

## 2024-04-08 RX ADMIN — HYDROMORPHONE HYDROCHLORIDE 0.5 MG: 1 INJECTION, SOLUTION INTRAMUSCULAR; INTRAVENOUS; SUBCUTANEOUS at 12:29

## 2024-04-08 RX ADMIN — ACETAMINOPHEN 975 MG: 325 TABLET ORAL at 11:16

## 2024-04-08 RX ADMIN — FENTANYL CITRATE 50 MCG: 50 INJECTION, SOLUTION INTRAMUSCULAR; INTRAVENOUS at 11:36

## 2024-04-08 RX ADMIN — DEXMEDETOMIDINE HYDROCHLORIDE 8 MCG: 100 INJECTION, SOLUTION INTRAVENOUS at 11:33

## 2024-04-08 RX ADMIN — FENTANYL CITRATE 50 MCG: 50 INJECTION, SOLUTION INTRAMUSCULAR; INTRAVENOUS at 11:29

## 2024-04-08 RX ADMIN — ONDANSETRON 4 MG: 2 INJECTION INTRAMUSCULAR; INTRAVENOUS at 11:57

## 2024-04-08 RX ADMIN — HYDROMORPHONE HYDROCHLORIDE 0.5 MG: 1 INJECTION, SOLUTION INTRAMUSCULAR; INTRAVENOUS; SUBCUTANEOUS at 12:44

## 2024-04-08 RX ADMIN — DEXMEDETOMIDINE HYDROCHLORIDE 6 MCG: 100 INJECTION, SOLUTION INTRAVENOUS at 11:42

## 2024-04-08 RX ADMIN — SODIUM CHLORIDE, POTASSIUM CHLORIDE, SODIUM LACTATE AND CALCIUM CHLORIDE: 600; 310; 30; 20 INJECTION, SOLUTION INTRAVENOUS at 11:15

## 2024-04-08 RX ADMIN — GLYCOPYRROLATE 0.2 MG: 0.2 INJECTION INTRAMUSCULAR; INTRAVENOUS at 11:37

## 2024-04-08 ASSESSMENT — PAIN SCALES - GENERAL
PAINLEVEL_OUTOF10: 7
PAINLEVEL_OUTOF10: 8
PAINLEVEL_OUTOF10: 7
PAINLEVEL_OUTOF10: 3

## 2024-04-08 ASSESSMENT — PAIN DESCRIPTION - PAIN TYPE
TYPE: SURGICAL PAIN
TYPE: SURGICAL PAIN

## 2024-04-08 ASSESSMENT — PAIN - FUNCTIONAL ASSESSMENT: PAIN_FUNCTIONAL_ASSESSMENT: 0-10

## 2024-04-08 ASSESSMENT — PAIN DESCRIPTION - FREQUENCY
FREQUENCY: CONTINUOUS
FREQUENCY: CONTINUOUS

## 2024-04-08 ASSESSMENT — PAIN DESCRIPTION - ORIENTATION
ORIENTATION: LOWER

## 2024-04-08 ASSESSMENT — PAIN DESCRIPTION - ONSET
ONSET: ON-GOING
ONSET: ON-GOING

## 2024-04-08 ASSESSMENT — PAIN DESCRIPTION - LOCATION
LOCATION: BUTTOCKS
LOCATION: ABDOMEN
LOCATION: BUTTOCKS

## 2024-04-08 ASSESSMENT — PAIN DESCRIPTION - DESCRIPTORS
DESCRIPTORS: CRAMPING
DESCRIPTORS: BURNING
DESCRIPTORS: BURNING

## 2024-04-08 NOTE — ANESTHESIA PRE PROCEDURE
mass index is 36.77 kg/m².    CBC:   Lab Results   Component Value Date/Time    WBC 9.2 04/01/2024 06:55 AM    RBC 5.25 04/01/2024 06:55 AM    HGB 15.3 04/01/2024 06:55 AM    HCT 45.5 04/01/2024 06:55 AM    MCV 86.6 04/01/2024 06:55 AM    RDW 17.3 04/01/2024 06:55 AM     04/01/2024 06:55 AM       CMP:   Lab Results   Component Value Date/Time     04/01/2024 06:55 AM    K 5.7 04/01/2024 06:55 AM     04/01/2024 06:55 AM    CO2 23 04/01/2024 06:55 AM    BUN 5 04/01/2024 06:55 AM    CREATININE 0.5 04/01/2024 06:55 AM    GFRAA >60 09/16/2021 05:21 AM    GFRAA >60 04/19/2013 08:20 AM    AGRATIO 1.0 04/01/2024 06:55 AM    LABGLOM >90 04/01/2024 06:55 AM    GLUCOSE 102 04/01/2024 06:55 AM    PROT 7.5 04/01/2024 06:55 AM    PROT 6.7 04/12/2011 12:20 PM    CALCIUM 9.0 04/01/2024 06:55 AM    BILITOT 0.6 04/01/2024 06:55 AM    ALKPHOS 54 04/01/2024 06:55 AM    AST 38 04/01/2024 06:55 AM    ALT 20 04/01/2024 06:55 AM       POC Tests: No results for input(s): \"POCGLU\", \"POCNA\", \"POCK\", \"POCCL\", \"POCBUN\", \"POCHEMO\", \"POCHCT\" in the last 72 hours.    Coags: No results found for: \"PROTIME\", \"INR\", \"APTT\"    HCG (If Applicable):   Lab Results   Component Value Date    PREGTESTUR Positive 04/08/2024        ABGs: No results found for: \"PHART\", \"PO2ART\", \"ZMY4ROJ\", \"JZW7CMM\", \"BEART\", \"B0IRPCSG\"     Type & Screen (If Applicable):  Lab Results   Component Value Date    LABABO O 04/19/2013    LABRH Positive 04/19/2013       Drug/Infectious Status (If Applicable):  No results found for: \"HIV\", \"HEPCAB\"    COVID-19 Screening (If Applicable):   Lab Results   Component Value Date/Time    COVID19 Not Detected 05/04/2021 06:32 PM           Anesthesia Evaluation  Patient summary reviewed and Nursing notes reviewed  Airway: Mallampati: II  TM distance: >3 FB   Neck ROM: full  Mouth opening: > = 3 FB   Dental: normal exam         Pulmonary: breath sounds clear to auscultation  (+)     sleep apnea:       asthma:

## 2024-04-08 NOTE — OP NOTE
OPERATIVE NOTE     Lory Cr  1990  [id number]    DATE OF PROCEDURE: 04/08/24     PREOPERATIVE DIAGNOSES: Chronic anal fissure     POSTOPERATIVE DIAGNOSES: Chronic anal fissure     PROCEDURE:  Chemodenervation of internal sphincter with botulinum toxin for anal fissure     SURGEON: Samy Cole MD     ANESTHESIA: GET     COMPLICATIONS: None.     EBL: 5 cc    SPECIMEN: Rectal biopsy/anal fissure     FINDINGS:  posterior midline very mild  anal fissure with hypertonic sphicnter     INDICATIONS: The patient is a 34-year-old female who has had symptoms of chronic anal fissure unresponsive to medical management. Patient was recommended to undergo botox denervation of internal sphincter, fissurectomy, possible biopsy, flexible sigmoidoscopy, and other associated procedures as needed.    The risks, benefits, and alternatives of procedure were explained to the patient including risks of bleeding, infection, pelvic sepsis, urinary retention, anal stenosis, recurrent fissure, and potential  sphincter damage and dysfunction, which can result in temporary or rarely permanent incontinence. Patient understood all of these risks and agreed to  proceed. Typical postoperative course was discussed, including pain and likely need for up to 3 weeks of recovery.     PROCEDURE DETAILS:   The patient was brought to the operating theater. The patient was placed in the prone jack-knife position after induction of general endotracheal anesthesia. Buttocks were taped apart carefully using tape. The patient's perianal region was prepped and draped in usual sterile fashion using Betadine prep solution. Time-out was performed confirming the patient's identity as well as the operative site. Antibiotics were not indicated. SCDs were on and functioning. All safety points were followed.      Digital rectal exam and anoscopy was performed in all 4 quadrants using lighted anal retractor with findings noted as above.    100 units total of

## 2024-04-08 NOTE — H&P
completed weeks of gestation     At 6 wks  on 4/1/24    Moderate episode of recurrent major depressive disorder (HCC) 07/19/2018    Morbid obesity with BMI of 50.0-59.9, adult ([facility])     Unspecified vitamin D deficiency 04/13/2011    Vertigo      Past Surgical History:   Procedure Laterality Date    DILATION AND CURETTAGE OF UTERUS  04/19/2013    DILATION AND CURETTAGE OF UTERUS N/A 04/01/2024    DILATATION AND CURETTAGE SUCTION performed by Noreen Rosario MD at [facility] OR    ENDOSCOPY, COLON, DIAGNOSTIC      Pt denies colonoscopy (9/14/21)    OTHER SURGICAL HISTORY  12/2023    REDO OF GASTRIC SLEEVE    SLEEVE GASTRECTOMY N/A 09/14/2021    ROBOTIC ASSISTED LAPAROSCOPIC SLEEVE GASTRECTOMY performed by Yovani Landon DO at [facility] OR    UPPER GASTROINTESTINAL ENDOSCOPY N/A 05/10/2021    EGD BIOPSY performed by Yovani Landon DO at [facility] ENDOSCOPY         Physical Exam:     BP (!) 122/90   Pulse 78   Temp 98.5 °F (36.9 °C) (Tympanic)   Resp 16   Ht 1.753 m (5' 9\")   Wt 112.9 kg (249 lb)   LMP 01/22/2024 (Approximate) Comment: Had Missed AB D&C with Dr Ayala on 4/1/24  SpO2 98%   Breastfeeding Unknown   BMI 36.77 kg/m²  Body mass index is 36.77 kg/m².  Constitutional: Appears well-developed and well-nourished.  Head: Normocephalic, atraumatic.   Eyes: No scleral icterus. Vision intact grossly.  ENT: Hearing grossly intact. No facial deformity.  Neck: Normal range of motion. No tracheal deviation.   Cardiovascular: Normal rate.  No peripheral edema.  Pulmonary/Chest: Effort normal. No respiratory distress. No wheezes. No use of accessory muscles.  Musculoskeletal: No gross deformity.   Neurological: Alert and oriented to person, place, and time. No gross deficits.  Skin: Skin is dry. No rash noted. No pallor.   Psychiatric: Normal mood and affect. Behavior normal. Oriented to person, place, and time.  Abdomen: soft, NTTP, non distended    Recent labs and imaging reviewed as necessary.    Assessment/Plan:       Proceed as

## 2024-04-08 NOTE — PROGRESS NOTES
Ambulatory Surgery/Procedure Discharge Note    Vitals:    04/08/24 1410   BP: (!) 117/98   Pulse: 58   Resp: 16   Temp: 97 °F (36.1 °C)   SpO2: 100%       In: 560 [P.O.:60; I.V.:500]  Out: 0     BP within 20% pre-op ania score.    Restroom use offered before discharge.  Yes    Pain assessment:  present - adequately treated  Pain Level: 3    Discharge order obtained, and discharge instructions reviewed. Patient educated, using the teach back method, about follow up instructions and discharge instructions. A completed copy of the AVS instructions given to patient and all questions answered. IV catheter removed without complaints, catheter intact, site WNL.     Patient discharged to home/self care. Patient discharged via wheel chair by transporter to waiting family/S.O.       4/8/2024 2:23 PM   
PACU Transfer to [facility]    Vitals:    04/08/24 1400   BP: 129/80   Pulse: 61   Resp: 16   Temp: 97.1 °F (36.2 °C)   SpO2: 100%         Intake/Output Summary (Last 24 hours) at 4/8/2024 1407  Last data filed at 4/8/2024 1407  Gross per 24 hour   Intake 560 ml   Output 0 ml   Net 560 ml       Pain assessment:  none  Pain Level: 3    Patient transferred to care of [facility] RN.    4/8/2024 2:07 PM    
Patient admitted to PACU # 11 from OR at 1211 post EXAM UNDER ANESTHESIA, BOTOX INJECTION ANAL FISSURE  per Dr. Cole.  Attached to PACU monitoring system and report received from anesthesia provider.  Patient was reported to be hemodynamically stable during procedure.  Pt arrived to pacu on 6 L and was decreased to 2 L. Pt NSR on monitor. IVF infusing. Pt has mesh panties in place. Pt drowsy. Will continue to monitor.   
Pt ready for procedure. Friend at bedside.  
you on the day of your procedure.    10. If you use oxygen at home, please bring your oxygen tank with you to hospital..     11. We recommend that valuable personal belongings such as cash, cell phones, e-tablets, or jewelry, be left at home during your stay. The hospital will not be responsible for valuables that are not secured in the hospital safe. However, if your insurance requires a co-pay, you may want to bring a method of payment, i.e., Check or credit card, if you wish to pay your co-pay the day of surgery.      12. If you are to stay overnight, you may bring a bag with personal items. Please have any large items you may need brought in by your family after your arrival to your hospital room.    13. If you have a Living Will or Durable Power of , please bring a copy on the day of your procedure.     How we keep you safe and work to prevent surgical site infections:   1. Health care workers should always check your ID bracelet to verify your name and birth date. You will be asked many times to state your name, date of birth, and allergies.    2. Health care workers should always clean their hands with soap or alcohol gel before providing care to you. It is okay to ask anyone if they cleaned their hands before they touch you.    3. You will be actively involved in verifying the type of procedure you are having and ensuring the correct surgical site. This will be confirmed multiple times prior to your procedure. Do NOT calixto your surgery site UNLESS instructed to by your surgeon.     4. When you are in the operating room, your surgical site will be cleansed with a special soap, and in most cases, you will be given an antibiotic before the surgery begins.      What to expect AFTER your procedure?  1. Immediately following your procedure, your will be taken to the PACU for the first phase of your recovery.  Your nurse will help you recover from any potential side effects of anesthesia, such as extreme

## 2024-04-08 NOTE — BRIEF OP NOTE
Brief Postoperative Note      Patient: Lory Cr  YOB: 1990  MRN: [id number]    Date of Procedure: 4/8/2024    Pre-Op Diagnosis Codes:     * Anal fissure [K60.2]    Post-Op Diagnosis: Same       Procedure(s):  EXAM UNDER ANESTHESIA, BOTOX INJECTION ANAL FISSURE    Surgeon(s):  Samy Cole MD    Assistant:  Resident: Gabriella Guillen MD    Anesthesia: General    Estimated Blood Loss (mL): Minimal    Complications: None    Specimens:   * No specimens in log *    Implants:  * No implants in log *      Drains: * No LDAs found *    Findings:  Infection Present At Time Of Surgery (PATOS) (choose all levels that have infection present):      Electronically signed by Gabriella Guillen MD on 4/8/2024 at 12:01 PM

## 2024-04-08 NOTE — DISCHARGE INSTRUCTIONS
8:30am-5:00pm). After hours, you may call the surgeon on call or come to the nearest ER. You should watch for excessive bleeding, fever, chills, nausea, vomiting, severe increase in pain, inability to urinate, or foul smelling or painful drainage.    Office number: [phone]      [facility] AMBULATORY PROCEDURE DISCHARGE INSTRUCTIONS    There are potential side effects of anesthesia or sedation you may experience for the first 24 hours.  These side effects include:    Confusion or Memory loss, Dizziness, or Delayed Reaction Times   [x]A responsible person should be with you for the next 24 hours.  Do not operate any vehicles (automobiles, bicycles, motorcycles) or power tools or machinery for 24 hours.  Do not sign any legal documents or make any legal decisions for 24 hours. Do not drink alcohol for 24 hours or while taking narcotic pain medication.      Nausea    [x]Start with light diet and progress to your normal diet as you feel like eating. However, if you experience nausea or repeated episodes of vomiting which persist beyond 12-24 hours, notify your physician.  Once nausea has passed, remember to keep drinking fluids.    Difficulty Passing Urine  [x]Drink extra amounts of fluid today.  Notify your physician if you have not urinated within 8 hours after your procedure or you feel uncomfortable.      Irritated Throat from a Breathing Tube  [x]Drink extra amounts of fluid today.  Lozenges may help.    Muscle Aches  [x]You may experience some generalized body aches as your muscles recover from medications used to relax them during surgery.  These will gradually subside.    MEDICATION INSTRUCTIONS:  [x]Prescription(S) x   1  sent with you.  Use as directed.  When taking pain medications, you may experience the side effect of dizziness or drowsiness.  Do not drink alcohol or drive when taking these medications.  [x]Prescription(S) x 1  Called to Pharmacy Name and location: [facility]/pharmacy #[id number] -

## 2024-04-08 NOTE — ANESTHESIA POSTPROCEDURE EVALUATION
Department of Anesthesiology  Postprocedure Note    Patient: Lory Cr  MRN: [id number]  YOB: 1990  Date of evaluation: 4/8/2024    Procedure Summary       Date: 04/08/24 Room / Location: [address]    Anesthesia Start: 1120 Anesthesia Stop: 1215    Procedure: EXAM UNDER ANESTHESIA, BOTOX INJECTION ANAL FISSURE Diagnosis:       Anal fissure      (Anal fissure [K60.2])    Surgeons: Samy Cole MD Responsible Provider: Jonathan Vicente DO    Anesthesia Type: general ASA Status: 3            Anesthesia Type: No value filed.    Leonardo Phase I: Leonardo Score: 7    Leonardo Phase II:      Anesthesia Post Evaluation    Patient location during evaluation: PACU  Patient participation: complete - patient participated  Level of consciousness: awake  Pain score: 2  Airway patency: patent  Nausea & Vomiting: no nausea and no vomiting  Cardiovascular status: hemodynamically stable  Respiratory status: acceptable  Hydration status: stable  Multimodal analgesia pain management approach  Pain management: adequate    No notable events documented.

## 2024-04-17 ENCOUNTER — OFFICE VISIT (OUTPATIENT)
Dept: GYNECOLOGY | Age: 34
End: 2024-04-17
Payer: COMMERCIAL

## 2024-04-17 VITALS
HEART RATE: 89 BPM | RESPIRATION RATE: 17 BRPM | BODY MASS INDEX: 42.65 KG/M2 | OXYGEN SATURATION: 99 % | DIASTOLIC BLOOD PRESSURE: 76 MMHG | WEIGHT: 288.8 LBS | SYSTOLIC BLOOD PRESSURE: 124 MMHG

## 2024-04-17 DIAGNOSIS — D21.9 FIBROIDS: Primary | ICD-10-CM

## 2024-04-17 DIAGNOSIS — R39.89 SENSATION OF PRESSURE IN BLADDER AREA: ICD-10-CM

## 2024-04-17 PROCEDURE — 99213 OFFICE O/P EST LOW 20 MIN: CPT | Performed by: OBSTETRICS & GYNECOLOGY

## 2024-04-20 NOTE — PROGRESS NOTES
Patient is here for post op visit. Patient with some urinary pressure still  /76 (Site: Right Lower Arm, Position: Sitting, Cuff Size: Large Adult)   Pulse 89   Resp 17   Wt 131 kg (288 lb 12.8 oz)   LMP 01/22/2024 (Approximate) Comment: Had Missed AB D&C with Dr Ayala on 4/1/24  SpO2 99%   BMI 42.65 kg/m²     WDWN in NAD  A and O x 3  ABD-soft, NT, ND, incision cdi  EXT-no c/c/e, no cords, NT    Plan-s/p suction d and c  -healing well  Urinary symptoms-referral back to Dr. Paz  -hx fibroid-pelvic Us

## 2024-04-21 ENCOUNTER — PATIENT MESSAGE (OUTPATIENT)
Dept: SURGERY | Age: 34
End: 2024-04-21

## 2024-04-21 DIAGNOSIS — K60.2 ANAL FISSURE: Primary | ICD-10-CM

## 2024-04-22 ENCOUNTER — TELEPHONE (OUTPATIENT)
Dept: SURGERY | Age: 34
End: 2024-04-22

## 2024-04-22 RX ORDER — OXYCODONE HYDROCHLORIDE 5 MG/1
5 TABLET ORAL EVERY 6 HOURS PRN
Qty: 28 TABLET | Refills: 0 | Status: SHIPPED | OUTPATIENT
Start: 2024-04-22 | End: 2024-04-29

## 2024-04-22 NOTE — TELEPHONE ENCOUNTER
From: Lory Cr  To: Dr. Samy Cole  Sent: 4/21/2024 11:54 AM EDT  Subject: Work    Hello…  I am still in a pain and discomfort. Is there anyway I could have something for pain. I’ve been taking ibuprofen 600 mg. Also, instead of returning to work on 4/22, can I return on 4/29 please and have an updated note.   I feel like the pain is getting better but I need more time.

## 2024-04-22 NOTE — TELEPHONE ENCOUNTER
Spoke to patient informing her pain medication has been sent to her pharmacy. Work letter emailed to [email or web address].

## 2024-04-22 NOTE — TELEPHONE ENCOUNTER
Patient calling post EXAM UNDER ANESTHESIA, FISSURECTOMY, BOTOX INJECTION ANAL FISSURE [[id number]] completed 04/08/2024. Patient states she is currently experiencing a great deal of pain. Patient states the return to work date given was 04/23/2024 and she would like to know if that can be extended an additional week.     Patient also states the Ibuprofen 600 and cream are not helping with pain. Patient requests additional medication for pain. Patient's pharmacy is [facility]/pharmacy #[id number] - [place], [place] - [address] -  [phone] - F [phone].    Patient can be reached at [phone].

## 2024-04-26 ENCOUNTER — TELEPHONE (OUTPATIENT)
Dept: SURGERY | Age: 34
End: 2024-04-26

## 2024-04-26 DIAGNOSIS — K60.2 ANAL FISSURE: Primary | ICD-10-CM

## 2024-04-26 NOTE — TELEPHONE ENCOUNTER
Patient called in stating that her employer needs additional information as to why she is off of work and what her restrictions are     Patient submitted a form via [employer] to fill out     Please contact the patient at [phone]

## 2024-05-03 DIAGNOSIS — K60.2 ANAL FISSURE: Primary | ICD-10-CM

## 2024-05-03 RX ORDER — OXYCODONE HYDROCHLORIDE 5 MG/1
5 TABLET ORAL EVERY 6 HOURS PRN
Qty: 28 TABLET | Refills: 0 | Status: SHIPPED | OUTPATIENT
Start: 2024-05-03 | End: 2024-05-10

## 2024-05-03 NOTE — TELEPHONE ENCOUNTER
Patient called requesting pain medication refill. She states she will be out tomorrow.    Please follow up with patient [phone]

## 2024-05-06 RX ORDER — OXYCODONE HYDROCHLORIDE 5 MG/1
5 TABLET ORAL EVERY 8 HOURS PRN
Qty: 21 TABLET | Refills: 0 | Status: SHIPPED | OUTPATIENT
Start: 2024-05-06 | End: 2024-05-13

## 2024-05-06 NOTE — TELEPHONE ENCOUNTER
Patient states she spoke with the [facility]/pharmacy #[id number] - [place], [place] - [address]  - P [phone] - F [phone] and confirmed the pharmacy did not received a medication request for the oxyCODONE (ROXICODONE) 5 MG immediate release tablet.    Patient can be reached at [phone].

## 2024-05-06 NOTE — TELEPHONE ENCOUNTER
Spoke to patient, it appears that Oxy was refilled on 5/3. I instructed her to call the [facility] on [address] to see if they have her order ready. Office visit was rescheduled to tomorrow as patient feels that she needs to be seen sooner. Patient instructed to call the office if her pharmacy did not receive the order for oxy.

## 2024-05-06 NOTE — TELEPHONE ENCOUNTER
Patient called requesting refill again. She states she is in a lot of pain    She also states if she can't drive while taking pain medication she will need longer time off work    Please follow up with patient [phone]

## 2024-05-07 ENCOUNTER — TELEPHONE (OUTPATIENT)
Dept: SURGERY | Age: 34
End: 2024-05-07

## 2024-05-07 ENCOUNTER — OFFICE VISIT (OUTPATIENT)
Dept: SURGERY | Age: 34
End: 2024-05-07

## 2024-05-07 VITALS
WEIGHT: 284 LBS | SYSTOLIC BLOOD PRESSURE: 143 MMHG | BODY MASS INDEX: 41.94 KG/M2 | DIASTOLIC BLOOD PRESSURE: 93 MMHG | OXYGEN SATURATION: 99 % | TEMPERATURE: 97.8 F | HEART RATE: 92 BPM

## 2024-05-07 DIAGNOSIS — K60.2 ANAL FISSURE: Primary | ICD-10-CM

## 2024-05-07 PROCEDURE — 99024 POSTOP FOLLOW-UP VISIT: CPT | Performed by: SURGERY

## 2024-05-07 RX ORDER — OXYCODONE HYDROCHLORIDE 5 MG/1
5 TABLET ORAL EVERY 6 HOURS PRN
Qty: 28 TABLET | Refills: 0 | Status: SHIPPED | OUTPATIENT
Start: 2024-05-07 | End: 2024-05-14

## 2024-05-07 RX ORDER — OXYCODONE HYDROCHLORIDE AND ACETAMINOPHEN 5; 325 MG/1; MG/1
1 TABLET ORAL EVERY 6 HOURS PRN
Qty: 28 TABLET | Refills: 0 | Status: SHIPPED | OUTPATIENT
Start: 2024-05-07 | End: 2024-05-14

## 2024-05-07 NOTE — PROGRESS NOTES
additional testing or perform another Botox injection at this point.    DISPOSITION:  to see Alexis for second opinion    Note completed using dictation software, please excuse any errors.    Referring/primary care physician updated through Epic note if PCP was listed.    Electronically signed by Samy Cole MD on 5/7/2024 at 2:19 PM

## 2024-05-09 ENCOUNTER — OFFICE VISIT (OUTPATIENT)
Dept: SURGERY | Age: 34
End: 2024-05-09

## 2024-05-09 VITALS
HEART RATE: 84 BPM | OXYGEN SATURATION: 99 % | TEMPERATURE: 98.2 F | WEIGHT: 284 LBS | RESPIRATION RATE: 16 BRPM | SYSTOLIC BLOOD PRESSURE: 135 MMHG | DIASTOLIC BLOOD PRESSURE: 93 MMHG | BODY MASS INDEX: 41.94 KG/M2

## 2024-05-09 DIAGNOSIS — K59.4 RECTAL SPASM: Primary | ICD-10-CM

## 2024-05-09 PROCEDURE — 99024 POSTOP FOLLOW-UP VISIT: CPT | Performed by: SURGERY

## 2024-05-09 RX ORDER — DIAZEPAM 5 MG/1
5 TABLET ORAL EVERY 8 HOURS PRN
Qty: 21 TABLET | Refills: 0 | Status: SHIPPED | OUTPATIENT
Start: 2024-05-09 | End: 2024-05-16

## 2024-05-09 NOTE — PROGRESS NOTES
Subjective:     Patient is a 34 y.o. woman with rectal pain     HPI: Ms. Cr has a history of rectal pain. She had a weight loss operation in December which led to diarrhea and rectal pain starting in January. She was started on fissure cream with little relief in March and then got a Botox injection 1 month ago. She reports on a little relief from Botox.     Patient Active Problem List    Diagnosis Date Noted    Anal fissure 2024    Miscarriage 2024    Chronic GERD 2021    Obstructive sleep apnea     Insomnia 2018    Generalized anxiety disorder 2018    Vitamin D deficiency 2011    Obesity 2011     Past Medical History:   Diagnosis Date    Asthma     controlled    Generalized anxiety disorder     Migraines     Missed  with fetal demise before 20 completed weeks of gestation     At 6 wks  on 24    Moderate episode of recurrent major depressive disorder (HCC) 2018    Morbid obesity with BMI of 50.0-59.9, adult (HCC)     Unspecified vitamin D deficiency 2011    Vertigo       Past Surgical History:   Procedure Laterality Date    ANUS SURGERY N/A 2024    EXAM UNDER ANESTHESIA, BOTOX INJECTION ANAL FISSURE performed by Samy Cole MD at [facility] OR    DILATION AND CURETTAGE OF UTERUS  2013    DILATION AND CURETTAGE OF UTERUS N/A 2024    DILATATION AND CURETTAGE SUCTION performed by Noreen Rosario MD at [facility] OR    ENDOSCOPY, COLON, DIAGNOSTIC      Pt denies colonoscopy (21)    OTHER SURGICAL HISTORY  2023    REDO OF GASTRIC SLEEVE    SLEEVE GASTRECTOMY N/A 2021    ROBOTIC ASSISTED LAPAROSCOPIC SLEEVE GASTRECTOMY performed by Yovani Landon DO at [facility] OR    UPPER GASTROINTESTINAL ENDOSCOPY N/A 05/10/2021    EGD BIOPSY performed by Yovani Landon DO at [facility] ENDOSCOPY      Not in a hospital admission.  No Known Allergies   Social History     Tobacco Use    Smoking status: Never    Smokeless tobacco: Never   Substance Use Topics

## 2024-05-28 ENCOUNTER — TELEPHONE (OUTPATIENT)
Dept: SURGERY | Age: 34
End: 2024-05-28

## 2024-05-28 ENCOUNTER — OFFICE VISIT (OUTPATIENT)
Dept: SURGERY | Age: 34
End: 2024-05-28

## 2024-05-28 VITALS
HEART RATE: 105 BPM | DIASTOLIC BLOOD PRESSURE: 98 MMHG | OXYGEN SATURATION: 97 % | BODY MASS INDEX: 41.35 KG/M2 | WEIGHT: 280 LBS | SYSTOLIC BLOOD PRESSURE: 137 MMHG | TEMPERATURE: 98 F

## 2024-05-28 DIAGNOSIS — K59.4 RECTAL SPASM: Primary | ICD-10-CM

## 2024-05-28 DIAGNOSIS — K60.2 ANAL FISSURE: ICD-10-CM

## 2024-05-28 PROCEDURE — 99024 POSTOP FOLLOW-UP VISIT: CPT | Performed by: SURGERY

## 2024-05-28 RX ORDER — OXYCODONE HYDROCHLORIDE AND ACETAMINOPHEN 5; 325 MG/1; MG/1
1 TABLET ORAL EVERY 6 HOURS PRN
Qty: 28 TABLET | Refills: 0 | Status: SHIPPED | OUTPATIENT
Start: 2024-05-28 | End: 2024-06-04

## 2024-05-28 NOTE — PROGRESS NOTES
[facility] [place] [facility] COLORECTAL SURGERY  [address] [place] [address]  [address]  [place] [address]  Dept: [phone]  Dept Fax: [phone]  Loc: [phone]    Visit Date: 5/28/2024    Lory Cr is a 34 y.o. female who presents today for: Follow-up (Anal fissure)      Subjective:     Lory rC is a 34 y.o. female here for postoperative visit after Botox injection almost 2 months ago for mild anal fissure and spasm.    Still having spasm requiring pain medication.  Is scheduled to start pelvic floor physical therapy today.  Has tried Valium but had issues with incontinence.  Saw my partner Dr. Espinoza for second opinion a few weeks ago.    Patient's problem list, medications, past medical, surgical, family, and social histories were reviewed and updated in the chart as indicated today.    Objective:     BP (!) 137/98   Pulse (!) 105   Temp 98 °F (36.7 °C) (Infrared)   Wt 127 kg (280 lb)   SpO2 97%   BMI 41.35 kg/m²     Abdominal/wound: Still with tenderness in the perianal skin area, no evidence of abscess or fissure    Assessment/Plan:       ASSESSMENT/PLAN:    Still unfortunately having spasm type symptoms.  I am hopeful that pelvic floor physical therapy will help her improve her symptoms.  Otherwise, we may need to reach out to a pelvic floor pain specialist for other ideas.  She brought up the idea of revision of her duodenal switch, which also may be helpful to help with her GI issues.    Discussed with her that unfortunately there is no easy fix to her problem.    DISPOSITION:  f/u PRN    Note completed using dictation software, please excuse any errors.    Referring/primary care physician updated through [email or web address] note if PCP was listed.    Electronically signed by Saym Cole MD on 5/28/2024 at 1:12 PM

## 2024-06-10 ENCOUNTER — TELEPHONE (OUTPATIENT)
Dept: SURGERY | Age: 34
End: 2024-06-10

## 2024-06-10 NOTE — TELEPHONE ENCOUNTER
Patient called in stating that her short term disability paper work needs to be updated to state what prevented her from coming into work.    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    Pleas contact the patient once this has been updated and re faxed at [phone]

## 2024-06-18 ENCOUNTER — PATIENT MESSAGE (OUTPATIENT)
Dept: SURGERY | Age: 34
End: 2024-06-18

## 2024-06-19 NOTE — TELEPHONE ENCOUNTER
From: Lory Cr  To: Dr. Samy Cole  Sent: 6/18/2024 11:58 AM EDT  Subject: Colonoscopy     Good afternoon,  I am reaching out to see if you were still in agreement with me getting a colonoscopy? I am still in a lot of pain from the spasms and have also developed, what I believe are internal hemorrhoids. I was just thinking maybe we can take a look inside to see what’s happening. Thank you

## 2024-06-19 NOTE — TELEPHONE ENCOUNTER
Yes, Dr. Cole agrees you should still get a Colonoscopy. We typically refer patients to [facility] [phone]. Any issues with scheduling, please let us know.      Thanks

## 2024-07-02 ENCOUNTER — HOSPITAL ENCOUNTER (OUTPATIENT)
Dept: ULTRASOUND IMAGING | Age: 34
Discharge: HOME | End: 2024-07-02
Attending: OBSTETRICS & GYNECOLOGY
Payer: COMMERCIAL

## 2024-07-02 DIAGNOSIS — D21.9 FIBROIDS: ICD-10-CM

## 2024-07-02 PROCEDURE — 76830 TRANSVAGINAL US NON-OB: CPT

## 2024-07-02 PROCEDURE — 76856 US EXAM PELVIC COMPLETE: CPT

## 2024-07-05 ENCOUNTER — TELEPHONE (OUTPATIENT)
Dept: GYNECOLOGY | Age: 34
End: 2024-07-05

## 2024-07-05 NOTE — TELEPHONE ENCOUNTER
Nay from Radiology calling office to check if office received fax for patient     Call back number [phone]

## 2024-07-08 NOTE — TELEPHONE ENCOUNTER
Results Fax was sent on 7/3/24 to wrong number. Gave new fax number and it will be faxed oout today.

## 2024-07-10 ENCOUNTER — TELEPHONE (OUTPATIENT)
Dept: GYNECOLOGY | Age: 34
End: 2024-07-10

## 2024-07-10 NOTE — TELEPHONE ENCOUNTER
Called and spoke to patient about her results. She says that she only bleeds when she is on her menstrual cycle and that bleeding is heavy other than that she does not have any bleeding

## 2024-10-24 ENCOUNTER — OFFICE VISIT (OUTPATIENT)
Dept: GYNECOLOGY | Age: 34
End: 2024-10-24
Payer: COMMERCIAL

## 2024-10-24 VITALS
HEART RATE: 90 BPM | OXYGEN SATURATION: 97 % | HEIGHT: 69 IN | SYSTOLIC BLOOD PRESSURE: 122 MMHG | BODY MASS INDEX: 36.14 KG/M2 | WEIGHT: 244 LBS | DIASTOLIC BLOOD PRESSURE: 74 MMHG

## 2024-10-24 DIAGNOSIS — Z01.419 WELL WOMAN EXAM WITH ROUTINE GYNECOLOGICAL EXAM: Primary | ICD-10-CM

## 2024-10-24 DIAGNOSIS — N92.6 IRREGULAR MENSTRUAL BLEEDING: ICD-10-CM

## 2024-10-24 DIAGNOSIS — R10.2 PELVIC PAIN IN FEMALE: ICD-10-CM

## 2024-10-24 PROCEDURE — 99395 PREV VISIT EST AGE 18-39: CPT | Performed by: OBSTETRICS & GYNECOLOGY

## 2024-10-25 DIAGNOSIS — N92.6 IRREGULAR MENSTRUAL BLEEDING: ICD-10-CM

## 2024-10-25 DIAGNOSIS — R10.2 PELVIC PAIN IN FEMALE: ICD-10-CM

## 2024-10-26 LAB
ALBUMIN SERPL-MCNC: 4 G/DL (ref 3.4–5)
ALBUMIN/GLOB SERPL: 1.5 {RATIO} (ref 1.1–2.2)
ALP SERPL-CCNC: 84 U/L (ref 40–129)
ALT SERPL-CCNC: 21 U/L (ref 10–40)
ANION GAP SERPL CALCULATED.3IONS-SCNC: 14 MMOL/L (ref 3–16)
AST SERPL-CCNC: 19 U/L (ref 15–37)
B-HCG SERPL EIA 3RD IS-ACNC: <5 MIU/ML
BILIRUB SERPL-MCNC: 0.4 MG/DL (ref 0–1)
BUN SERPL-MCNC: 9 MG/DL (ref 7–20)
CALCIUM SERPL-MCNC: 9.1 MG/DL (ref 8.3–10.6)
CHLORIDE SERPL-SCNC: 106 MMOL/L (ref 99–110)
CO2 SERPL-SCNC: 21 MMOL/L (ref 21–32)
CREAT SERPL-MCNC: 0.6 MG/DL (ref 0.6–1.1)
DEPRECATED RDW RBC AUTO: 16 % (ref 12.4–15.4)
GFR SERPLBLD CREATININE-BSD FMLA CKD-EPI: >90 ML/MIN/{1.73_M2}
GLUCOSE SERPL-MCNC: 80 MG/DL (ref 70–99)
HCT VFR BLD AUTO: 43.9 % (ref 36–48)
HGB BLD-MCNC: 14.4 G/DL (ref 12–16)
MCH RBC QN AUTO: 29.1 PG (ref 26–34)
MCHC RBC AUTO-ENTMCNC: 32.8 G/DL (ref 31–36)
MCV RBC AUTO: 88.6 FL (ref 80–100)
PLATELET # BLD AUTO: 347 K/UL (ref 135–450)
PMV BLD AUTO: 8.9 FL (ref 5–10.5)
POTASSIUM SERPL-SCNC: 4.1 MMOL/L (ref 3.5–5.1)
PROT SERPL-MCNC: 6.7 G/DL (ref 6.4–8.2)
RBC # BLD AUTO: 4.96 M/UL (ref 4–5.2)
SODIUM SERPL-SCNC: 141 MMOL/L (ref 136–145)
TSH SERPL DL<=0.005 MIU/L-ACNC: 1.04 UIU/ML (ref 0.27–4.2)
WBC # BLD AUTO: 8.4 K/UL (ref 4–11)

## 2024-10-26 ASSESSMENT — ENCOUNTER SYMPTOMS
EYES NEGATIVE: 1
RESPIRATORY NEGATIVE: 1
GASTROINTESTINAL NEGATIVE: 1
ALLERGIC/IMMUNOLOGIC NEGATIVE: 1

## 2024-10-26 NOTE — PROGRESS NOTES
Subjective   Patient ID: Lory Cr is a 34 y.o. female.    Patient is here for annual. Patient with some irregular bleeding. Patient with cyclic rectal pain? Dysmenorrhea.         Review of Systems   Constitutional: Negative.    HENT: Negative.     Eyes: Negative.    Respiratory: Negative.     Cardiovascular: Negative.    Gastrointestinal: Negative.    Endocrine: Negative.    Genitourinary:  Positive for menstrual problem and pelvic pain.   Musculoskeletal: Negative.    Skin: Negative.    Allergic/Immunologic: Negative.    Neurological: Negative.    Hematological: Negative.    Psychiatric/Behavioral: Negative.       Date of Birth 1990  Past Medical History:   Diagnosis Date    Asthma     controlled    Generalized anxiety disorder     Migraines     Missed  with fetal demise before 20 completed weeks of gestation     At 6 wks  on 24    Moderate episode of recurrent major depressive disorder (HCC) 2018    Morbid obesity with BMI of 50.0-59.9, adult     Unspecified vitamin D deficiency 2011    Vertigo      Past Surgical History:   Procedure Laterality Date    ANUS SURGERY N/A 2024    EXAM UNDER ANESTHESIA, BOTOX INJECTION ANAL FISSURE performed by Samy Cole MD at [facility] [place] OR    DILATION AND CURETTAGE OF UTERUS  2013    DILATION AND CURETTAGE OF UTERUS N/A 2024    DILATATION AND CURETTAGE SUCTION performed by Noreen Rosario MD at [facility] OR    ENDOSCOPY, COLON, DIAGNOSTIC      Pt denies colonoscopy (21)    OTHER SURGICAL HISTORY  2023    REDO OF GASTRIC SLEEVE    SLEEVE GASTRECTOMY N/A 2021    ROBOTIC ASSISTED LAPAROSCOPIC SLEEVE GASTRECTOMY performed by Yovani Landon DO at [facility] [place] OR    UPPER GASTROINTESTINAL ENDOSCOPY N/A 05/10/2021    EGD BIOPSY performed by Yovani Landon DO at [facility] ENDOSCOPY     OB History    Para Term  AB Living   3 1 1 0 2 1   SAB IAB Ectopic Molar Multiple Live Births   0 2 0   0 1      # Outcome Date GA Lbr Be/2nd

## 2024-12-02 ENCOUNTER — HOSPITAL ENCOUNTER (OUTPATIENT)
Dept: PREADMISSION TESTING | Age: 34
Discharge: HOME | End: 2024-12-06
Payer: COMMERCIAL

## 2024-12-02 ENCOUNTER — PREP FOR PROCEDURE (OUTPATIENT)
Dept: GYNECOLOGY | Age: 34
End: 2024-12-02

## 2024-12-02 DIAGNOSIS — Z01.818 PRE-OP EVALUATION: Primary | ICD-10-CM

## 2024-12-02 DIAGNOSIS — Z01.818 PRE-OP EVALUATION: ICD-10-CM

## 2024-12-02 DIAGNOSIS — R10.2 PELVIC PAIN IN FEMALE: ICD-10-CM

## 2024-12-02 DIAGNOSIS — N94.6 DYSMENORRHEA: ICD-10-CM

## 2024-12-02 LAB
ABO + RH BLD: NORMAL
ALBUMIN SERPL-MCNC: 3.5 G/DL (ref 3.4–5)
ALBUMIN/GLOB SERPL: 1.3 {RATIO} (ref 1.1–2.2)
ALP SERPL-CCNC: 76 U/L (ref 40–129)
ALT SERPL-CCNC: 19 U/L (ref 10–40)
ANION GAP SERPL CALCULATED.3IONS-SCNC: 10 MMOL/L (ref 3–16)
AST SERPL-CCNC: 20 U/L (ref 15–37)
B-HCG SERPL EIA 3RD IS-ACNC: <5 MIU/ML
BILIRUB SERPL-MCNC: 0.4 MG/DL (ref 0–1)
BLD GP AB SCN SERPL QL: NORMAL
BUN SERPL-MCNC: 7 MG/DL (ref 7–20)
CALCIUM SERPL-MCNC: 8.8 MG/DL (ref 8.3–10.6)
CHLORIDE SERPL-SCNC: 107 MMOL/L (ref 99–110)
CO2 SERPL-SCNC: 22 MMOL/L (ref 21–32)
CREAT SERPL-MCNC: 0.5 MG/DL (ref 0.6–1.1)
DEPRECATED RDW RBC AUTO: 16.1 % (ref 12.4–15.4)
GFR SERPLBLD CREATININE-BSD FMLA CKD-EPI: >90 ML/MIN/{1.73_M2}
GLUCOSE SERPL-MCNC: 81 MG/DL (ref 70–99)
HCT VFR BLD AUTO: 43.3 % (ref 36–48)
HGB BLD-MCNC: 14.5 G/DL (ref 12–16)
MCH RBC QN AUTO: 29.8 PG (ref 26–34)
MCHC RBC AUTO-ENTMCNC: 33.4 G/DL (ref 31–36)
MCV RBC AUTO: 89 FL (ref 80–100)
PLATELET # BLD AUTO: 267 K/UL (ref 135–450)
PMV BLD AUTO: 9.1 FL (ref 5–10.5)
POTASSIUM SERPL-SCNC: 3.5 MMOL/L (ref 3.5–5.1)
PROT SERPL-MCNC: 6.3 G/DL (ref 6.4–8.2)
RBC # BLD AUTO: 4.87 M/UL (ref 4–5.2)
SODIUM SERPL-SCNC: 139 MMOL/L (ref 136–145)
WBC # BLD AUTO: 8.7 K/UL (ref 4–11)

## 2024-12-02 PROCEDURE — 93005 ELECTROCARDIOGRAM TRACING: CPT

## 2024-12-02 PROCEDURE — 86900 BLOOD TYPING SEROLOGIC ABO: CPT

## 2024-12-02 PROCEDURE — 80053 COMPREHEN METABOLIC PANEL: CPT

## 2024-12-02 PROCEDURE — 84702 CHORIONIC GONADOTROPIN TEST: CPT

## 2024-12-02 PROCEDURE — 86850 RBC ANTIBODY SCREEN: CPT

## 2024-12-02 PROCEDURE — 85027 COMPLETE CBC AUTOMATED: CPT

## 2024-12-02 PROCEDURE — 86901 BLOOD TYPING SEROLOGIC RH(D): CPT

## 2024-12-02 RX ORDER — ACETAMINOPHEN 325 MG/1
1000 TABLET ORAL ONCE
Status: CANCELLED | OUTPATIENT
Start: 2024-12-02 | End: 2024-12-02

## 2024-12-02 RX ORDER — SODIUM CHLORIDE, SODIUM LACTATE, POTASSIUM CHLORIDE, CALCIUM CHLORIDE 600; 310; 30; 20 MG/100ML; MG/100ML; MG/100ML; MG/100ML
INJECTION, SOLUTION INTRAVENOUS CONTINUOUS
Status: CANCELLED | OUTPATIENT
Start: 2024-12-02

## 2024-12-02 RX ORDER — SODIUM CHLORIDE 0.9 % (FLUSH) 0.9 %
5-40 SYRINGE (ML) INJECTION EVERY 12 HOURS SCHEDULED
Status: CANCELLED | OUTPATIENT
Start: 2024-12-02

## 2024-12-02 RX ORDER — SODIUM CHLORIDE 0.9 % (FLUSH) 0.9 %
5-40 SYRINGE (ML) INJECTION PRN
Status: CANCELLED | OUTPATIENT
Start: 2024-12-02

## 2024-12-02 RX ORDER — SODIUM CHLORIDE 9 MG/ML
INJECTION, SOLUTION INTRAVENOUS PRN
Status: CANCELLED | OUTPATIENT
Start: 2024-12-02

## 2024-12-02 NOTE — PROGRESS NOTES
[facility] PRE-OPERATIVE INSTRUCTIONS    Day of Procedure:  12/1/2024              Arrival time:  0700                Surgery time: 0830    Take the following medications with a sip of water:  Follow your MD/Surgeons pre-procedure instructions regarding your medications     Do not eat or drink anything after 12:00 midnight prior to your surgery.  This includes water, chewing gum, mints and ice chips.   You may brush your teeth and gargle the morning of your surgery, but do not swallow the water.     Please see your family doctor/pediatrician for a history and physical and/or concerning medications.   Bring any test results/reports from your physicians office.   If you are under the care of a heart doctor or specialist doctor, please be aware that you may be asked to see them for clearance.    You may be asked to stop blood thinners such as Coumadin, Plavix, Fragmin, Lovenox, etc., or any anti-inflammatories such as:  Aspirin, Ibuprofen, Advil, Naproxen prior to your surgery.    We also ask that you stop any over the counter medications such as fish oil, vitamin E, glucosamine, garlic, Multivitamins, COQ 10, etc.    We ask that you do not smoke 24 hours prior to surgery.  We ask that you do not  drink any alcoholic beverages 24 hours prior to surgery     You must make arrangements for a responsible adult to take you home after your surgery.    For your safety, you will not be allowed to leave alone or drive yourself home.  Your surgery will be cancelled if you do not have a ride home.     Also for your safety, you must have someone stay with you the first 24 hours after your surgery.     A parent or legal guardian must accompany a child scheduled for surgery and plan to stay at the hospital until the child is discharged.    Please do not bring other children with you.    For your comfort, please wear simple loose fitting clothing to the hospital.  Please do not bring valuables.    Do not wear any make-up

## 2024-12-03 ENCOUNTER — TELEPHONE (OUTPATIENT)
Dept: GYNECOLOGY | Age: 34
End: 2024-12-03

## 2024-12-03 NOTE — TELEPHONE ENCOUNTER
Blanca from [facility] Pre admissions testing is calling in for / MA in regards to patient having surgery next week and are wanting to know if patient needs HX and physical for surgery.     Please call back blanca at P# [phone]

## 2024-12-04 LAB
EKG ATRIAL RATE: 68 BPM
EKG DIAGNOSIS: NORMAL
EKG P AXIS: 51 DEGREES
EKG P-R INTERVAL: 180 MS
EKG Q-T INTERVAL: 398 MS
EKG QRS DURATION: 82 MS
EKG QTC CALCULATION (BAZETT): 423 MS
EKG R AXIS: 31 DEGREES
EKG T AXIS: 28 DEGREES
EKG VENTRICULAR RATE: 68 BPM

## 2024-12-04 PROCEDURE — 93010 ELECTROCARDIOGRAM REPORT: CPT | Performed by: STUDENT IN AN ORGANIZED HEALTH CARE EDUCATION/TRAINING PROGRAM

## 2024-12-09 ENCOUNTER — ANESTHESIA EVENT (OUTPATIENT)
Dept: OPERATING ROOM | Age: 34
End: 2024-12-09
Payer: COMMERCIAL

## 2024-12-10 ASSESSMENT — LIFESTYLE VARIABLES: SMOKING_STATUS: 0

## 2024-12-10 ASSESSMENT — ENCOUNTER SYMPTOMS: SHORTNESS OF BREATH: 0

## 2024-12-11 ENCOUNTER — ANESTHESIA (OUTPATIENT)
Dept: OPERATING ROOM | Age: 34
End: 2024-12-11
Payer: COMMERCIAL

## 2024-12-11 ENCOUNTER — HOSPITAL ENCOUNTER (OUTPATIENT)
Age: 34
Setting detail: OUTPATIENT SURGERY
Discharge: HOME | End: 2024-12-11
Attending: OBSTETRICS & GYNECOLOGY | Admitting: OBSTETRICS & GYNECOLOGY
Payer: COMMERCIAL

## 2024-12-11 VITALS
HEIGHT: 69 IN | RESPIRATION RATE: 18 BRPM | WEIGHT: 235.8 LBS | OXYGEN SATURATION: 99 % | BODY MASS INDEX: 34.93 KG/M2 | HEART RATE: 69 BPM | DIASTOLIC BLOOD PRESSURE: 78 MMHG | SYSTOLIC BLOOD PRESSURE: 117 MMHG | TEMPERATURE: 97.1 F

## 2024-12-11 DIAGNOSIS — R10.2 PELVIC PAIN IN FEMALE: ICD-10-CM

## 2024-12-11 DIAGNOSIS — Z98.890 S/P LAPAROSCOPY: Primary | ICD-10-CM

## 2024-12-11 DIAGNOSIS — N94.6 DYSMENORRHEA: ICD-10-CM

## 2024-12-11 PROBLEM — D21.9 FIBROID: Status: ACTIVE | Noted: 2024-12-11

## 2024-12-11 PROBLEM — N80.9 ENDOMETRIOSIS: Status: ACTIVE | Noted: 2024-12-11

## 2024-12-11 LAB
HCG UR QL: NEGATIVE
REASON FOR REJECTION: NORMAL
REJECTED TEST: NORMAL

## 2024-12-11 PROCEDURE — 2709999900 HC NON-CHARGEABLE SUPPLY: Performed by: OBSTETRICS & GYNECOLOGY

## 2024-12-11 PROCEDURE — 6360000002 HC RX W HCPCS: Performed by: OBSTETRICS & GYNECOLOGY

## 2024-12-11 PROCEDURE — 2500000003 HC RX 250 WO HCPCS: Performed by: NURSE ANESTHETIST, CERTIFIED REGISTERED

## 2024-12-11 PROCEDURE — 88305 TISSUE EXAM BY PATHOLOGIST: CPT

## 2024-12-11 PROCEDURE — 7100000000 HC PACU RECOVERY - FIRST 15 MIN: Performed by: OBSTETRICS & GYNECOLOGY

## 2024-12-11 PROCEDURE — 6360000002 HC RX W HCPCS: Performed by: NURSE ANESTHETIST, CERTIFIED REGISTERED

## 2024-12-11 PROCEDURE — 7100000001 HC PACU RECOVERY - ADDTL 15 MIN: Performed by: OBSTETRICS & GYNECOLOGY

## 2024-12-11 PROCEDURE — 2580000003 HC RX 258: Performed by: NURSE ANESTHETIST, CERTIFIED REGISTERED

## 2024-12-11 PROCEDURE — 3700000000 HC ANESTHESIA ATTENDED CARE: Performed by: OBSTETRICS & GYNECOLOGY

## 2024-12-11 PROCEDURE — 3600000004 HC SURGERY LEVEL 4 BASE: Performed by: OBSTETRICS & GYNECOLOGY

## 2024-12-11 PROCEDURE — 3700000001 HC ADD 15 MINUTES (ANESTHESIA): Performed by: OBSTETRICS & GYNECOLOGY

## 2024-12-11 PROCEDURE — 3600000014 HC SURGERY LEVEL 4 ADDTL 15MIN: Performed by: OBSTETRICS & GYNECOLOGY

## 2024-12-11 PROCEDURE — A4217 STERILE WATER/SALINE, 500 ML: HCPCS | Performed by: OBSTETRICS & GYNECOLOGY

## 2024-12-11 PROCEDURE — 7100000010 HC PHASE II RECOVERY - FIRST 15 MIN: Performed by: OBSTETRICS & GYNECOLOGY

## 2024-12-11 PROCEDURE — 6370000000 HC RX 637 (ALT 250 FOR IP): Performed by: STUDENT IN AN ORGANIZED HEALTH CARE EDUCATION/TRAINING PROGRAM

## 2024-12-11 PROCEDURE — 2580000003 HC RX 258: Performed by: OBSTETRICS & GYNECOLOGY

## 2024-12-11 PROCEDURE — 84703 CHORIONIC GONADOTROPIN ASSAY: CPT

## 2024-12-11 PROCEDURE — 2720000010 HC SURG SUPPLY STERILE: Performed by: OBSTETRICS & GYNECOLOGY

## 2024-12-11 PROCEDURE — 7100000011 HC PHASE II RECOVERY - ADDTL 15 MIN: Performed by: OBSTETRICS & GYNECOLOGY

## 2024-12-11 PROCEDURE — 6360000002 HC RX W HCPCS: Performed by: STUDENT IN AN ORGANIZED HEALTH CARE EDUCATION/TRAINING PROGRAM

## 2024-12-11 RX ORDER — ONDANSETRON 2 MG/ML
INJECTION INTRAMUSCULAR; INTRAVENOUS
Status: DISCONTINUED | OUTPATIENT
Start: 2024-12-11 | End: 2024-12-11 | Stop reason: SDUPTHER

## 2024-12-11 RX ORDER — SODIUM CHLORIDE 0.9 % (FLUSH) 0.9 %
5-40 SYRINGE (ML) INJECTION PRN
Status: DISCONTINUED | OUTPATIENT
Start: 2024-12-11 | End: 2024-12-11 | Stop reason: HOSPADM

## 2024-12-11 RX ORDER — BUPIVACAINE HYDROCHLORIDE 2.5 MG/ML
INJECTION, SOLUTION EPIDURAL; INFILTRATION; INTRACAUDAL
Status: COMPLETED | OUTPATIENT
Start: 2024-12-11 | End: 2024-12-11

## 2024-12-11 RX ORDER — SODIUM CHLORIDE 9 MG/ML
INJECTION, SOLUTION INTRAVENOUS PRN
Status: DISCONTINUED | OUTPATIENT
Start: 2024-12-11 | End: 2024-12-11 | Stop reason: HOSPADM

## 2024-12-11 RX ORDER — SODIUM CHLORIDE 0.9 % (FLUSH) 0.9 %
5-40 SYRINGE (ML) INJECTION EVERY 12 HOURS SCHEDULED
Status: DISCONTINUED | OUTPATIENT
Start: 2024-12-11 | End: 2024-12-11 | Stop reason: HOSPADM

## 2024-12-11 RX ORDER — FENTANYL CITRATE 50 UG/ML
INJECTION, SOLUTION INTRAMUSCULAR; INTRAVENOUS
Status: DISCONTINUED | OUTPATIENT
Start: 2024-12-11 | End: 2024-12-11 | Stop reason: SDUPTHER

## 2024-12-11 RX ORDER — METRONIDAZOLE 500 MG/100ML
500 INJECTION, SOLUTION INTRAVENOUS
Status: COMPLETED | OUTPATIENT
Start: 2024-12-11 | End: 2024-12-11

## 2024-12-11 RX ORDER — DEXAMETHASONE SODIUM PHOSPHATE 4 MG/ML
INJECTION, SOLUTION INTRA-ARTICULAR; INTRALESIONAL; INTRAMUSCULAR; INTRAVENOUS; SOFT TISSUE
Status: DISCONTINUED | OUTPATIENT
Start: 2024-12-11 | End: 2024-12-11 | Stop reason: SDUPTHER

## 2024-12-11 RX ORDER — LABETALOL HYDROCHLORIDE 5 MG/ML
10 INJECTION, SOLUTION INTRAVENOUS
Status: DISCONTINUED | OUTPATIENT
Start: 2024-12-11 | End: 2024-12-11 | Stop reason: HOSPADM

## 2024-12-11 RX ORDER — ROCURONIUM BROMIDE 10 MG/ML
INJECTION, SOLUTION INTRAVENOUS
Status: DISCONTINUED | OUTPATIENT
Start: 2024-12-11 | End: 2024-12-11 | Stop reason: SDUPTHER

## 2024-12-11 RX ORDER — LIDOCAINE HYDROCHLORIDE 20 MG/ML
INJECTION, SOLUTION EPIDURAL; INFILTRATION; INTRACAUDAL; PERINEURAL
Status: DISCONTINUED | OUTPATIENT
Start: 2024-12-11 | End: 2024-12-11 | Stop reason: SDUPTHER

## 2024-12-11 RX ORDER — HYDRALAZINE HYDROCHLORIDE 20 MG/ML
10 INJECTION INTRAMUSCULAR; INTRAVENOUS
Status: DISCONTINUED | OUTPATIENT
Start: 2024-12-11 | End: 2024-12-11 | Stop reason: HOSPADM

## 2024-12-11 RX ORDER — FENTANYL CITRATE 0.05 MG/ML
50 INJECTION, SOLUTION INTRAMUSCULAR; INTRAVENOUS EVERY 5 MIN PRN
Status: DISCONTINUED | OUTPATIENT
Start: 2024-12-11 | End: 2024-12-11 | Stop reason: HOSPADM

## 2024-12-11 RX ORDER — SODIUM CHLORIDE 9 MG/ML
INJECTION, SOLUTION INTRAVENOUS PRN
Status: DISCONTINUED | OUTPATIENT
Start: 2024-12-11 | End: 2024-12-11

## 2024-12-11 RX ORDER — ONDANSETRON 2 MG/ML
4 INJECTION INTRAMUSCULAR; INTRAVENOUS
Status: DISCONTINUED | OUTPATIENT
Start: 2024-12-11 | End: 2024-12-11 | Stop reason: HOSPADM

## 2024-12-11 RX ORDER — MAGNESIUM HYDROXIDE 1200 MG/15ML
LIQUID ORAL CONTINUOUS PRN
Status: COMPLETED | OUTPATIENT
Start: 2024-12-11 | End: 2024-12-11

## 2024-12-11 RX ORDER — DEXMEDETOMIDINE HYDROCHLORIDE 100 UG/ML
INJECTION, SOLUTION INTRAVENOUS
Status: DISCONTINUED | OUTPATIENT
Start: 2024-12-11 | End: 2024-12-11 | Stop reason: SDUPTHER

## 2024-12-11 RX ORDER — MIDAZOLAM HYDROCHLORIDE 1 MG/ML
INJECTION, SOLUTION INTRAMUSCULAR; INTRAVENOUS
Status: DISCONTINUED | OUTPATIENT
Start: 2024-12-11 | End: 2024-12-11 | Stop reason: SDUPTHER

## 2024-12-11 RX ORDER — PROPOFOL 10 MG/ML
INJECTION, EMULSION INTRAVENOUS
Status: DISCONTINUED | OUTPATIENT
Start: 2024-12-11 | End: 2024-12-11 | Stop reason: SDUPTHER

## 2024-12-11 RX ORDER — SODIUM CHLORIDE 9 MG/ML
INJECTION, SOLUTION INTRAMUSCULAR; INTRAVENOUS; SUBCUTANEOUS
Status: DISCONTINUED | OUTPATIENT
Start: 2024-12-11 | End: 2024-12-11 | Stop reason: SDUPTHER

## 2024-12-11 RX ORDER — SODIUM CHLORIDE 9 MG/ML
INJECTION, SOLUTION INTRAVENOUS CONTINUOUS
Status: DISCONTINUED | OUTPATIENT
Start: 2024-12-11 | End: 2024-12-11 | Stop reason: HOSPADM

## 2024-12-11 RX ORDER — SODIUM CHLORIDE 0.9 % (FLUSH) 0.9 %
5-40 SYRINGE (ML) INJECTION EVERY 12 HOURS SCHEDULED
Status: DISCONTINUED | OUTPATIENT
Start: 2024-12-11 | End: 2024-12-11

## 2024-12-11 RX ORDER — PROCHLORPERAZINE EDISYLATE 5 MG/ML
5 INJECTION INTRAMUSCULAR; INTRAVENOUS
Status: DISCONTINUED | OUTPATIENT
Start: 2024-12-11 | End: 2024-12-11 | Stop reason: HOSPADM

## 2024-12-11 RX ORDER — SODIUM CHLORIDE, SODIUM LACTATE, POTASSIUM CHLORIDE, CALCIUM CHLORIDE 600; 310; 30; 20 MG/100ML; MG/100ML; MG/100ML; MG/100ML
INJECTION, SOLUTION INTRAVENOUS CONTINUOUS
Status: DISCONTINUED | OUTPATIENT
Start: 2024-12-11 | End: 2024-12-11 | Stop reason: RX

## 2024-12-11 RX ORDER — SUCCINYLCHOLINE/SOD CL,ISO/PF 200MG/10ML
SYRINGE (ML) INTRAVENOUS
Status: DISCONTINUED | OUTPATIENT
Start: 2024-12-11 | End: 2024-12-11 | Stop reason: SDUPTHER

## 2024-12-11 RX ORDER — SODIUM CHLORIDE 0.9 % (FLUSH) 0.9 %
5-40 SYRINGE (ML) INJECTION PRN
Status: DISCONTINUED | OUTPATIENT
Start: 2024-12-11 | End: 2024-12-11

## 2024-12-11 RX ORDER — OXYCODONE HYDROCHLORIDE 5 MG/1
5 TABLET ORAL PRN
Status: COMPLETED | OUTPATIENT
Start: 2024-12-11 | End: 2024-12-11

## 2024-12-11 RX ORDER — NALOXONE HYDROCHLORIDE 0.4 MG/ML
INJECTION, SOLUTION INTRAMUSCULAR; INTRAVENOUS; SUBCUTANEOUS PRN
Status: DISCONTINUED | OUTPATIENT
Start: 2024-12-11 | End: 2024-12-11 | Stop reason: HOSPADM

## 2024-12-11 RX ORDER — FENTANYL CITRATE 0.05 MG/ML
25 INJECTION, SOLUTION INTRAMUSCULAR; INTRAVENOUS EVERY 5 MIN PRN
Status: DISCONTINUED | OUTPATIENT
Start: 2024-12-11 | End: 2024-12-11 | Stop reason: HOSPADM

## 2024-12-11 RX ORDER — METHOCARBAMOL 100 MG/ML
INJECTION, SOLUTION INTRAMUSCULAR; INTRAVENOUS
Status: DISCONTINUED | OUTPATIENT
Start: 2024-12-11 | End: 2024-12-11 | Stop reason: SDUPTHER

## 2024-12-11 RX ORDER — IPRATROPIUM BROMIDE AND ALBUTEROL SULFATE 2.5; .5 MG/3ML; MG/3ML
1 SOLUTION RESPIRATORY (INHALATION)
Status: DISCONTINUED | OUTPATIENT
Start: 2024-12-11 | End: 2024-12-11 | Stop reason: HOSPADM

## 2024-12-11 RX ORDER — OXYCODONE HYDROCHLORIDE 10 MG/1
10 TABLET ORAL PRN
Status: COMPLETED | OUTPATIENT
Start: 2024-12-11 | End: 2024-12-11

## 2024-12-11 RX ORDER — LORAZEPAM 2 MG/ML
1 INJECTION INTRAMUSCULAR
Status: DISCONTINUED | OUTPATIENT
Start: 2024-12-11 | End: 2024-12-11 | Stop reason: HOSPADM

## 2024-12-11 RX ORDER — MEPERIDINE HYDROCHLORIDE 25 MG/ML
12.5 INJECTION INTRAMUSCULAR; INTRAVENOUS; SUBCUTANEOUS EVERY 5 MIN PRN
Status: DISCONTINUED | OUTPATIENT
Start: 2024-12-11 | End: 2024-12-11 | Stop reason: HOSPADM

## 2024-12-11 RX ORDER — OXYCODONE AND ACETAMINOPHEN 5; 325 MG/1; MG/1
1-2 TABLET ORAL EVERY 4 HOURS PRN
Qty: 30 TABLET | Refills: 0 | Status: SHIPPED | OUTPATIENT
Start: 2024-12-11 | End: 2024-12-18

## 2024-12-11 RX ORDER — GLYCOPYRROLATE 0.2 MG/ML
INJECTION INTRAMUSCULAR; INTRAVENOUS
Status: DISCONTINUED | OUTPATIENT
Start: 2024-12-11 | End: 2024-12-11 | Stop reason: SDUPTHER

## 2024-12-11 RX ADMIN — MIDAZOLAM 2 MG: 1 INJECTION INTRAMUSCULAR; INTRAVENOUS at 08:39

## 2024-12-11 RX ADMIN — DEXMEDETOMIDINE HYDROCHLORIDE 4 MCG: 100 INJECTION, SOLUTION INTRAVENOUS at 08:44

## 2024-12-11 RX ADMIN — METRONIDAZOLE 500 MG: 500 INJECTION, SOLUTION INTRAVENOUS at 07:48

## 2024-12-11 RX ADMIN — SODIUM CHLORIDE 50 ML: 9 INJECTION INTRAMUSCULAR; INTRAVENOUS; SUBCUTANEOUS at 08:44

## 2024-12-11 RX ADMIN — ROCURONIUM BROMIDE 10 MG: 10 SOLUTION INTRAVENOUS at 09:04

## 2024-12-11 RX ADMIN — FENTANYL CITRATE 100 MCG: 50 INJECTION INTRAMUSCULAR; INTRAVENOUS at 08:44

## 2024-12-11 RX ADMIN — SODIUM CHLORIDE 2000 MG: 900 INJECTION INTRAVENOUS at 08:47

## 2024-12-11 RX ADMIN — FENTANYL CITRATE 50 MCG: 0.05 INJECTION, SOLUTION INTRAMUSCULAR; INTRAVENOUS at 10:47

## 2024-12-11 RX ADMIN — MEPERIDINE HYDROCHLORIDE 12.5 MG: 25 INJECTION INTRAMUSCULAR; INTRAVENOUS; SUBCUTANEOUS at 10:32

## 2024-12-11 RX ADMIN — METHOCARBAMOL 100 MG: 100 INJECTION INTRAMUSCULAR; INTRAVENOUS at 10:07

## 2024-12-11 RX ADMIN — OXYCODONE HYDROCHLORIDE 10 MG: 10 TABLET ORAL at 12:21

## 2024-12-11 RX ADMIN — ROCURONIUM BROMIDE 5 MG: 10 SOLUTION INTRAVENOUS at 08:44

## 2024-12-11 RX ADMIN — SUGAMMADEX 200 MG: 100 INJECTION, SOLUTION INTRAVENOUS at 09:56

## 2024-12-11 RX ADMIN — ROCURONIUM BROMIDE 35 MG: 10 SOLUTION INTRAVENOUS at 08:53

## 2024-12-11 RX ADMIN — METHOCARBAMOL 100 MG: 100 INJECTION INTRAMUSCULAR; INTRAVENOUS at 09:54

## 2024-12-11 RX ADMIN — DEXMEDETOMIDINE HYDROCHLORIDE 4 MCG: 100 INJECTION, SOLUTION INTRAVENOUS at 08:53

## 2024-12-11 RX ADMIN — METHOCARBAMOL 100 MG: 100 INJECTION INTRAMUSCULAR; INTRAVENOUS at 09:03

## 2024-12-11 RX ADMIN — ONDANSETRON 4 MG: 2 INJECTION INTRAMUSCULAR; INTRAVENOUS at 08:53

## 2024-12-11 RX ADMIN — SODIUM CHLORIDE: 9 INJECTION, SOLUTION INTRAVENOUS at 08:38

## 2024-12-11 RX ADMIN — FENTANYL CITRATE 50 MCG: 0.05 INJECTION, SOLUTION INTRAMUSCULAR; INTRAVENOUS at 11:31

## 2024-12-11 RX ADMIN — PROPOFOL 180 MG: 10 INJECTION, EMULSION INTRAVENOUS at 08:44

## 2024-12-11 RX ADMIN — DEXMEDETOMIDINE HYDROCHLORIDE 4 MCG: 100 INJECTION, SOLUTION INTRAVENOUS at 09:40

## 2024-12-11 RX ADMIN — DEXMEDETOMIDINE HYDROCHLORIDE 4 MCG: 100 INJECTION, SOLUTION INTRAVENOUS at 09:59

## 2024-12-11 RX ADMIN — GLYCOPYRROLATE 0.2 MG: 0.2 INJECTION INTRAMUSCULAR; INTRAVENOUS at 08:39

## 2024-12-11 RX ADMIN — METHOCARBAMOL 100 MG: 100 INJECTION INTRAMUSCULAR; INTRAVENOUS at 09:36

## 2024-12-11 RX ADMIN — Medication 120 MG: at 08:44

## 2024-12-11 RX ADMIN — DEXAMETHASONE SODIUM PHOSPHATE 8 MG: 4 INJECTION, SOLUTION INTRAMUSCULAR; INTRAVENOUS at 08:53

## 2024-12-11 RX ADMIN — LIDOCAINE HYDROCHLORIDE 60 MG: 20 INJECTION, SOLUTION EPIDURAL; INFILTRATION; INTRACAUDAL; PERINEURAL at 08:44

## 2024-12-11 RX ADMIN — METHOCARBAMOL 100 MG: 100 INJECTION INTRAMUSCULAR; INTRAVENOUS at 09:19

## 2024-12-11 RX ADMIN — METHOCARBAMOL 200 MG: 100 INJECTION INTRAMUSCULAR; INTRAVENOUS at 08:53

## 2024-12-11 ASSESSMENT — PAIN DESCRIPTION - DESCRIPTORS
DESCRIPTORS: CRAMPING
DESCRIPTORS: ACHING
DESCRIPTORS: CRAMPING
DESCRIPTORS: ACHING

## 2024-12-11 ASSESSMENT — PAIN - FUNCTIONAL ASSESSMENT
PAIN_FUNCTIONAL_ASSESSMENT: PREVENTS OR INTERFERES SOME ACTIVE ACTIVITIES AND ADLS
PAIN_FUNCTIONAL_ASSESSMENT: PREVENTS OR INTERFERES SOME ACTIVE ACTIVITIES AND ADLS
PAIN_FUNCTIONAL_ASSESSMENT: ADULT NONVERBAL PAIN SCALE (NPVS)
PAIN_FUNCTIONAL_ASSESSMENT: 0-10

## 2024-12-11 ASSESSMENT — PAIN DESCRIPTION - ONSET
ONSET: ON-GOING
ONSET: ON-GOING

## 2024-12-11 ASSESSMENT — PAIN DESCRIPTION - LOCATION
LOCATION: ABDOMEN

## 2024-12-11 ASSESSMENT — PAIN DESCRIPTION - ORIENTATION
ORIENTATION: LOWER
ORIENTATION: LOWER
ORIENTATION: MID

## 2024-12-11 ASSESSMENT — PAIN SCALES - GENERAL
PAINLEVEL_OUTOF10: 8
PAINLEVEL_OUTOF10: 7
PAINLEVEL_OUTOF10: 7
PAINLEVEL_OUTOF10: 5

## 2024-12-11 ASSESSMENT — PAIN DESCRIPTION - PAIN TYPE
TYPE: SURGICAL PAIN
TYPE: SURGICAL PAIN

## 2024-12-11 ASSESSMENT — PAIN DESCRIPTION - FREQUENCY
FREQUENCY: CONTINUOUS
FREQUENCY: CONTINUOUS

## 2024-12-11 NOTE — BRIEF OP NOTE
Brief Postoperative Note      Patient: Lory Cr  YOB: 1990  MRN: [id number]    Date of Procedure: 12/11/2024    Pre-Op Diagnosis Codes:      * Pelvic pain in female [R10.2]     * Dysmenorrhea [N94.6]    Post-Op Diagnosis: Same, endometriosis, fibroids    Procedure: Diagnostic laparoscopy, removal of fibroid, cautery of fibroid and cautery of endometriosis    Surgeon(s):  Noreen Rosario MD    Assistant:  Surgical Assistant: Gavin Mccrary    Anesthesia: General    Estimated Blood Loss (mL): less than 100 ml    Complications: None    Specimens:   ID Type Source Tests Collected by Time Destination   A : A) FIBROID Tissue Tissue SURGICAL PATHOLOGY Noreen Rosario MD 12/11/2024 0930        Implants:  * No implants in log *      Drains:   [REMOVED] Urinary Catheter 12/11/24 2 Way (Removed)       Findings:  Infection Present At Time Of Surgery (PATOS) (choose all levels that have infection present):  No infection present  Other Findings: 3 fibroids-one on left round ligament removed, one posterior 1 cm-cauterized and 3 cm upper left still in uterus, endometriosis in posterior cul-de-sac, normal ovaries, normal tubes, normal appendix    Electronically signed by Noreen Rosario MD on 12/11/2024 at 10:02 AM

## 2024-12-11 NOTE — PROGRESS NOTES
Blood work sent at 745 am. Lab called and stated blood was hemolyzed at 811 am. Will be sending up a  to redraw. Will monitor.

## 2024-12-11 NOTE — PROGRESS NOTES
Vaginal Sweep Documentation     Vaginal prep sponge count performed by RUSSEL ROBERTSON and SA MARKELL. Count correct.   Vaginal sweep performed by DR. HOGAN at 0954. No foreign objects or vaginal tears noted.

## 2024-12-11 NOTE — ANESTHESIA PRE PROCEDURE
Department of Anesthesiology  Preprocedure Note       Name:  Lory Cr   Age:  34 y.o.  :  1990                                          MRN:  [id number]         Date:  2024      Surgeon: Surgeon(s):  Noreen Rosario MD    Procedure: Procedure(s):  DIAGNOSTIC LAPAROSCOPY    Medications prior to admission:   Prior to Admission medications    Medication Sig Start Date End Date Taking? Authorizing Provider   albuterol sulfate HFA (PROVENTIL;VENTOLIN;PROAIR) 108 (90 Base) MCG/ACT inhaler Inhale 2 puffs into the lungs every 6 hours as needed   Yes ProviderTrent MD   ibuprofen (ADVIL;MOTRIN) 600 MG tablet Take 1 tablet by mouth every 6 hours as needed for Pain 24  Yes Noreen Rosario MD   Multiple Vitamin (MULTIVITAMIN, BARIATRIC FUSION COMPLETE, CHEW TAB) Take 4 tablets by mouth daily   Yes ProviderTrent MD       Current medications:    Current Facility-Administered Medications   Medication Dose Route Frequency Provider Last Rate Last Admin    sodium chloride flush 0.9 % injection 5-40 mL  5-40 mL IntraVENous 2 times per day Gene Velasquez MD        sodium chloride flush 0.9 % injection 5-40 mL  5-40 mL IntraVENous PRN Gene Velasquez MD        0.9 % sodium chloride infusion   IntraVENous PRN Gene Velasquez MD        metroNIDAZOLE (FLAGYL) 500 mg in 0.9% NaCl 100 mL IVPB premix  500 mg IntraVENous On Call to OR Noreen Rosario MD        And    ceFAZolin (ANCEF) 2,000 mg in sodium chloride 0.9 % 50 mL IVPB (mini-bag)  2,000 mg IntraVENous Once Noreen Rosario MD        0.9 % sodium chloride infusion   IntraVENous Continuous Noreen Rosario MD           Allergies:  No Known Allergies    Problem List:    Patient Active Problem List   Diagnosis Code    Obesity E66.9    Vitamin D deficiency E55.9    Insomnia G47.00    Generalized anxiety disorder F41.1    Obstructive sleep apnea G47.33    Chronic GERD K21.9    Miscarriage O03.9    Anal fissure K60.2    Pelvic pain in

## 2024-12-11 NOTE — DISCHARGE INSTRUCTIONS
Today you had a Laparoscopy, removal of fibroid, cautery of endometriosis    Please call if you have any  -Fever greater than 101.  -Foul smelling discharge  -Bleeding heavier than heavy period    Other instructions  -No driving while on Percocet  -Nothing in vagina for 4 weeks-no tampons or intercourse  -Begin exercise slowly in 2 weeks.  -Keep incisions clean and dry.   -You can take top dressings off in 2-3 days        Noreen Rosario MD  [address], [place] [address] [phone]        Make an appointment with me in 2-3 weeks.

## 2024-12-11 NOTE — ANESTHESIA POSTPROCEDURE EVALUATION
Department of Anesthesiology  Postprocedure Note    Patient: Lory Cr  MRN: [id number]  YOB: 1990  Date of evaluation: 12/11/2024    Procedure Summary       Date: 12/11/24 Room / Location: [address]    Anesthesia Start: 0839 Anesthesia Stop: 1022    Procedure: DIAGNOSTIC LAPAROSCOPY, REMOVED FIBROID AND CAUTERIZATON OF ENDOMETRIUM (Abdomen/Perineum) Diagnosis:       Pelvic pain in female      Dysmenorrhea      (Pelvic pain in female [R10.2])      (Dysmenorrhea [N94.6])    Surgeons: Noreen Rosario MD Responsible Provider: Donavon Clayton MD    Anesthesia Type: general ASA Status: 2            Anesthesia Type: General    Leonardo Phase I: Leonardo Score: 8    Leonardo Phase II:      Anesthesia Post Evaluation    Patient location during evaluation: PACU  Patient participation: complete - patient participated  Level of consciousness: sleepy but conscious  Airway patency: patent  Nausea & Vomiting: no nausea and no vomiting  Cardiovascular status: hemodynamically stable and blood pressure returned to baseline  Respiratory status: spontaneous ventilation, nonlabored ventilation and room air  Hydration status: stable  Comments: Resting comfortably. Will allow patient to become more alert undisturbed. No acute concerns at this time.   Pain management: adequate      No notable events documented.

## 2024-12-11 NOTE — PROGRESS NOTES
Pt states ready to go home. Pt discharged in stable condition. Pt states pain 7/10,but tolerable. No nausea or other c/o. Pt has rx fm [employer]. Also instructed on cdb/ap/pillow/annie-verbalized understanding. Sites reviewed by friends with her. Pt has ice packs nicole pads and mesh pants.

## 2024-12-11 NOTE — PROGRESS NOTES
Pt arrived to PACU from OR. Pt asleep on 3l/nc with oral airway in place. Abd soft. Dressings x 3 C/D/I. No vaginal bleeding noted.

## 2024-12-11 NOTE — H&P
Patient is a 34 year old F with dysmenorrhea, pelvic pain, rectal pain? For diagnostic laparoscopy.     Review of Systems: as above  General ROS: negative  Psychological ROS: negative  Ophthalmic ROS: negative  ENT ROS: negative  Allergy and Immunology ROS: negative  Hematological and Lymphatic ROS: negative  Endocrine ROS: negative  Breast ROS: negative  Respiratory ROS: negative  Cardiovascular ROS: negative  Gastrointestinal ROS: negative  Genito-Urinary ROS: as above  Musculoskeletal ROS: negative  Neurological ROS: negative  Dermatological ROS: negative     Date of Birth 1990  Past Medical History:   Diagnosis Date    Asthma     controlled    Generalized anxiety disorder     Migraines     Missed  with fetal demise before 20 completed weeks of gestation     At 6 wks  on 24    Moderate episode of recurrent major depressive disorder (HCC) 2018    Morbid obesity with BMI of 50.0-59.9, adult     Unspecified vitamin D deficiency 2011    Vertigo      Past Surgical History:   Procedure Laterality Date    ANUS SURGERY N/A 2024    EXAM UNDER ANESTHESIA, BOTOX INJECTION ANAL FISSURE performed by Samy Cole MD at [facility] OR    DILATION AND CURETTAGE OF UTERUS  2013    DILATION AND CURETTAGE OF UTERUS N/A 2024    DILATATION AND CURETTAGE SUCTION performed by Noreen Rosario MD at [facility] OR    ENDOSCOPY, COLON, DIAGNOSTIC      Pt denies colonoscopy (21)    OTHER SURGICAL HISTORY  2023    REDO OF GASTRIC SLEEVE    SLEEVE GASTRECTOMY N/A 2021    ROBOTIC ASSISTED LAPAROSCOPIC SLEEVE GASTRECTOMY performed by Yovani Landon DO at [facility] OR    UPPER GASTROINTESTINAL ENDOSCOPY N/A 05/10/2021    EGD BIOPSY performed by Yovani Landon DO at [facility] ENDOSCOPY     OB History    Para Term  AB Living   3 1 1 0 2 1   SAB IAB Ectopic Molar Multiple Live Births   0 2 0   0 1      # Outcome Date GA Lbr Be/2nd Weight Sex Type Anes PTL Lv   3 Term  40w0d   F Vag-Spont   RANCHO   2  Allergies  Outpatient Medications Marked as Taking for the 12/11/24 encounter (Hospital Encounter)   Medication Sig Dispense Refill    albuterol sulfate HFA (PROVENTIL;VENTOLIN;PROAIR) 108 (90 Base) MCG/ACT inhaler Inhale 2 puffs into the lungs every 6 hours as needed      ibuprofen (ADVIL;MOTRIN) 600 MG tablet Take 1 tablet by mouth every 6 hours as needed for Pain 40 tablet 1    Multiple Vitamin (MULTIVITAMIN, BARIATRIC FUSION COMPLETE, CHEW TAB) Take 4 tablets by mouth daily       Family History   Problem Relation Age of Onset    Hypertension Mother     Migraines Mother     Cancer Father     Hypertension Father     Elevated Lipids Father     Diabetes Father     Heart Attack Paternal Grandmother     Hypertension Paternal Grandmother     Rheum Arthritis Neg Hx     Osteoarthritis Neg Hx     Asthma Neg Hx     Breast Cancer Neg Hx     Heart Failure Neg Hx     High Cholesterol Neg Hx     Ovarian Cancer Neg Hx     Rashes/Skin Problems Neg Hx     Seizures Neg Hx     Stroke Neg Hx     Thyroid Disease Neg Hx      BP (!) 146/81   Pulse 71   Temp 98.3 °F (36.8 °C) (Oral)   Resp 16   Ht 1.753 m (5' 9\")   Wt 107 kg (235 lb 12.8 oz)   LMP 12/07/2024   SpO2 100%   BMI 34.82 kg/m²       WDWN in NAD  A and O x 3  HEENT-EOMI, mmm  CAR-RRR  Lungs-CTA  ABD-soft, NT, ND, no hsm  EXT-no c/c/e, no cords, NT  Neuro-grossly intact     US NON OB TRANSVAGINAL [[id number]]  Status: Final result     PACS Images     Show images for US NON OB TRANSVAGINAL  Related Results     US PELVIS COMPLETE Final result 7/3/2024          Narrative   EXAMINATION:  PELVIC ULTRASOUND    7/2/2024    TECHNIQUE:  Transabdominal and transvaginal images were performed.  Color Doppler was used    COMPARISON:  March 2024 ...   Impression   3 mm cystic focus projects in the region the endometrial canal of uncertain  significance.  Recommend correlation with serum beta.    Fibroid uterus    Flow is seen in the ovaries           US NON OB TRANSVAGINAL  Order:

## 2024-12-13 ENCOUNTER — TELEPHONE (OUTPATIENT)
Dept: GYNECOLOGY | Age: 34
End: 2024-12-13

## 2024-12-13 NOTE — TELEPHONE ENCOUNTER
Filled out disability work, scanned into system in the media tab  faxed over to [employer] at [phone]

## 2024-12-15 NOTE — OP NOTE
this was not in the way of interrupting that, and I also felt this was most likely not the cause of her pain given her endometriosis.    Posterior cul-de-sac there were 2 areas of endometriosis.  This was cauterized with the LigaSure Blunt tip as well.  There was 1 small area in the right ovarian fossa.  I felt that I could not cauterize the endometriosis in the right ovarian fossa given its close proximity to ureter and vessels.  Irrigation was performed.  Appendix was normal.  Decision was then made to end the procedure.  Where the fibroid was removed was hemostatic.  Decision was made to just backfill the bladder with some saline just to make sure the fibroid was away from the bladder, which it was.  The decision was then made to end the procedure.  All the trocars were removed under direct visualization.  Pneumoperitoneum was released.  Suprapubic fascia was closed with 0 Vicryl in a running fashion.  All the subcuticular tissue was closed with 4-0 Vicryl in a subcuticular fashion.  Steri-Strips and Mastisol were placed.  The tenaculum was removed.  Vaginal sweep was intact.  There was no bleeding.  The Churchill was removed.  The patient was taken to recovery room in stable condition.          REMI HOGAN MD      D:  12/15/2024 02:56:38     T:  12/15/2024 06:10:13     SONIA/DEVORA  Job #:  [id number]     Doc#:  [id number]

## 2024-12-24 ENCOUNTER — OFFICE VISIT (OUTPATIENT)
Dept: GYNECOLOGY | Age: 34
End: 2024-12-24

## 2024-12-24 VITALS
BODY MASS INDEX: 33.86 KG/M2 | WEIGHT: 229.28 LBS | SYSTOLIC BLOOD PRESSURE: 110 MMHG | TEMPERATURE: 97 F | DIASTOLIC BLOOD PRESSURE: 70 MMHG

## 2024-12-24 DIAGNOSIS — K62.89 RECTAL PAIN: Primary | ICD-10-CM

## 2024-12-24 DIAGNOSIS — N80.9 ENDOMETRIOSIS: ICD-10-CM

## 2024-12-24 DIAGNOSIS — D21.9 FIBROIDS: ICD-10-CM

## 2024-12-24 PROCEDURE — 99024 POSTOP FOLLOW-UP VISIT: CPT | Performed by: OBSTETRICS & GYNECOLOGY

## 2024-12-25 RX ORDER — RELUGOLIX, ESTRADIOL HEMIHYDRATE, AND NORETHINDRONE ACETATE 40; 1; .5 MG/1; MG/1; MG/1
1 TABLET, FILM COATED ORAL DAILY
Qty: 90 TABLET | Refills: 3 | Status: SHIPPED | OUTPATIENT
Start: 2024-12-25

## 2024-12-25 RX ORDER — TRAMADOL HYDROCHLORIDE 50 MG/1
50 TABLET ORAL EVERY 6 HOURS PRN
Qty: 30 TABLET | Refills: 0 | Status: SHIPPED | OUTPATIENT
Start: 2024-12-25 | End: 2025-01-08

## 2024-12-26 NOTE — PROGRESS NOTES
Patient is here for post op visit. Patient with newly dx endometriosis. Patient states has rectal pain with bm.     /70 (Site: Left Upper Arm, Position: Sitting)   Temp 97 °F (36.1 °C) (Infrared)   Wt 104 kg (229 lb 4.5 oz)   LMP 12/07/2024   BMI 33.86 kg/m²     WDWN in NAD  A and O x 3  ABD-soft, NT, ND, incision cdi  Ext-no c/c/e, no cords, NT     Plan-s/p d/x ls with fibroid/endometriosis  -pain-feel some could be explained by endometriosis but not sure all is. Recommend myfembree. Also recommend to see Dr. Cole again as well.   Tramadol to use sparingly.   RTC 3 months.

## 2025-01-09 ENCOUNTER — OFFICE VISIT (OUTPATIENT)
Age: 35
End: 2025-01-09

## 2025-01-09 VITALS
SYSTOLIC BLOOD PRESSURE: 136 MMHG | HEART RATE: 98 BPM | OXYGEN SATURATION: 98 % | BODY MASS INDEX: 33.92 KG/M2 | WEIGHT: 229 LBS | DIASTOLIC BLOOD PRESSURE: 76 MMHG | TEMPERATURE: 98.1 F | HEIGHT: 69 IN

## 2025-01-09 DIAGNOSIS — N30.00 ACUTE CYSTITIS WITHOUT HEMATURIA: Primary | ICD-10-CM

## 2025-01-09 DIAGNOSIS — R35.0 URINARY FREQUENCY: ICD-10-CM

## 2025-01-09 LAB
BILIRUBIN, POC: NEGATIVE
BLOOD URINE, POC: NEGATIVE
CLARITY, POC: CLEAR
COLOR, POC: YELLOW
GLUCOSE URINE, POC: NEGATIVE MG/DL
KETONES, POC: NEGATIVE MG/DL
LEUKOCYTE EST, POC: NEGATIVE
NITRITE, POC: NEGATIVE
PH, POC: 7
PROTEIN, POC: NEGATIVE MG/DL
SPECIFIC GRAVITY, POC: 1.03
UROBILINOGEN, POC: 0.2 MG/DL

## 2025-01-09 RX ORDER — NITROFURANTOIN 25; 75 MG/1; MG/1
100 CAPSULE ORAL 2 TIMES DAILY
Qty: 14 CAPSULE | Refills: 0 | Status: SHIPPED | OUTPATIENT
Start: 2025-01-09 | End: 2025-01-16

## 2025-01-09 RX ORDER — PHENAZOPYRIDINE HYDROCHLORIDE 200 MG/1
200 TABLET, FILM COATED ORAL 3 TIMES DAILY PRN
Qty: 9 TABLET | Refills: 0 | Status: SHIPPED | OUTPATIENT
Start: 2025-01-09 | End: 2025-01-12

## 2025-01-09 ASSESSMENT — ENCOUNTER SYMPTOMS
VOMITING: 0
BACK PAIN: 0
NAUSEA: 0
COUGH: 0
ABDOMINAL PAIN: 0
DIARRHEA: 0

## 2025-01-09 NOTE — PATIENT INSTRUCTIONS
New Prescriptions    NITROFURANTOIN, MACROCRYSTAL-MONOHYDRATE, (MACROBID) 100 MG CAPSULE    Take 1 capsule by mouth 2 times daily for 7 days    PHENAZOPYRIDINE (PYRIDIUM) 200 MG TABLET    Take 1 tablet by mouth 3 times daily as needed for Pain      Take the antibiotic as prescribed.  Take the pyridium as needed for pain relief.  Encourage rest and increase fluid intake.  We will call with the culture results.  Follow-up with your PCP as needed.  Return for severe/worsening symptoms.

## 2025-01-09 NOTE — PROGRESS NOTES
tenderness or guarding, no CVA tenderness   Musculoskeletal:         General: Normal range of motion.      Cervical back: Normal range of motion.   Skin:     General: Skin is warm and dry.          An electronic signature was used to authenticate this note.      LISA Babcock - CNP

## 2025-01-11 LAB — BACTERIA UR CULT: NORMAL

## 2025-02-25 DIAGNOSIS — K62.89 RECTAL PAIN: ICD-10-CM

## 2025-02-25 DIAGNOSIS — D21.9 FIBROIDS: ICD-10-CM

## 2025-02-25 DIAGNOSIS — N80.9 ENDOMETRIOSIS: ICD-10-CM

## 2025-02-25 RX ORDER — TRAMADOL HYDROCHLORIDE 50 MG/1
50 TABLET ORAL EVERY 6 HOURS PRN
Qty: 20 TABLET | Refills: 0 | Status: SHIPPED | OUTPATIENT
Start: 2025-02-25 | End: 2025-03-11

## 2025-03-05 ENCOUNTER — OFFICE VISIT (OUTPATIENT)
Dept: SURGERY | Age: 35
End: 2025-03-05
Payer: COMMERCIAL

## 2025-03-05 VITALS — WEIGHT: 222.2 LBS | BODY MASS INDEX: 32.81 KG/M2

## 2025-03-05 DIAGNOSIS — K60.2 ANAL FISSURE: Primary | ICD-10-CM

## 2025-03-05 DIAGNOSIS — K59.4 RECTAL SPASM: ICD-10-CM

## 2025-03-05 PROCEDURE — 99214 OFFICE O/P EST MOD 30 MIN: CPT | Performed by: SURGERY

## 2025-03-05 NOTE — PROGRESS NOTES
[facility] [place] [facility] COLORECTAL SURGERY  [address] [place] [address]  [address]  [place] [address]  Dept: [phone]  Dept Fax: [phone]  Loc: [phone]    Visit Date: 3/5/2025    Lory Cr is a 34 y.o. female who presents today for: Follow-up ( fissure/Patient states still in pain, Patient used the cream and still having pain, )      HPI:       Lory Cr is a 34 y.o. female who is known to me for anal fissure, status post Botox injection over 1 year ago.  She states that Botox never really worked too well and she is still having anal spasm.  Of note, she does have chronic loose stool after duodenal switch operation in the past.  She has tried calcium channel blocker ointment with minimal improvement.  Went to [facility] for second opinion and was diagnosed with levator syndrome.  Tried pelvic floor physical therapy with minimal improvement.  She is overall very frustrated.    Objective:     Physical Exam   Wt 100.8 kg (222 lb 3.2 oz)   BMI 32.81 kg/m²   Constitutional: Appears well-developed and well-nourished. Grooming appropriate. No gross deformities. Body mass index is 32.81 kg/m².    Abdominal/wound: Soft, nontender    Anorectal Exam: Chaperone present in room throughout exam.  Patient placed in the left lateral position.  Buttocks spread.    Digital rectal exam performed with lubricated finger.  Anoscopy performed.    Findings reveal: Anal spasm noted but no evidence of anal fissure, abscess, or hemorrhoids    Labs reviewed: None  Imaging reviewed: None  Review of outside hospital records including clinic  Coordination/discussion of care: Coordination of care with  colorectal    No colonoscopy on file   No cologuard on file  No FIT/FOBT on file   No flexible sigmoidoscopy on file      Assessment/Plan:       A/P:  Established problem(s): Anal spasm, levator syndrome  Additional workup/treatment planned: Referral to  colorectal for

## 2025-04-25 ENCOUNTER — TELEPHONE (OUTPATIENT)
Dept: GYNECOLOGY | Age: 35
End: 2025-04-25

## 2025-04-25 NOTE — TELEPHONE ENCOUNTER
Called and left a detail message in letting patient know that her paper work is ready for her to . Gave her office hours on VM. Also informed her that paper work will be up front for her to  . Scanned into patients chart

## 2025-05-01 ENCOUNTER — OFFICE VISIT (OUTPATIENT)
Dept: GYNECOLOGY | Age: 35
End: 2025-05-01
Payer: COMMERCIAL

## 2025-05-01 VITALS
WEIGHT: 216 LBS | SYSTOLIC BLOOD PRESSURE: 110 MMHG | HEART RATE: 104 BPM | DIASTOLIC BLOOD PRESSURE: 64 MMHG | OXYGEN SATURATION: 98 % | HEIGHT: 69 IN | BODY MASS INDEX: 31.99 KG/M2

## 2025-05-01 DIAGNOSIS — G89.4 CHRONIC PAIN SYNDROME: ICD-10-CM

## 2025-05-01 DIAGNOSIS — N80.9 ENDOMETRIOSIS: Primary | ICD-10-CM

## 2025-05-01 DIAGNOSIS — K62.89 RECTAL PAIN: ICD-10-CM

## 2025-05-01 PROCEDURE — 99214 OFFICE O/P EST MOD 30 MIN: CPT | Performed by: OBSTETRICS & GYNECOLOGY

## 2025-05-01 RX ORDER — ELAGOLIX 200 MG/1
200 TABLET, FILM COATED ORAL 2 TIMES DAILY
Qty: 180 TABLET | Refills: 3 | Status: SHIPPED | OUTPATIENT
Start: 2025-05-01

## 2025-05-01 ASSESSMENT — PATIENT HEALTH QUESTIONNAIRE - PHQ9
SUM OF ALL RESPONSES TO PHQ QUESTIONS 1-9: 0
SUM OF ALL RESPONSES TO PHQ QUESTIONS 1-9: 0
2. FEELING DOWN, DEPRESSED OR HOPELESS: NOT AT ALL
SUM OF ALL RESPONSES TO PHQ QUESTIONS 1-9: 0
SUM OF ALL RESPONSES TO PHQ QUESTIONS 1-9: 0
1. LITTLE INTEREST OR PLEASURE IN DOING THINGS: NOT AT ALL

## 2025-05-07 ASSESSMENT — ENCOUNTER SYMPTOMS
RESPIRATORY NEGATIVE: 1
EYES NEGATIVE: 1
ALLERGIC/IMMUNOLOGIC NEGATIVE: 1
RECTAL PAIN: 1

## 2025-05-08 NOTE — PROGRESS NOTES
[facility] [place] [facility] GYNECOLOGY  [address] [id number]  Dept: [phone]  Dept Fax: [phone]  Loc: [phone]     2025    Lory Cr (:  1990) is a 35 y.o. female, here for evaluation of the following medical concerns:    Patient has continued pain with bm. Patient got some botox around muscles. Patient on myfembree with little cycle. Frustrated with pain        Review of Systems   Constitutional: Negative.    HENT: Negative.     Eyes: Negative.    Respiratory: Negative.     Cardiovascular: Negative.    Gastrointestinal:  Positive for rectal pain.   Endocrine: Negative.    Genitourinary:  Positive for pelvic pain.   Musculoskeletal: Negative.    Skin: Negative.    Allergic/Immunologic: Negative.    Neurological: Negative.    Hematological: Negative.    Psychiatric/Behavioral: Negative.       Date of Birth 1990  Past Medical History:   Diagnosis Date    Asthma     controlled    Generalized anxiety disorder     Migraines     Missed  with fetal demise before 20 completed weeks of gestation     At 6 wks  on 22    Moderate episode of recurrent major depressive disorder (HCC) 2018    Morbid obesity with BMI of 50.0-59.9, adult (HCC)     Unspecified vitamin D deficiency 2011    Vertigo      Past Surgical History:   Procedure Laterality Date    ANUS SURGERY N/A 2024    EXAM UNDER ANESTHESIA, BOTOX INJECTION ANAL FISSURE performed by Samy Cole MD at [facility] OR    DILATION AND CURETTAGE OF UTERUS  2013    DILATION AND CURETTAGE OF UTERUS N/A 2024    DILATATION AND CURETTAGE SUCTION performed by Noreen Rosario MD at [facility] OR    ENDOSCOPY, COLON, DIAGNOSTIC      Pt denies colonoscopy (19)    LAPAROSCOPY N/A 2024    DIAGNOSTIC LAPAROSCOPY, REMOVED FIBROID AND CAUTERIZATON OF ENDOMETRIUM performed by Noreen Rosario MD at [facility] OR    OTHER SURGICAL HISTORY  2023    REDO

## 2025-08-05 ENCOUNTER — OFFICE VISIT (OUTPATIENT)
Dept: GYNECOLOGY | Age: 35
End: 2025-08-05
Payer: COMMERCIAL

## 2025-08-05 VITALS
BODY MASS INDEX: 29.59 KG/M2 | OXYGEN SATURATION: 99 % | DIASTOLIC BLOOD PRESSURE: 84 MMHG | HEART RATE: 81 BPM | WEIGHT: 199.8 LBS | HEIGHT: 69 IN | SYSTOLIC BLOOD PRESSURE: 126 MMHG

## 2025-08-05 DIAGNOSIS — R35.0 URINARY FREQUENCY: Primary | ICD-10-CM

## 2025-08-05 DIAGNOSIS — N80.9 ENDOMETRIOSIS: ICD-10-CM

## 2025-08-05 PROCEDURE — 99213 OFFICE O/P EST LOW 20 MIN: CPT | Performed by: OBSTETRICS & GYNECOLOGY

## 2025-08-05 RX ORDER — ELAGOLIX 200 MG/1
200 TABLET, FILM COATED ORAL 2 TIMES DAILY
Qty: 180 TABLET | Refills: 3 | Status: SHIPPED | OUTPATIENT
Start: 2025-08-05

## 2025-08-07 ENCOUNTER — OFFICE VISIT (OUTPATIENT)
Dept: PRIMARY CARE CLINIC | Age: 35
End: 2025-08-07
Payer: COMMERCIAL

## 2025-08-07 VITALS
DIASTOLIC BLOOD PRESSURE: 60 MMHG | OXYGEN SATURATION: 98 % | TEMPERATURE: 98.1 F | HEIGHT: 69 IN | BODY MASS INDEX: 29.47 KG/M2 | SYSTOLIC BLOOD PRESSURE: 100 MMHG | WEIGHT: 199 LBS | HEART RATE: 87 BPM

## 2025-08-07 DIAGNOSIS — Z23 NEED FOR COVID-19 VACCINE: ICD-10-CM

## 2025-08-07 DIAGNOSIS — R53.83 OTHER FATIGUE: ICD-10-CM

## 2025-08-07 DIAGNOSIS — Z00.00 PE (PHYSICAL EXAM), ANNUAL: Primary | ICD-10-CM

## 2025-08-07 DIAGNOSIS — Z23 NEED FOR DIPHTHERIA-TETANUS-PERTUSSIS (TDAP) VACCINE: ICD-10-CM

## 2025-08-07 DIAGNOSIS — Z23 FLU VACCINE NEED: ICD-10-CM

## 2025-08-07 PROCEDURE — 99213 OFFICE O/P EST LOW 20 MIN: CPT | Performed by: INTERNAL MEDICINE

## 2025-08-07 PROCEDURE — 99395 PREV VISIT EST AGE 18-39: CPT | Performed by: INTERNAL MEDICINE

## 2025-08-07 RX ORDER — MULTIVIT-MIN/IRON FUM/FOLIC AC 7.5 MG-4
1 TABLET ORAL DAILY
Qty: 90 TABLET | Refills: 3 | Status: SHIPPED | OUTPATIENT
Start: 2025-08-07

## 2025-08-07 SDOH — ECONOMIC STABILITY: FOOD INSECURITY: WITHIN THE PAST 12 MONTHS, YOU WORRIED THAT YOUR FOOD WOULD RUN OUT BEFORE YOU GOT MONEY TO BUY MORE.: NEVER TRUE

## 2025-08-07 SDOH — ECONOMIC STABILITY: FOOD INSECURITY: WITHIN THE PAST 12 MONTHS, THE FOOD YOU BOUGHT JUST DIDN'T LAST AND YOU DIDN'T HAVE MONEY TO GET MORE.: NEVER TRUE

## 2025-08-07 ASSESSMENT — ENCOUNTER SYMPTOMS
COUGH: 0
ABDOMINAL PAIN: 0
CONSTIPATION: 0
EYES NEGATIVE: 1
SHORTNESS OF BREATH: 0
DIARRHEA: 1
ABDOMINAL DISTENTION: 0
CHEST TIGHTNESS: 0
RECTAL PAIN: 1
BLOOD IN STOOL: 0
WHEEZING: 0

## 2025-08-07 ASSESSMENT — PATIENT HEALTH QUESTIONNAIRE - PHQ9
1. LITTLE INTEREST OR PLEASURE IN DOING THINGS: SEVERAL DAYS
SUM OF ALL RESPONSES TO PHQ QUESTIONS 1-9: 2
2. FEELING DOWN, DEPRESSED OR HOPELESS: SEVERAL DAYS
SUM OF ALL RESPONSES TO PHQ QUESTIONS 1-9: 2

## 2025-08-11 DIAGNOSIS — Z00.00 PE (PHYSICAL EXAM), ANNUAL: ICD-10-CM

## 2025-08-11 DIAGNOSIS — R53.83 OTHER FATIGUE: ICD-10-CM

## 2025-08-11 LAB
ALBUMIN SERPL-MCNC: 3.8 G/DL (ref 3.4–5)
ALBUMIN/GLOB SERPL: 1.5 {RATIO} (ref 1.1–2.2)
ALP SERPL-CCNC: 94 U/L (ref 40–129)
ALT SERPL-CCNC: 27 U/L (ref 10–40)
ANION GAP SERPL CALCULATED.3IONS-SCNC: 9 MMOL/L (ref 3–16)
AST SERPL-CCNC: 25 U/L (ref 15–37)
BACTERIA URNS QL MICRO: ABNORMAL /HPF
BASOPHILS # BLD: 0.1 K/UL (ref 0–0.2)
BASOPHILS NFR BLD: 0.7 %
BILIRUB SERPL-MCNC: 0.3 MG/DL (ref 0–1)
BILIRUB UR QL STRIP.AUTO: NEGATIVE
BUN SERPL-MCNC: 9 MG/DL (ref 7–20)
CALCIUM SERPL-MCNC: 8.9 MG/DL (ref 8.3–10.6)
CHARACTER UR: ABNORMAL
CHLORIDE SERPL-SCNC: 108 MMOL/L (ref 99–110)
CHOLEST SERPL-MCNC: 81 MG/DL (ref 0–199)
CLARITY UR: ABNORMAL
CO2 SERPL-SCNC: 24 MMOL/L (ref 21–32)
COLOR UR: YELLOW
CREAT SERPL-MCNC: 0.6 MG/DL (ref 0.6–1.1)
CRYSTALS URNS MICRO: ABNORMAL /HPF
DEPRECATED RDW RBC AUTO: 15.8 % (ref 12.4–15.4)
EOSINOPHIL # BLD: 0.3 K/UL (ref 0–0.6)
EOSINOPHIL NFR BLD: 3.8 %
EPI CELLS #/AREA URNS HPF: ABNORMAL /HPF (ref 0–5)
EST. AVERAGE GLUCOSE BLD GHB EST-MCNC: 96.8 MG/DL
GFR SERPLBLD CREATININE-BSD FMLA CKD-EPI: >90 ML/MIN/{1.73_M2}
GLUCOSE SERPL-MCNC: 83 MG/DL (ref 70–99)
GLUCOSE UR STRIP.AUTO-MCNC: NEGATIVE MG/DL
HBA1C MFR BLD: 5 %
HCG UR QL: NEGATIVE
HCT VFR BLD AUTO: 41 % (ref 36–48)
HDLC SERPL-MCNC: 46 MG/DL (ref 40–60)
HGB BLD-MCNC: 13.5 G/DL (ref 12–16)
HGB UR QL STRIP.AUTO: NEGATIVE
HYALINE CASTS #/AREA URNS LPF: ABNORMAL /LPF (ref 0–2)
KETONES UR STRIP.AUTO-MCNC: ABNORMAL MG/DL
LDLC SERPL CALC-MCNC: 25 MG/DL
LEUKOCYTE ESTERASE UR QL STRIP.AUTO: NEGATIVE
LYMPHOCYTES # BLD: 3.8 K/UL (ref 1–5.1)
LYMPHOCYTES NFR BLD: 44.1 %
MCH RBC QN AUTO: 30.1 PG (ref 26–34)
MCHC RBC AUTO-ENTMCNC: 32.8 G/DL (ref 31–36)
MCV RBC AUTO: 91.6 FL (ref 80–100)
MONOCYTES # BLD: 0.7 K/UL (ref 0–1.3)
MONOCYTES NFR BLD: 7.8 %
NEUTROPHILS # BLD: 3.8 K/UL (ref 1.7–7.7)
NEUTROPHILS NFR BLD: 43.6 %
NITRITE UR QL STRIP.AUTO: NEGATIVE
PH UR STRIP.AUTO: 6 [PH] (ref 5–8)
PLATELET # BLD AUTO: 246 K/UL (ref 135–450)
PMV BLD AUTO: 9.7 FL (ref 5–10.5)
POTASSIUM SERPL-SCNC: 3.9 MMOL/L (ref 3.5–5.1)
PROT SERPL-MCNC: 6.3 G/DL (ref 6.4–8.2)
PROT UR STRIP.AUTO-MCNC: NEGATIVE MG/DL
RBC # BLD AUTO: 4.48 M/UL (ref 4–5.2)
RBC #/AREA URNS HPF: ABNORMAL /HPF (ref 0–4)
SODIUM SERPL-SCNC: 141 MMOL/L (ref 136–145)
SP GR UR STRIP.AUTO: 1.03 (ref 1–1.03)
TRIGL SERPL-MCNC: 51 MG/DL (ref 0–150)
TSH SERPL DL<=0.005 MIU/L-ACNC: 1.17 UIU/ML (ref 0.27–4.2)
UA DIPSTICK W REFLEX MICRO PNL UR: YES
URN SPEC COLLECT METH UR: ABNORMAL
UROBILINOGEN UR STRIP-ACNC: 1 E.U./DL
VLDLC SERPL CALC-MCNC: 10 MG/DL
WBC # BLD AUTO: 8.7 K/UL (ref 4–11)
WBC #/AREA URNS HPF: ABNORMAL /HPF (ref 0–5)
YEAST URNS QL MICRO: PRESENT /HPF

## (undated) DEVICE — APPLICATOR MEDICATED 26 CC SOLUTION HI LT ORNG CHLORAPREP

## (undated) DEVICE — BINDER ABD H12IN FOR 62-74IN WAIST UNIV 4 PNL PREM DSGN E

## (undated) DEVICE — SYRINGE MED 10ML SLIP TIP BLNT FILL AND LUERLOCK DISP

## (undated) DEVICE — SEALER ENDOSCP L37CM NANO COAT BLNT TIP LAP DIV

## (undated) DEVICE — ELECTROSURGICAL PENCIL ROCKER SWITCH NON COATED BLADE ELECTRODE 10 FT (3 M) CORD HOLSTER: Brand: MEGADYNE

## (undated) DEVICE — ELECTRODE PT RET AD L9FT HI MOIST COND ADH HYDRGEL CORDED

## (undated) DEVICE — MATERNITY PAD,HEAVY: Brand: CURITY

## (undated) DEVICE — SPONGE,LAP,4"X18",XR,ST,5/PK,40PK/CS: Brand: MEDLINE INDUSTRIES, INC.

## (undated) DEVICE — SYRINGE MED 10ML TRNSLUC BRL PLUNG BLK MRK POLYPR CTRL

## (undated) DEVICE — COVER LT HNDL BLU PLAS

## (undated) DEVICE — DEVICE CLSR 10/12MM XL PRT SYS SUT PASS ST DISP CARTER

## (undated) DEVICE — GOWN,SIRUS,POLYRNF,BRTHSLV,XL,30/CS: Brand: MEDLINE

## (undated) DEVICE — RECTAL: Brand: MEDLINE INDUSTRIES, INC.

## (undated) DEVICE — LAPAROSCOPIC SCISSORS: Brand: EPIX LAPAROSCOPIC SCISSORS

## (undated) DEVICE — VESSEL SEALER EXTEND: Brand: ENDOWRIST

## (undated) DEVICE — GLOVE SURG SZ 65 THK91MIL LTX FREE SYN POLYISOPRENE

## (undated) DEVICE — TROCAR: Brand: KII OPTICAL ACCESS SYSTEM

## (undated) DEVICE — BANDAGE PATCH BANDAID 1.5X1.5IN

## (undated) DEVICE — GYN LAPAROSCOPY: Brand: MEDLINE INDUSTRIES, INC.

## (undated) DEVICE — GOWN,SIRUS,POLYRNF,BRTHSLV,LG,30/CS: Brand: MEDLINE

## (undated) DEVICE — TRANSFER SET 3": Brand: MEDLINE INDUSTRIES, INC.

## (undated) DEVICE — SYRINGE MED 50ML LUERLOCK TIP

## (undated) DEVICE — TROCAR: Brand: KII FIOS FIRST ENTRY

## (undated) DEVICE — GLOVE SURG SZ 75 L12IN THK75MIL DK GRN LTX FREE

## (undated) DEVICE — VISIGI 3D®  CALIBRATION SYSTEM  SIZE 36FR STD W/ BULB: Brand: BOEHRINGER® VISIGI 3D™ SLEEVE GASTRECTOMY CALIBRATION SYSTEM, SIZE 36FR W/BULB

## (undated) DEVICE — TOWEL,STOP FLAG GOLD N-W: Brand: MEDLINE

## (undated) DEVICE — GAUZE FLUFF 1 PLY: Brand: DEROYAL

## (undated) DEVICE — SOLUTION SCRB 4OZ 7.5% POVIDONE IOD ANTIMIC BTL

## (undated) DEVICE — STAPLER 60 RELOAD GREEN: Brand: SUREFORM

## (undated) DEVICE — POSITIONER HD REST FOAM CMFRT TCH

## (undated) DEVICE — CATHETER URETH 16FR RED RUB INTMIT ALL PURP 2 OPPOSED EYELET

## (undated) DEVICE — NEEDLE HYPO 22GA L1.5IN BLK POLYPR HUB S STL REG BVL STR

## (undated) DEVICE — SET INSUF TUBE HEAT ISO CONN DISP

## (undated) DEVICE — SUTURE VCRL SZ 2-0 L27IN ABSRB UD L26MM SH 1/2 CIR J417H

## (undated) DEVICE — SYSTEM COLL W/ TISS TRAP INCLUDE COLL CANSTR LID SET OF

## (undated) DEVICE — TROCAR: Brand: KII SLEEVE

## (undated) DEVICE — ADHESIVE SKIN CLSR 0.7ML TOP DERMBND ADV

## (undated) DEVICE — SUTURE VCRL SZ 0 L54IN ABSRB VLT W/O NDL POLYGLACTIN 910 J616H

## (undated) DEVICE — ANTI-FOG SOLUTION WITH FOAM PAD: Brand: DEVON

## (undated) DEVICE — CANNULA SAMP CO2 AD GRN 7FT CO2 AND 7FT O2 TBNG UNIV CONN

## (undated) DEVICE — SURGICAL SET UP - SURE SET: Brand: MEDLINE INDUSTRIES, INC.

## (undated) DEVICE — PAD,NON-ADHERENT,3X8,STERILE,LF,1/PK: Brand: MEDLINE

## (undated) DEVICE — GARMENT,MEDLINE,DVT,INT,CALF,LG, GEN2: Brand: MEDLINE

## (undated) DEVICE — TISSUE RETRIEVAL SYSTEM: Brand: INZII RETRIEVAL SYSTEM

## (undated) DEVICE — SOLUTION IV 1000ML 0.9% SOD CHL

## (undated) DEVICE — 1LYRTR 16FR10ML100%SIL UMS SNP: Brand: MEDLINE INDUSTRIES, INC.

## (undated) DEVICE — STAPLER 60 RELOAD BLUE: Brand: SUREFORM

## (undated) DEVICE — GLOVE ORANGE PI 7   MSG9070

## (undated) DEVICE — NEEDLE HYPO 25GA L1.5IN BVL ORIENTED ECLIPSE

## (undated) DEVICE — MASTISOL ADHESIVE LIQ 2/3ML

## (undated) DEVICE — TROCAR: Brand: KII SHIELDED BLADED ACCESS SYSTEM

## (undated) DEVICE — SHEET,T,THYROID,STERILE: Brand: MEDLINE

## (undated) DEVICE — PLATE ES AD W 9FT CRD 2

## (undated) DEVICE — GAUZE,SPONGE,2"X2",8PLY,STERILE,LF,2'S: Brand: MEDLINE

## (undated) DEVICE — CANNULA SEAL

## (undated) DEVICE — JEWISH HOSPITAL TURNOVER KIT: Brand: MEDLINE INDUSTRIES, INC.

## (undated) DEVICE — STAPLER 60: Brand: SUREFORM

## (undated) DEVICE — BOWL MED L 32OZ PLAS W/ MOLD GRAD EZ OPN PEEL PCH

## (undated) DEVICE — BLANKET WRM W29.9XL79.1IN UP BODY FORC AIR MISTRAL-AIR

## (undated) DEVICE — NEEDLE INSUF L150MM DIA2MM DISP FOR PNEUMOPERI ENDOPATH

## (undated) DEVICE — LARGE NEEDLE DRIVER: Brand: ENDOWRIST

## (undated) DEVICE — PUMP SUC IRR TBNG L10FT W/ HNDPC ASSEMB STRYKEFLOW 2

## (undated) DEVICE — SURE SET-DOUBLE BASIN-LF: Brand: MEDLINE INDUSTRIES, INC.

## (undated) DEVICE — SUTURE VCRL SZ 4-0 L18IN ABSRB UD L19MM PS-2 3/8 CIR PRIM J496H

## (undated) DEVICE — DRAPE,LAP,CHOLE,W/TROUGHS,STERILE: Brand: MEDLINE

## (undated) DEVICE — GLOVE SURG SZ 7 CRM LTX FREE POLYISOPRENE POLYMER BEAD ANTI

## (undated) DEVICE — GOWN SIRUS NONREIN LG W/TWL: Brand: MEDLINE INDUSTRIES, INC.

## (undated) DEVICE — SOLUTION PREP PAINT POV IOD FOR SKIN MUCOUS MEM

## (undated) DEVICE — PROGRASP FORCEPS: Brand: ENDOWRIST

## (undated) DEVICE — CLEANER,CAUTERY TIP,2X2",STERILE: Brand: MEDLINE

## (undated) DEVICE — SUTURE VICRYL + SZ 0 L27IN ABSRB VLT L26MM UR-6 5/8 CIR VCP603H

## (undated) DEVICE — CADIERE FORCEPS: Brand: ENDOWRIST

## (undated) DEVICE — 40583 XL ADVANCED TRENDELENBURG POSITIONING KIT: Brand: 40583 XL ADVANCED TRENDELENBURG POSITIONING KIT

## (undated) DEVICE — ARM DRAPE

## (undated) DEVICE — CATHETER,URETHRAL,REDRUBBER,STRL,16FR: Brand: MEDLINE

## (undated) DEVICE — MERCY HEALTH WEST TURNOVER: Brand: MEDLINE INDUSTRIES, INC.

## (undated) DEVICE — FORCEPS BX L240CM JAW DIA2.8MM L CAP W/ NDL MIC MESH TOOTH

## (undated) DEVICE — STANDARD D&C: Brand: MEDLINE INDUSTRIES, INC.

## (undated) DEVICE — SOLUTION ANTIFOG VIS SYS CLEARIFY LAPSCP

## (undated) DEVICE — AGENT HEMSTAT W2XL4IN OXIDIZED REGENERATED CELOS ABSRB

## (undated) DEVICE — SYSTEM SMK EVAC LAP TBNG FILTER HSNG BENT STYL PNK SEE CLR

## (undated) DEVICE — INTENDED FOR TISSUE SEPARATION, AND OTHER PROCEDURES THAT REQUIRE A SHARP SURGICAL BLADE TO PUNCTURE OR CUT.: Brand: BARD-PARKER ® STAINLESS STEEL BLADES

## (undated) DEVICE — U.V.A.C. SWIVEL HANDLE W/TUBING, 6': Brand: CONVERTORS

## (undated) DEVICE — BLADELESS OBTURATOR, LONG: Brand: WECK VISTA

## (undated) DEVICE — 60 ML SYRINGE,CATHETER TIP: Brand: MONOJECT

## (undated) DEVICE — [HIGH FLOW INSUFFLATOR,  DO NOT USE IF PACKAGE IS DAMAGED,  KEEP DRY,  KEEP AWAY FROM SUNLIGHT,  PROTECT FROM HEAT AND RADIOACTIVE SOURCES.]: Brand: PNEUMOSURE

## (undated) DEVICE — SUTURE STRATAFIX SPRL PDS + SZ 2 0 L12IN ABSRB VLT SH L26MM SXPP1B416